# Patient Record
Sex: MALE | Race: WHITE | NOT HISPANIC OR LATINO | URBAN - METROPOLITAN AREA
[De-identification: names, ages, dates, MRNs, and addresses within clinical notes are randomized per-mention and may not be internally consistent; named-entity substitution may affect disease eponyms.]

---

## 2017-05-25 ENCOUNTER — OUTPATIENT (OUTPATIENT)
Dept: OUTPATIENT SERVICES | Facility: HOSPITAL | Age: 72
LOS: 1 days | Discharge: HOME | End: 2017-05-25

## 2017-06-22 ENCOUNTER — OUTPATIENT (OUTPATIENT)
Dept: OUTPATIENT SERVICES | Facility: HOSPITAL | Age: 72
LOS: 1 days | Discharge: HOME | End: 2017-06-22

## 2017-06-22 DIAGNOSIS — I10 ESSENTIAL (PRIMARY) HYPERTENSION: ICD-10-CM

## 2017-06-22 DIAGNOSIS — M86.9 OSTEOMYELITIS, UNSPECIFIED: ICD-10-CM

## 2017-06-22 DIAGNOSIS — I48.91 UNSPECIFIED ATRIAL FIBRILLATION: ICD-10-CM

## 2017-06-22 DIAGNOSIS — N40.0 BENIGN PROSTATIC HYPERPLASIA WITHOUT LOWER URINARY TRACT SYMPTOMS: ICD-10-CM

## 2017-06-28 DIAGNOSIS — I48.91 UNSPECIFIED ATRIAL FIBRILLATION: ICD-10-CM

## 2017-06-28 DIAGNOSIS — Z79.01 LONG TERM (CURRENT) USE OF ANTICOAGULANTS: ICD-10-CM

## 2017-07-20 ENCOUNTER — OUTPATIENT (OUTPATIENT)
Dept: OUTPATIENT SERVICES | Facility: HOSPITAL | Age: 72
LOS: 1 days | Discharge: HOME | End: 2017-07-20

## 2017-07-20 DIAGNOSIS — Z79.01 LONG TERM (CURRENT) USE OF ANTICOAGULANTS: ICD-10-CM

## 2017-07-20 DIAGNOSIS — I48.91 UNSPECIFIED ATRIAL FIBRILLATION: ICD-10-CM

## 2017-07-20 DIAGNOSIS — N40.0 BENIGN PROSTATIC HYPERPLASIA WITHOUT LOWER URINARY TRACT SYMPTOMS: ICD-10-CM

## 2017-07-20 DIAGNOSIS — I10 ESSENTIAL (PRIMARY) HYPERTENSION: ICD-10-CM

## 2017-07-20 DIAGNOSIS — M86.9 OSTEOMYELITIS, UNSPECIFIED: ICD-10-CM

## 2017-08-24 ENCOUNTER — OUTPATIENT (OUTPATIENT)
Dept: OUTPATIENT SERVICES | Facility: HOSPITAL | Age: 72
LOS: 1 days | Discharge: HOME | End: 2017-08-24

## 2017-08-24 DIAGNOSIS — Z79.01 LONG TERM (CURRENT) USE OF ANTICOAGULANTS: ICD-10-CM

## 2017-08-24 DIAGNOSIS — N40.0 BENIGN PROSTATIC HYPERPLASIA WITHOUT LOWER URINARY TRACT SYMPTOMS: ICD-10-CM

## 2017-08-24 DIAGNOSIS — I10 ESSENTIAL (PRIMARY) HYPERTENSION: ICD-10-CM

## 2017-08-24 DIAGNOSIS — M86.9 OSTEOMYELITIS, UNSPECIFIED: ICD-10-CM

## 2017-08-24 DIAGNOSIS — I48.91 UNSPECIFIED ATRIAL FIBRILLATION: ICD-10-CM

## 2017-09-28 ENCOUNTER — OUTPATIENT (OUTPATIENT)
Dept: OUTPATIENT SERVICES | Facility: HOSPITAL | Age: 72
LOS: 1 days | Discharge: HOME | End: 2017-09-28

## 2017-09-28 DIAGNOSIS — N40.0 BENIGN PROSTATIC HYPERPLASIA WITHOUT LOWER URINARY TRACT SYMPTOMS: ICD-10-CM

## 2017-09-28 DIAGNOSIS — I48.91 UNSPECIFIED ATRIAL FIBRILLATION: ICD-10-CM

## 2017-09-28 DIAGNOSIS — I10 ESSENTIAL (PRIMARY) HYPERTENSION: ICD-10-CM

## 2017-09-28 DIAGNOSIS — Z79.01 LONG TERM (CURRENT) USE OF ANTICOAGULANTS: ICD-10-CM

## 2017-09-28 DIAGNOSIS — M86.9 OSTEOMYELITIS, UNSPECIFIED: ICD-10-CM

## 2017-11-02 ENCOUNTER — OUTPATIENT (OUTPATIENT)
Dept: OUTPATIENT SERVICES | Facility: HOSPITAL | Age: 72
LOS: 1 days | Discharge: HOME | End: 2017-11-02

## 2017-11-02 DIAGNOSIS — I48.91 UNSPECIFIED ATRIAL FIBRILLATION: ICD-10-CM

## 2017-11-02 DIAGNOSIS — M86.9 OSTEOMYELITIS, UNSPECIFIED: ICD-10-CM

## 2017-11-02 DIAGNOSIS — Z79.01 LONG TERM (CURRENT) USE OF ANTICOAGULANTS: ICD-10-CM

## 2017-11-02 DIAGNOSIS — I10 ESSENTIAL (PRIMARY) HYPERTENSION: ICD-10-CM

## 2017-11-02 DIAGNOSIS — N40.0 BENIGN PROSTATIC HYPERPLASIA WITHOUT LOWER URINARY TRACT SYMPTOMS: ICD-10-CM

## 2017-12-06 ENCOUNTER — OUTPATIENT (OUTPATIENT)
Dept: OUTPATIENT SERVICES | Facility: HOSPITAL | Age: 72
LOS: 1 days | Discharge: HOME | End: 2017-12-06

## 2017-12-06 DIAGNOSIS — Z79.01 LONG TERM (CURRENT) USE OF ANTICOAGULANTS: ICD-10-CM

## 2017-12-06 DIAGNOSIS — I48.91 UNSPECIFIED ATRIAL FIBRILLATION: ICD-10-CM

## 2017-12-06 DIAGNOSIS — N40.0 BENIGN PROSTATIC HYPERPLASIA WITHOUT LOWER URINARY TRACT SYMPTOMS: ICD-10-CM

## 2017-12-06 DIAGNOSIS — I10 ESSENTIAL (PRIMARY) HYPERTENSION: ICD-10-CM

## 2017-12-06 DIAGNOSIS — M86.9 OSTEOMYELITIS, UNSPECIFIED: ICD-10-CM

## 2018-01-11 ENCOUNTER — OUTPATIENT (OUTPATIENT)
Dept: OUTPATIENT SERVICES | Facility: HOSPITAL | Age: 73
LOS: 1 days | Discharge: HOME | End: 2018-01-11

## 2018-01-11 DIAGNOSIS — I48.91 UNSPECIFIED ATRIAL FIBRILLATION: ICD-10-CM

## 2018-01-11 DIAGNOSIS — I10 ESSENTIAL (PRIMARY) HYPERTENSION: ICD-10-CM

## 2018-01-11 DIAGNOSIS — N40.0 BENIGN PROSTATIC HYPERPLASIA WITHOUT LOWER URINARY TRACT SYMPTOMS: ICD-10-CM

## 2018-01-11 DIAGNOSIS — Z79.01 LONG TERM (CURRENT) USE OF ANTICOAGULANTS: ICD-10-CM

## 2018-01-11 DIAGNOSIS — M86.9 OSTEOMYELITIS, UNSPECIFIED: ICD-10-CM

## 2018-02-15 ENCOUNTER — OUTPATIENT (OUTPATIENT)
Dept: OUTPATIENT SERVICES | Facility: HOSPITAL | Age: 73
LOS: 1 days | Discharge: HOME | End: 2018-02-15

## 2018-02-15 DIAGNOSIS — I48.91 UNSPECIFIED ATRIAL FIBRILLATION: ICD-10-CM

## 2018-02-15 DIAGNOSIS — Z79.01 LONG TERM (CURRENT) USE OF ANTICOAGULANTS: ICD-10-CM

## 2018-03-22 ENCOUNTER — OUTPATIENT (OUTPATIENT)
Dept: OUTPATIENT SERVICES | Facility: HOSPITAL | Age: 73
LOS: 1 days | Discharge: HOME | End: 2018-03-22

## 2018-03-22 DIAGNOSIS — Z79.01 LONG TERM (CURRENT) USE OF ANTICOAGULANTS: ICD-10-CM

## 2018-03-22 DIAGNOSIS — I48.91 UNSPECIFIED ATRIAL FIBRILLATION: ICD-10-CM

## 2018-04-26 ENCOUNTER — OUTPATIENT (OUTPATIENT)
Dept: OUTPATIENT SERVICES | Facility: HOSPITAL | Age: 73
LOS: 1 days | Discharge: HOME | End: 2018-04-26

## 2018-04-26 DIAGNOSIS — Z79.01 LONG TERM (CURRENT) USE OF ANTICOAGULANTS: ICD-10-CM

## 2018-04-26 DIAGNOSIS — I48.91 UNSPECIFIED ATRIAL FIBRILLATION: ICD-10-CM

## 2018-05-10 ENCOUNTER — OUTPATIENT (OUTPATIENT)
Dept: OUTPATIENT SERVICES | Facility: HOSPITAL | Age: 73
LOS: 1 days | Discharge: HOME | End: 2018-05-10

## 2018-05-10 DIAGNOSIS — Z79.01 LONG TERM (CURRENT) USE OF ANTICOAGULANTS: ICD-10-CM

## 2018-05-10 DIAGNOSIS — I48.91 UNSPECIFIED ATRIAL FIBRILLATION: ICD-10-CM

## 2018-06-14 ENCOUNTER — OUTPATIENT (OUTPATIENT)
Dept: OUTPATIENT SERVICES | Facility: HOSPITAL | Age: 73
LOS: 1 days | Discharge: HOME | End: 2018-06-14

## 2018-06-14 DIAGNOSIS — Z79.01 LONG TERM (CURRENT) USE OF ANTICOAGULANTS: ICD-10-CM

## 2018-06-14 DIAGNOSIS — I48.91 UNSPECIFIED ATRIAL FIBRILLATION: ICD-10-CM

## 2018-07-12 ENCOUNTER — OUTPATIENT (OUTPATIENT)
Dept: OUTPATIENT SERVICES | Facility: HOSPITAL | Age: 73
LOS: 1 days | Discharge: HOME | End: 2018-07-12

## 2018-07-12 DIAGNOSIS — I48.91 UNSPECIFIED ATRIAL FIBRILLATION: ICD-10-CM

## 2018-07-12 DIAGNOSIS — Z79.01 LONG TERM (CURRENT) USE OF ANTICOAGULANTS: ICD-10-CM

## 2018-08-09 ENCOUNTER — OUTPATIENT (OUTPATIENT)
Dept: OUTPATIENT SERVICES | Facility: HOSPITAL | Age: 73
LOS: 1 days | Discharge: HOME | End: 2018-08-09

## 2018-08-09 DIAGNOSIS — Z79.01 LONG TERM (CURRENT) USE OF ANTICOAGULANTS: ICD-10-CM

## 2018-08-09 DIAGNOSIS — I48.91 UNSPECIFIED ATRIAL FIBRILLATION: ICD-10-CM

## 2018-09-13 ENCOUNTER — OUTPATIENT (OUTPATIENT)
Dept: OUTPATIENT SERVICES | Facility: HOSPITAL | Age: 73
LOS: 1 days | Discharge: HOME | End: 2018-09-13

## 2018-09-13 DIAGNOSIS — I48.91 UNSPECIFIED ATRIAL FIBRILLATION: ICD-10-CM

## 2018-09-13 DIAGNOSIS — Z79.01 LONG TERM (CURRENT) USE OF ANTICOAGULANTS: ICD-10-CM

## 2018-09-13 LAB
POCT INR: 2 RATIO — HIGH (ref 0.9–1.2)
POCT PT: 23.7 SEC — HIGH (ref 10–13.4)

## 2018-10-18 ENCOUNTER — OUTPATIENT (OUTPATIENT)
Dept: OUTPATIENT SERVICES | Facility: HOSPITAL | Age: 73
LOS: 1 days | Discharge: HOME | End: 2018-10-18

## 2018-10-18 DIAGNOSIS — I48.91 UNSPECIFIED ATRIAL FIBRILLATION: ICD-10-CM

## 2018-10-18 DIAGNOSIS — Z79.01 LONG TERM (CURRENT) USE OF ANTICOAGULANTS: ICD-10-CM

## 2018-10-18 LAB
POCT INR: 2.3 RATIO — HIGH (ref 0.9–1.2)
POCT PT: 27 SEC — HIGH (ref 10–13.4)

## 2018-11-29 ENCOUNTER — OUTPATIENT (OUTPATIENT)
Dept: OUTPATIENT SERVICES | Facility: HOSPITAL | Age: 73
LOS: 1 days | Discharge: HOME | End: 2018-11-29

## 2018-11-29 DIAGNOSIS — Z79.01 LONG TERM (CURRENT) USE OF ANTICOAGULANTS: ICD-10-CM

## 2018-11-29 DIAGNOSIS — I48.91 UNSPECIFIED ATRIAL FIBRILLATION: ICD-10-CM

## 2018-11-29 LAB
POCT INR: 2.5 RATIO — HIGH (ref 0.9–1.2)
POCT PT: 30 SEC — HIGH (ref 10–13.4)

## 2019-01-03 ENCOUNTER — OUTPATIENT (OUTPATIENT)
Dept: OUTPATIENT SERVICES | Facility: HOSPITAL | Age: 74
LOS: 1 days | Discharge: HOME | End: 2019-01-03

## 2019-01-03 DIAGNOSIS — Z79.01 LONG TERM (CURRENT) USE OF ANTICOAGULANTS: ICD-10-CM

## 2019-01-03 DIAGNOSIS — I48.91 UNSPECIFIED ATRIAL FIBRILLATION: ICD-10-CM

## 2019-01-03 LAB
POCT INR: 2.7 RATIO — HIGH (ref 0.9–1.2)
POCT PT: 32.2 SEC — HIGH (ref 10–13.4)

## 2019-02-07 ENCOUNTER — OUTPATIENT (OUTPATIENT)
Dept: OUTPATIENT SERVICES | Facility: HOSPITAL | Age: 74
LOS: 1 days | Discharge: HOME | End: 2019-02-07

## 2019-02-07 DIAGNOSIS — I48.91 UNSPECIFIED ATRIAL FIBRILLATION: ICD-10-CM

## 2019-02-07 DIAGNOSIS — Z79.01 LONG TERM (CURRENT) USE OF ANTICOAGULANTS: ICD-10-CM

## 2019-02-07 LAB
POCT INR: 2.5 RATIO — HIGH (ref 0.9–1.2)
POCT PT: 30.5 SEC — HIGH (ref 10–13.4)

## 2019-03-14 ENCOUNTER — OUTPATIENT (OUTPATIENT)
Dept: OUTPATIENT SERVICES | Facility: HOSPITAL | Age: 74
LOS: 1 days | Discharge: HOME | End: 2019-03-14

## 2019-03-14 DIAGNOSIS — Z79.01 LONG TERM (CURRENT) USE OF ANTICOAGULANTS: ICD-10-CM

## 2019-03-14 DIAGNOSIS — I48.91 UNSPECIFIED ATRIAL FIBRILLATION: ICD-10-CM

## 2019-03-14 DIAGNOSIS — Z95.2 PRESENCE OF PROSTHETIC HEART VALVE: ICD-10-CM

## 2019-03-14 LAB
POCT INR: 2.8 RATIO — HIGH (ref 0.9–1.2)
POCT PT: 33.9 SEC — HIGH (ref 10–13.4)

## 2019-04-18 ENCOUNTER — OUTPATIENT (OUTPATIENT)
Dept: OUTPATIENT SERVICES | Facility: HOSPITAL | Age: 74
LOS: 1 days | Discharge: HOME | End: 2019-04-18

## 2019-04-18 DIAGNOSIS — Z79.01 LONG TERM (CURRENT) USE OF ANTICOAGULANTS: ICD-10-CM

## 2019-04-18 DIAGNOSIS — Z95.2 PRESENCE OF PROSTHETIC HEART VALVE: ICD-10-CM

## 2019-04-18 LAB
POCT INR: 2.9 RATIO — HIGH (ref 0.9–1.2)
POCT PT: 35.2 SEC — HIGH (ref 10–13.4)

## 2019-05-23 ENCOUNTER — OUTPATIENT (OUTPATIENT)
Dept: OUTPATIENT SERVICES | Facility: HOSPITAL | Age: 74
LOS: 1 days | Discharge: HOME | End: 2019-05-23

## 2019-05-23 DIAGNOSIS — Z79.01 LONG TERM (CURRENT) USE OF ANTICOAGULANTS: ICD-10-CM

## 2019-05-23 DIAGNOSIS — Z95.2 PRESENCE OF PROSTHETIC HEART VALVE: ICD-10-CM

## 2019-06-27 ENCOUNTER — OUTPATIENT (OUTPATIENT)
Dept: OUTPATIENT SERVICES | Facility: HOSPITAL | Age: 74
LOS: 1 days | Discharge: HOME | End: 2019-06-27

## 2019-06-27 DIAGNOSIS — Z79.01 LONG TERM (CURRENT) USE OF ANTICOAGULANTS: ICD-10-CM

## 2019-06-27 DIAGNOSIS — Z95.2 PRESENCE OF PROSTHETIC HEART VALVE: ICD-10-CM

## 2019-06-27 LAB
POCT INR: 2.7 RATIO — HIGH (ref 0.9–1.2)
POCT PT: 32.1 SEC — HIGH (ref 10–13.4)

## 2019-08-01 ENCOUNTER — OUTPATIENT (OUTPATIENT)
Dept: OUTPATIENT SERVICES | Facility: HOSPITAL | Age: 74
LOS: 1 days | Discharge: HOME | End: 2019-08-01

## 2019-08-01 DIAGNOSIS — Z95.2 PRESENCE OF PROSTHETIC HEART VALVE: ICD-10-CM

## 2019-08-01 DIAGNOSIS — Z79.01 LONG TERM (CURRENT) USE OF ANTICOAGULANTS: ICD-10-CM

## 2019-09-05 ENCOUNTER — OUTPATIENT (OUTPATIENT)
Dept: OUTPATIENT SERVICES | Facility: HOSPITAL | Age: 74
LOS: 1 days | Discharge: HOME | End: 2019-09-05

## 2019-09-05 DIAGNOSIS — Z95.2 PRESENCE OF PROSTHETIC HEART VALVE: ICD-10-CM

## 2019-09-05 DIAGNOSIS — Z79.01 LONG TERM (CURRENT) USE OF ANTICOAGULANTS: ICD-10-CM

## 2019-09-05 LAB
POCT INR: 2.3 RATIO — HIGH (ref 0.9–1.2)
POCT PT: 27.7 SEC — HIGH (ref 10–13.4)

## 2019-10-10 ENCOUNTER — OUTPATIENT (OUTPATIENT)
Dept: OUTPATIENT SERVICES | Facility: HOSPITAL | Age: 74
LOS: 1 days | Discharge: HOME | End: 2019-10-10

## 2019-10-10 DIAGNOSIS — Z79.01 LONG TERM (CURRENT) USE OF ANTICOAGULANTS: ICD-10-CM

## 2019-10-10 DIAGNOSIS — Z95.2 PRESENCE OF PROSTHETIC HEART VALVE: ICD-10-CM

## 2019-10-10 LAB
POCT INR: 3 RATIO — HIGH (ref 0.9–1.2)
POCT PT: 36.2 SEC — HIGH (ref 10–13.4)

## 2019-11-14 ENCOUNTER — OUTPATIENT (OUTPATIENT)
Dept: OUTPATIENT SERVICES | Facility: HOSPITAL | Age: 74
LOS: 1 days | Discharge: HOME | End: 2019-11-14

## 2019-11-14 DIAGNOSIS — Z95.2 PRESENCE OF PROSTHETIC HEART VALVE: ICD-10-CM

## 2019-11-14 DIAGNOSIS — Z79.01 LONG TERM (CURRENT) USE OF ANTICOAGULANTS: ICD-10-CM

## 2019-11-14 LAB
POCT INR: 2.5 RATIO — HIGH (ref 0.9–1.2)
POCT PT: 30.4 SEC — HIGH (ref 10–13.4)

## 2019-12-19 ENCOUNTER — OUTPATIENT (OUTPATIENT)
Dept: OUTPATIENT SERVICES | Facility: HOSPITAL | Age: 74
LOS: 1 days | Discharge: HOME | End: 2019-12-19

## 2019-12-19 DIAGNOSIS — Z95.2 PRESENCE OF PROSTHETIC HEART VALVE: ICD-10-CM

## 2019-12-19 DIAGNOSIS — Z79.01 LONG TERM (CURRENT) USE OF ANTICOAGULANTS: ICD-10-CM

## 2019-12-19 LAB
POCT INR: 2.4 RATIO — HIGH (ref 0.9–1.2)
POCT PT: 28.3 SEC — HIGH (ref 10–13.4)

## 2020-01-01 ENCOUNTER — OUTPATIENT (OUTPATIENT)
Dept: OUTPATIENT SERVICES | Facility: HOSPITAL | Age: 75
LOS: 1 days | Discharge: HOME | End: 2020-01-01

## 2020-01-01 ENCOUNTER — APPOINTMENT (OUTPATIENT)
Dept: MEDICATION MANAGEMENT | Facility: CLINIC | Age: 75
End: 2020-01-01

## 2020-01-01 ENCOUNTER — RECORD ABSTRACTING (OUTPATIENT)
Age: 75
End: 2020-01-01

## 2020-01-01 VITALS — HEART RATE: 76 BPM | RESPIRATION RATE: 16 BRPM | OXYGEN SATURATION: 98 %

## 2020-01-01 VITALS — RESPIRATION RATE: 16 BRPM | HEART RATE: 74 BPM | OXYGEN SATURATION: 98 %

## 2020-01-01 VITALS — HEART RATE: 97 BPM | RESPIRATION RATE: 18 BRPM | OXYGEN SATURATION: 98 %

## 2020-01-01 VITALS — RESPIRATION RATE: 18 BRPM | OXYGEN SATURATION: 98 % | HEART RATE: 97 BPM

## 2020-01-01 DIAGNOSIS — I50.9 HEART FAILURE, UNSPECIFIED: ICD-10-CM

## 2020-01-01 DIAGNOSIS — I48.91 UNSPECIFIED ATRIAL FIBRILLATION: ICD-10-CM

## 2020-01-01 DIAGNOSIS — Z79.01 LONG TERM (CURRENT) USE OF ANTICOAGULANTS: ICD-10-CM

## 2020-01-01 DIAGNOSIS — Z86.79 PERSONAL HISTORY OF OTHER DISEASES OF THE CIRCULATORY SYSTEM: ICD-10-CM

## 2020-01-01 DIAGNOSIS — Z78.9 OTHER SPECIFIED HEALTH STATUS: ICD-10-CM

## 2020-01-01 DIAGNOSIS — E11.65 TYPE 2 DIABETES MELLITUS WITH HYPERGLYCEMIA: ICD-10-CM

## 2020-01-01 DIAGNOSIS — Z51.81 ENCOUNTER FOR THERAPEUTIC DRUG LVL MONITORING: ICD-10-CM

## 2020-01-01 DIAGNOSIS — E78.00 PURE HYPERCHOLESTEROLEMIA, UNSPECIFIED: ICD-10-CM

## 2020-01-01 DIAGNOSIS — Z98.1 ARTHRODESIS STATUS: ICD-10-CM

## 2020-01-01 DIAGNOSIS — I10 ESSENTIAL (PRIMARY) HYPERTENSION: ICD-10-CM

## 2020-01-01 LAB
INR PPP: 2.5 RATIO
INR PPP: 2.8 RATIO
INR PPP: 3 RATIO
INR PPP: 3.3 RATIO
POCT INR: 2.3 RATIO — HIGH (ref 0.9–1.2)
POCT INR: 2.6 RATIO — HIGH (ref 0.9–1.2)
POCT INR: 2.7 RATIO — HIGH (ref 0.9–1.2)
POCT INR: 3.5 RATIO — HIGH (ref 0.9–1.2)
POCT PT: 27.9 SEC — HIGH (ref 10–13.4)
POCT PT: 31.7 SEC — HIGH (ref 10–13.4)
POCT PT: 32.6 SEC — HIGH (ref 10–13.4)
POCT PT: 41.7 SEC — HIGH (ref 10–13.4)
POCT-PROTHROMBIN TIME: 29.5 SECS
POCT-PROTHROMBIN TIME: 33.8 SECS
POCT-PROTHROMBIN TIME: 36.5 SECS
POCT-PROTHROMBIN TIME: 39.2 SECS
QUALITY CONTROL: YES

## 2020-01-01 RX ORDER — FUROSEMIDE 20 MG/1
20 TABLET ORAL DAILY
Refills: 0 | Status: ACTIVE | COMMUNITY

## 2020-01-01 RX ORDER — OMEPRAZOLE 20 MG/1
20 TABLET, DELAYED RELEASE ORAL
Refills: 0 | Status: ACTIVE | COMMUNITY

## 2020-01-01 RX ORDER — LOSARTAN POTASSIUM 50 MG/1
50 TABLET, FILM COATED ORAL
Refills: 0 | Status: ACTIVE | COMMUNITY

## 2020-01-01 RX ORDER — PNV NO.95/FERROUS FUM/FOLIC AC 28MG-0.8MG
TABLET ORAL
Refills: 0 | Status: ACTIVE | COMMUNITY

## 2020-01-01 RX ORDER — CALCIUM CARBONATE/VITAMIN D3 600 MG-10
TABLET ORAL
Refills: 0 | Status: ACTIVE | COMMUNITY

## 2020-01-01 RX ORDER — DILTIAZEM HYDROCHLORIDE 240 MG/1
240 CAPSULE, EXTENDED RELEASE ORAL
Refills: 0 | Status: ACTIVE | COMMUNITY

## 2020-01-01 RX ORDER — MULTIVITAMIN
TABLET ORAL
Refills: 0 | Status: ACTIVE | COMMUNITY

## 2020-01-01 RX ORDER — ATENOLOL 50 MG/1
50 TABLET ORAL DAILY
Refills: 0 | Status: ACTIVE | COMMUNITY

## 2020-01-01 RX ORDER — TAMSULOSIN HYDROCHLORIDE 0.4 MG/1
0.4 CAPSULE ORAL
Refills: 0 | Status: ACTIVE | COMMUNITY

## 2020-01-01 RX ORDER — METFORMIN HYDROCHLORIDE 500 MG/1
500 TABLET, COATED ORAL
Refills: 0 | Status: ACTIVE | COMMUNITY

## 2020-01-01 RX ORDER — WARFARIN SODIUM 3 MG/1
3 TABLET ORAL
Refills: 0 | Status: ACTIVE | COMMUNITY

## 2020-01-01 RX ORDER — WARFARIN 3 MG/1
3 TABLET ORAL
Refills: 0 | Status: ACTIVE | COMMUNITY

## 2020-01-01 RX ORDER — DIGOXIN 0.06 MG/1
TABLET ORAL
Refills: 0 | Status: ACTIVE | COMMUNITY

## 2020-01-01 RX ORDER — SIMVASTATIN 20 MG/1
20 TABLET, FILM COATED ORAL DAILY
Refills: 0 | Status: ACTIVE | COMMUNITY

## 2020-01-23 ENCOUNTER — OUTPATIENT (OUTPATIENT)
Dept: OUTPATIENT SERVICES | Facility: HOSPITAL | Age: 75
LOS: 1 days | Discharge: HOME | End: 2020-01-23

## 2020-01-23 DIAGNOSIS — I48.91 UNSPECIFIED ATRIAL FIBRILLATION: ICD-10-CM

## 2020-01-23 DIAGNOSIS — Z79.01 LONG TERM (CURRENT) USE OF ANTICOAGULANTS: ICD-10-CM

## 2020-01-23 LAB
POCT INR: 2.9 RATIO — HIGH (ref 0.9–1.2)
POCT PT: 35.4 SEC — HIGH (ref 10–13.4)

## 2020-02-27 ENCOUNTER — OUTPATIENT (OUTPATIENT)
Dept: OUTPATIENT SERVICES | Facility: HOSPITAL | Age: 75
LOS: 1 days | Discharge: HOME | End: 2020-02-27

## 2020-02-27 DIAGNOSIS — I48.91 UNSPECIFIED ATRIAL FIBRILLATION: ICD-10-CM

## 2020-02-27 DIAGNOSIS — Z79.01 LONG TERM (CURRENT) USE OF ANTICOAGULANTS: ICD-10-CM

## 2020-02-27 LAB
POCT INR: 2.9 RATIO — HIGH (ref 0.9–1.2)
POCT PT: 34.5 SEC — HIGH (ref 10–13.4)

## 2020-08-07 PROBLEM — E78.00 HYPERCHOLESTEROLEMIA: Status: ACTIVE | Noted: 2020-01-01

## 2020-08-07 PROBLEM — I50.9 CHF (CONGESTIVE HEART FAILURE): Status: ACTIVE | Noted: 2020-01-01

## 2020-08-07 PROBLEM — Z78.9 NON-SMOKER: Status: ACTIVE | Noted: 2020-01-01

## 2020-08-07 PROBLEM — Z00.00 ENCOUNTER FOR PREVENTIVE HEALTH EXAMINATION: Status: ACTIVE | Noted: 2020-01-01

## 2020-08-07 PROBLEM — E11.65 TYPE 2 DIABETES MELLITUS WITH HYPERGLYCEMIA: Status: ACTIVE | Noted: 2020-01-01

## 2020-08-13 PROBLEM — I48.91 UNSPECIFIED ATRIAL FIBRILLATION: Status: ACTIVE | Noted: 2020-01-01

## 2020-08-13 PROBLEM — Z51.81 ENCOUNTER FOR THERAPEUTIC DRUG MONITORING: Status: ACTIVE | Noted: 2020-01-01

## 2020-09-18 PROBLEM — Z98.1 H/O SPINAL FUSION: Status: RESOLVED | Noted: 2020-01-01 | Resolved: 2020-01-01

## 2020-09-18 PROBLEM — I10 HYPERTENSION, BENIGN: Status: RESOLVED | Noted: 2020-01-01 | Resolved: 2020-01-01

## 2020-09-29 PROBLEM — Z86.79 HISTORY OF ATRIAL FIBRILLATION: Status: RESOLVED | Noted: 2020-01-01 | Resolved: 2020-01-01

## 2020-11-05 PROBLEM — Z79.01 LONG TERM (CURRENT) USE OF ANTICOAGULANTS: Status: ACTIVE | Noted: 2020-01-01

## 2021-01-01 ENCOUNTER — INPATIENT (INPATIENT)
Facility: HOSPITAL | Age: 76
LOS: 19 days | End: 2021-03-04
Attending: INTERNAL MEDICINE | Admitting: INTERNAL MEDICINE
Payer: MEDICARE

## 2021-01-01 ENCOUNTER — INPATIENT (INPATIENT)
Facility: HOSPITAL | Age: 76
LOS: 16 days | End: 2021-03-04
Attending: INTERNAL MEDICINE | Admitting: INTERNAL MEDICINE
Payer: MEDICARE

## 2021-01-01 ENCOUNTER — APPOINTMENT (OUTPATIENT)
Dept: MEDICATION MANAGEMENT | Facility: CLINIC | Age: 76
End: 2021-01-01

## 2021-01-01 ENCOUNTER — OUTPATIENT (OUTPATIENT)
Dept: OUTPATIENT SERVICES | Facility: HOSPITAL | Age: 76
LOS: 1 days | Discharge: HOME | End: 2021-01-01

## 2021-01-01 VITALS — WEIGHT: 220.02 LBS | HEIGHT: 70 IN

## 2021-01-01 VITALS
SYSTOLIC BLOOD PRESSURE: 95 MMHG | DIASTOLIC BLOOD PRESSURE: 56 MMHG | RESPIRATION RATE: 24 BRPM | HEART RATE: 102 BPM | TEMPERATURE: 98 F | OXYGEN SATURATION: 95 %

## 2021-01-01 VITALS
SYSTOLIC BLOOD PRESSURE: 113 MMHG | HEART RATE: 92 BPM | DIASTOLIC BLOOD PRESSURE: 69 MMHG | OXYGEN SATURATION: 88 % | RESPIRATION RATE: 22 BRPM

## 2021-01-01 VITALS — HEART RATE: 69 BPM | RESPIRATION RATE: 21 BRPM

## 2021-01-01 DIAGNOSIS — E87.8 OTHER DISORDERS OF ELECTROLYTE AND FLUID BALANCE, NOT ELSEWHERE CLASSIFIED: ICD-10-CM

## 2021-01-01 DIAGNOSIS — U07.1 COVID-19: ICD-10-CM

## 2021-01-01 DIAGNOSIS — I82.462 ACUTE EMBOLISM AND THROMBOSIS OF LEFT CALF MUSCULAR VEIN: ICD-10-CM

## 2021-01-01 DIAGNOSIS — Z79.01 LONG TERM (CURRENT) USE OF ANTICOAGULANTS: ICD-10-CM

## 2021-01-01 DIAGNOSIS — I26.99 OTHER PULMONARY EMBOLISM WITHOUT ACUTE COR PULMONALE: ICD-10-CM

## 2021-01-01 DIAGNOSIS — J12.82 PNEUMONIA DUE TO CORONAVIRUS DISEASE 2019: ICD-10-CM

## 2021-01-01 DIAGNOSIS — I10 ESSENTIAL (PRIMARY) HYPERTENSION: ICD-10-CM

## 2021-01-01 DIAGNOSIS — E78.2 MIXED HYPERLIPIDEMIA: ICD-10-CM

## 2021-01-01 DIAGNOSIS — R16.0 HEPATOMEGALY, NOT ELSEWHERE CLASSIFIED: ICD-10-CM

## 2021-01-01 DIAGNOSIS — J80 ACUTE RESPIRATORY DISTRESS SYNDROME: ICD-10-CM

## 2021-01-01 DIAGNOSIS — Z98.890 OTHER SPECIFIED POSTPROCEDURAL STATES: Chronic | ICD-10-CM

## 2021-01-01 DIAGNOSIS — E11.65 TYPE 2 DIABETES MELLITUS WITH HYPERGLYCEMIA: ICD-10-CM

## 2021-01-01 DIAGNOSIS — G47.33 OBSTRUCTIVE SLEEP APNEA (ADULT) (PEDIATRIC): ICD-10-CM

## 2021-01-01 DIAGNOSIS — Z02.9 ENCOUNTER FOR ADMINISTRATIVE EXAMINATIONS, UNSPECIFIED: ICD-10-CM

## 2021-01-01 DIAGNOSIS — E11.9 TYPE 2 DIABETES MELLITUS WITHOUT COMPLICATIONS: ICD-10-CM

## 2021-01-01 DIAGNOSIS — E87.1 HYPO-OSMOLALITY AND HYPONATREMIA: ICD-10-CM

## 2021-01-01 DIAGNOSIS — I82.412 ACUTE EMBOLISM AND THROMBOSIS OF LEFT FEMORAL VEIN: ICD-10-CM

## 2021-01-01 DIAGNOSIS — Z66 DO NOT RESUSCITATE: ICD-10-CM

## 2021-01-01 DIAGNOSIS — I82.432 ACUTE EMBOLISM AND THROMBOSIS OF LEFT POPLITEAL VEIN: ICD-10-CM

## 2021-01-01 DIAGNOSIS — N40.0 BENIGN PROSTATIC HYPERPLASIA WITHOUT LOWER URINARY TRACT SYMPTOMS: ICD-10-CM

## 2021-01-01 DIAGNOSIS — Z79.84 LONG TERM (CURRENT) USE OF ORAL HYPOGLYCEMIC DRUGS: ICD-10-CM

## 2021-01-01 DIAGNOSIS — K21.9 GASTRO-ESOPHAGEAL REFLUX DISEASE WITHOUT ESOPHAGITIS: ICD-10-CM

## 2021-01-01 DIAGNOSIS — N17.9 ACUTE KIDNEY FAILURE, UNSPECIFIED: ICD-10-CM

## 2021-01-01 DIAGNOSIS — I48.91 UNSPECIFIED ATRIAL FIBRILLATION: ICD-10-CM

## 2021-01-01 DIAGNOSIS — E87.5 HYPERKALEMIA: ICD-10-CM

## 2021-01-01 DIAGNOSIS — D65 DISSEMINATED INTRAVASCULAR COAGULATION [DEFIBRINATION SYNDROME]: ICD-10-CM

## 2021-01-01 DIAGNOSIS — T50.1X5A ADVERSE EFFECT OF LOOP [HIGH-CEILING] DIURETICS, INITIAL ENCOUNTER: ICD-10-CM

## 2021-01-01 DIAGNOSIS — Z91.19 PATIENT'S NONCOMPLIANCE WITH OTHER MEDICAL TREATMENT AND REGIMEN: ICD-10-CM

## 2021-01-01 DIAGNOSIS — I48.20 CHRONIC ATRIAL FIBRILLATION, UNSPECIFIED: ICD-10-CM

## 2021-01-01 DIAGNOSIS — I46.9 CARDIAC ARREST, CAUSE UNSPECIFIED: ICD-10-CM

## 2021-01-01 LAB
A1C WITH ESTIMATED AVERAGE GLUCOSE RESULT: 11 % — HIGH (ref 4–5.6)
ABO RH CONFIRMATION: SIGNIFICANT CHANGE UP
ALBUMIN SERPL ELPH-MCNC: 2.3 G/DL — LOW (ref 3.5–5.2)
ALBUMIN SERPL ELPH-MCNC: 2.6 G/DL — LOW (ref 3.5–5.2)
ALBUMIN SERPL ELPH-MCNC: 2.7 G/DL — LOW (ref 3.5–5.2)
ALBUMIN SERPL ELPH-MCNC: 2.8 G/DL — LOW (ref 3.5–5.2)
ALBUMIN SERPL ELPH-MCNC: 2.8 G/DL — LOW (ref 3.5–5.2)
ALBUMIN SERPL ELPH-MCNC: 2.9 G/DL — LOW (ref 3.5–5.2)
ALBUMIN SERPL ELPH-MCNC: 3 G/DL — LOW (ref 3.5–5.2)
ALBUMIN SERPL ELPH-MCNC: 3.4 G/DL — LOW (ref 3.5–5.2)
ALBUMIN SERPL ELPH-MCNC: 3.5 G/DL — SIGNIFICANT CHANGE UP (ref 3.5–5.2)
ALBUMIN SERPL ELPH-MCNC: 3.6 G/DL — SIGNIFICANT CHANGE UP (ref 3.5–5.2)
ALBUMIN SERPL ELPH-MCNC: 3.6 G/DL — SIGNIFICANT CHANGE UP (ref 3.5–5.2)
ALBUMIN SERPL ELPH-MCNC: 3.9 G/DL — SIGNIFICANT CHANGE UP (ref 3.5–5.2)
ALP SERPL-CCNC: 107 U/L — SIGNIFICANT CHANGE UP (ref 30–115)
ALP SERPL-CCNC: 110 U/L — SIGNIFICANT CHANGE UP (ref 30–115)
ALP SERPL-CCNC: 114 U/L — SIGNIFICANT CHANGE UP (ref 30–115)
ALP SERPL-CCNC: 116 U/L — HIGH (ref 30–115)
ALP SERPL-CCNC: 119 U/L — HIGH (ref 30–115)
ALP SERPL-CCNC: 123 U/L — HIGH (ref 30–115)
ALP SERPL-CCNC: 129 U/L — HIGH (ref 30–115)
ALP SERPL-CCNC: 134 U/L — HIGH (ref 30–115)
ALP SERPL-CCNC: 134 U/L — HIGH (ref 30–115)
ALP SERPL-CCNC: 137 U/L — HIGH (ref 30–115)
ALP SERPL-CCNC: 141 U/L — HIGH (ref 30–115)
ALP SERPL-CCNC: 141 U/L — HIGH (ref 30–115)
ALP SERPL-CCNC: 144 U/L — HIGH (ref 30–115)
ALP SERPL-CCNC: 42 U/L — SIGNIFICANT CHANGE UP (ref 30–115)
ALP SERPL-CCNC: 44 U/L — SIGNIFICANT CHANGE UP (ref 30–115)
ALP SERPL-CCNC: 49 U/L — SIGNIFICANT CHANGE UP (ref 30–115)
ALP SERPL-CCNC: 53 U/L — SIGNIFICANT CHANGE UP (ref 30–115)
ALP SERPL-CCNC: 54 U/L — SIGNIFICANT CHANGE UP (ref 30–115)
ALP SERPL-CCNC: 85 U/L — SIGNIFICANT CHANGE UP (ref 30–115)
ALP SERPL-CCNC: 94 U/L — SIGNIFICANT CHANGE UP (ref 30–115)
ALP SERPL-CCNC: 94 U/L — SIGNIFICANT CHANGE UP (ref 30–115)
ALT FLD-CCNC: 18 U/L — SIGNIFICANT CHANGE UP (ref 0–41)
ALT FLD-CCNC: 18 U/L — SIGNIFICANT CHANGE UP (ref 0–41)
ALT FLD-CCNC: 19 U/L — SIGNIFICANT CHANGE UP (ref 0–41)
ALT FLD-CCNC: 21 U/L — SIGNIFICANT CHANGE UP (ref 0–41)
ALT FLD-CCNC: 22 U/L — SIGNIFICANT CHANGE UP (ref 0–41)
ALT FLD-CCNC: 22 U/L — SIGNIFICANT CHANGE UP (ref 0–41)
ALT FLD-CCNC: 24 U/L — SIGNIFICANT CHANGE UP (ref 0–41)
ALT FLD-CCNC: 24 U/L — SIGNIFICANT CHANGE UP (ref 0–41)
ALT FLD-CCNC: 25 U/L — SIGNIFICANT CHANGE UP (ref 0–41)
ALT FLD-CCNC: 26 U/L — SIGNIFICANT CHANGE UP (ref 0–41)
ALT FLD-CCNC: 28 U/L — SIGNIFICANT CHANGE UP (ref 0–41)
ALT FLD-CCNC: 29 U/L — SIGNIFICANT CHANGE UP (ref 0–41)
ALT FLD-CCNC: 40 U/L — SIGNIFICANT CHANGE UP (ref 0–41)
ALT FLD-CCNC: 41 U/L — SIGNIFICANT CHANGE UP (ref 0–41)
ALT FLD-CCNC: 42 U/L — HIGH (ref 0–41)
ALT FLD-CCNC: 43 U/L — HIGH (ref 0–41)
ALT FLD-CCNC: 43 U/L — HIGH (ref 0–41)
ALT FLD-CCNC: 44 U/L — HIGH (ref 0–41)
ALT FLD-CCNC: 45 U/L — HIGH (ref 0–41)
ANION GAP SERPL CALC-SCNC: 12 MMOL/L — SIGNIFICANT CHANGE UP (ref 7–14)
ANION GAP SERPL CALC-SCNC: 13 MMOL/L — SIGNIFICANT CHANGE UP (ref 7–14)
ANION GAP SERPL CALC-SCNC: 14 MMOL/L — SIGNIFICANT CHANGE UP (ref 7–14)
ANION GAP SERPL CALC-SCNC: 15 MMOL/L — HIGH (ref 7–14)
ANION GAP SERPL CALC-SCNC: 16 MMOL/L — HIGH (ref 7–14)
ANION GAP SERPL CALC-SCNC: 16 MMOL/L — HIGH (ref 7–14)
ANION GAP SERPL CALC-SCNC: 17 MMOL/L — HIGH (ref 7–14)
ANION GAP SERPL CALC-SCNC: 19 MMOL/L — HIGH (ref 7–14)
ANION GAP SERPL CALC-SCNC: 20 MMOL/L — HIGH (ref 7–14)
ANION GAP SERPL CALC-SCNC: 20 MMOL/L — HIGH (ref 7–14)
ANION GAP SERPL CALC-SCNC: 21 MMOL/L — HIGH (ref 7–14)
APTT BLD: 101.6 SEC — CRITICAL HIGH (ref 27–39.2)
APTT BLD: 33.1 SEC — SIGNIFICANT CHANGE UP (ref 27–39.2)
APTT BLD: 37.2 SEC — SIGNIFICANT CHANGE UP (ref 27–39.2)
APTT BLD: 42.6 SEC — HIGH (ref 27–39.2)
APTT BLD: 43.2 SEC — HIGH (ref 27–39.2)
APTT BLD: 45.9 SEC — HIGH (ref 27–39.2)
APTT BLD: 46.4 SEC — HIGH (ref 27–39.2)
APTT BLD: 48.4 SEC — HIGH (ref 27–39.2)
APTT BLD: 48.8 SEC — HIGH (ref 27–39.2)
APTT BLD: 53.2 SEC — HIGH (ref 27–39.2)
APTT BLD: 54.6 SEC — HIGH (ref 27–39.2)
APTT BLD: 61.2 SEC — HIGH (ref 27–39.2)
APTT BLD: 61.7 SEC — HIGH (ref 27–39.2)
APTT BLD: 62.7 SEC — HIGH (ref 27–39.2)
APTT BLD: 63.7 SEC — HIGH (ref 27–39.2)
APTT BLD: 64.4 SEC — HIGH (ref 27–39.2)
APTT BLD: 65 SEC — HIGH (ref 27–39.2)
APTT BLD: 65.8 SEC — HIGH (ref 27–39.2)
APTT BLD: 66.4 SEC — HIGH (ref 27–39.2)
APTT BLD: 67.7 SEC — HIGH (ref 27–39.2)
APTT BLD: 68.7 SEC — HIGH (ref 27–39.2)
APTT BLD: 70.3 SEC — CRITICAL HIGH (ref 27–39.2)
APTT BLD: 70.7 SEC — CRITICAL HIGH (ref 27–39.2)
APTT BLD: 74.8 SEC — CRITICAL HIGH (ref 27–39.2)
APTT BLD: 75.2 SEC — CRITICAL HIGH (ref 27–39.2)
APTT BLD: 77 SEC — CRITICAL HIGH (ref 27–39.2)
APTT BLD: 78.3 SEC — CRITICAL HIGH (ref 27–39.2)
APTT BLD: 79.8 SEC — CRITICAL HIGH (ref 27–39.2)
APTT BLD: 85.9 SEC — CRITICAL HIGH (ref 27–39.2)
APTT BLD: 86.6 SEC — CRITICAL HIGH (ref 27–39.2)
AST SERPL-CCNC: 28 U/L — SIGNIFICANT CHANGE UP (ref 0–41)
AST SERPL-CCNC: 30 U/L — SIGNIFICANT CHANGE UP (ref 0–41)
AST SERPL-CCNC: 31 U/L — SIGNIFICANT CHANGE UP (ref 0–41)
AST SERPL-CCNC: 33 U/L — SIGNIFICANT CHANGE UP (ref 0–41)
AST SERPL-CCNC: 34 U/L — SIGNIFICANT CHANGE UP (ref 0–41)
AST SERPL-CCNC: 34 U/L — SIGNIFICANT CHANGE UP (ref 0–41)
AST SERPL-CCNC: 36 U/L — SIGNIFICANT CHANGE UP (ref 0–41)
AST SERPL-CCNC: 37 U/L — SIGNIFICANT CHANGE UP (ref 0–41)
AST SERPL-CCNC: 37 U/L — SIGNIFICANT CHANGE UP (ref 0–41)
AST SERPL-CCNC: 39 U/L — SIGNIFICANT CHANGE UP (ref 0–41)
AST SERPL-CCNC: 40 U/L — SIGNIFICANT CHANGE UP (ref 0–41)
AST SERPL-CCNC: 41 U/L — SIGNIFICANT CHANGE UP (ref 0–41)
AST SERPL-CCNC: 43 U/L — HIGH (ref 0–41)
AST SERPL-CCNC: 44 U/L — HIGH (ref 0–41)
AST SERPL-CCNC: 45 U/L — HIGH (ref 0–41)
AST SERPL-CCNC: 48 U/L — HIGH (ref 0–41)
AST SERPL-CCNC: 51 U/L — HIGH (ref 0–41)
AST SERPL-CCNC: 52 U/L — HIGH (ref 0–41)
AST SERPL-CCNC: 52 U/L — HIGH (ref 0–41)
AST SERPL-CCNC: 55 U/L — HIGH (ref 0–41)
AST SERPL-CCNC: 56 U/L — HIGH (ref 0–41)
BASE EXCESS BLDA CALC-SCNC: -20.5 MMOL/L — LOW (ref -2–2)
BASOPHILS # BLD AUTO: 0.01 K/UL — SIGNIFICANT CHANGE UP (ref 0–0.2)
BASOPHILS # BLD AUTO: 0.02 K/UL — SIGNIFICANT CHANGE UP (ref 0–0.2)
BASOPHILS # BLD AUTO: 0.04 K/UL — SIGNIFICANT CHANGE UP (ref 0–0.2)
BASOPHILS # BLD AUTO: 0.05 K/UL — SIGNIFICANT CHANGE UP (ref 0–0.2)
BASOPHILS # BLD AUTO: 0.06 K/UL — SIGNIFICANT CHANGE UP (ref 0–0.2)
BASOPHILS # BLD AUTO: 0.06 K/UL — SIGNIFICANT CHANGE UP (ref 0–0.2)
BASOPHILS # BLD AUTO: 0.07 K/UL — SIGNIFICANT CHANGE UP (ref 0–0.2)
BASOPHILS # BLD AUTO: 0.07 K/UL — SIGNIFICANT CHANGE UP (ref 0–0.2)
BASOPHILS NFR BLD AUTO: 0.1 % — SIGNIFICANT CHANGE UP (ref 0–1)
BASOPHILS NFR BLD AUTO: 0.1 % — SIGNIFICANT CHANGE UP (ref 0–1)
BASOPHILS NFR BLD AUTO: 0.2 % — SIGNIFICANT CHANGE UP (ref 0–1)
BASOPHILS NFR BLD AUTO: 0.3 % — SIGNIFICANT CHANGE UP (ref 0–1)
BILIRUB SERPL-MCNC: 0.4 MG/DL — SIGNIFICANT CHANGE UP (ref 0.2–1.2)
BILIRUB SERPL-MCNC: 0.4 MG/DL — SIGNIFICANT CHANGE UP (ref 0.2–1.2)
BILIRUB SERPL-MCNC: 0.5 MG/DL — SIGNIFICANT CHANGE UP (ref 0.2–1.2)
BILIRUB SERPL-MCNC: 0.6 MG/DL — SIGNIFICANT CHANGE UP (ref 0.2–1.2)
BILIRUB SERPL-MCNC: 0.7 MG/DL — SIGNIFICANT CHANGE UP (ref 0.2–1.2)
BILIRUB SERPL-MCNC: 0.8 MG/DL — SIGNIFICANT CHANGE UP (ref 0.2–1.2)
BILIRUB SERPL-MCNC: 0.9 MG/DL — SIGNIFICANT CHANGE UP (ref 0.2–1.2)
BILIRUB SERPL-MCNC: 0.9 MG/DL — SIGNIFICANT CHANGE UP (ref 0.2–1.2)
BILIRUB SERPL-MCNC: 1 MG/DL — SIGNIFICANT CHANGE UP (ref 0.2–1.2)
BILIRUB SERPL-MCNC: 1 MG/DL — SIGNIFICANT CHANGE UP (ref 0.2–1.2)
BLD GP AB SCN SERPL QL: SIGNIFICANT CHANGE UP
BUN SERPL-MCNC: 100 MG/DL — CRITICAL HIGH (ref 10–20)
BUN SERPL-MCNC: 102 MG/DL — CRITICAL HIGH (ref 10–20)
BUN SERPL-MCNC: 102 MG/DL — CRITICAL HIGH (ref 10–20)
BUN SERPL-MCNC: 104 MG/DL — CRITICAL HIGH (ref 10–20)
BUN SERPL-MCNC: 107 MG/DL — CRITICAL HIGH (ref 10–20)
BUN SERPL-MCNC: 111 MG/DL — CRITICAL HIGH (ref 10–20)
BUN SERPL-MCNC: 120 MG/DL — CRITICAL HIGH (ref 10–20)
BUN SERPL-MCNC: 15 MG/DL — SIGNIFICANT CHANGE UP (ref 10–20)
BUN SERPL-MCNC: 17 MG/DL — SIGNIFICANT CHANGE UP (ref 10–20)
BUN SERPL-MCNC: 19 MG/DL — SIGNIFICANT CHANGE UP (ref 10–20)
BUN SERPL-MCNC: 26 MG/DL — HIGH (ref 10–20)
BUN SERPL-MCNC: 27 MG/DL — HIGH (ref 10–20)
BUN SERPL-MCNC: 27 MG/DL — HIGH (ref 10–20)
BUN SERPL-MCNC: 29 MG/DL — HIGH (ref 10–20)
BUN SERPL-MCNC: 38 MG/DL — HIGH (ref 10–20)
BUN SERPL-MCNC: 68 MG/DL — CRITICAL HIGH (ref 10–20)
BUN SERPL-MCNC: 68 MG/DL — CRITICAL HIGH (ref 10–20)
BUN SERPL-MCNC: 72 MG/DL — CRITICAL HIGH (ref 10–20)
BUN SERPL-MCNC: 72 MG/DL — CRITICAL HIGH (ref 10–20)
BUN SERPL-MCNC: 76 MG/DL — CRITICAL HIGH (ref 10–20)
BUN SERPL-MCNC: 77 MG/DL — CRITICAL HIGH (ref 10–20)
BUN SERPL-MCNC: 83 MG/DL — CRITICAL HIGH (ref 10–20)
BUN SERPL-MCNC: 84 MG/DL — CRITICAL HIGH (ref 10–20)
BUN SERPL-MCNC: 85 MG/DL — CRITICAL HIGH (ref 10–20)
CALCIUM SERPL-MCNC: 7.8 MG/DL — LOW (ref 8.5–10.1)
CALCIUM SERPL-MCNC: 8.1 MG/DL — LOW (ref 8.5–10.1)
CALCIUM SERPL-MCNC: 8.1 MG/DL — LOW (ref 8.5–10.1)
CALCIUM SERPL-MCNC: 8.2 MG/DL — LOW (ref 8.5–10.1)
CALCIUM SERPL-MCNC: 8.3 MG/DL — LOW (ref 8.5–10.1)
CALCIUM SERPL-MCNC: 8.5 MG/DL — SIGNIFICANT CHANGE UP (ref 8.5–10.1)
CALCIUM SERPL-MCNC: 8.5 MG/DL — SIGNIFICANT CHANGE UP (ref 8.5–10.1)
CALCIUM SERPL-MCNC: 8.6 MG/DL — SIGNIFICANT CHANGE UP (ref 8.5–10.1)
CALCIUM SERPL-MCNC: 8.7 MG/DL — SIGNIFICANT CHANGE UP (ref 8.5–10.1)
CALCIUM SERPL-MCNC: 8.8 MG/DL — SIGNIFICANT CHANGE UP (ref 8.5–10.1)
CALCIUM SERPL-MCNC: 8.9 MG/DL — SIGNIFICANT CHANGE UP (ref 8.5–10.1)
CALCIUM SERPL-MCNC: 9 MG/DL — SIGNIFICANT CHANGE UP (ref 8.5–10.1)
CALCIUM SERPL-MCNC: 9.1 MG/DL — SIGNIFICANT CHANGE UP (ref 8.5–10.1)
CHLORIDE SERPL-SCNC: 100 MMOL/L — SIGNIFICANT CHANGE UP (ref 98–110)
CHLORIDE SERPL-SCNC: 101 MMOL/L — SIGNIFICANT CHANGE UP (ref 98–110)
CHLORIDE SERPL-SCNC: 103 MMOL/L — SIGNIFICANT CHANGE UP (ref 98–110)
CHLORIDE SERPL-SCNC: 104 MMOL/L — SIGNIFICANT CHANGE UP (ref 98–110)
CHLORIDE SERPL-SCNC: 105 MMOL/L — SIGNIFICANT CHANGE UP (ref 98–110)
CHLORIDE SERPL-SCNC: 106 MMOL/L — SIGNIFICANT CHANGE UP (ref 98–110)
CHLORIDE SERPL-SCNC: 106 MMOL/L — SIGNIFICANT CHANGE UP (ref 98–110)
CHLORIDE SERPL-SCNC: 107 MMOL/L — SIGNIFICANT CHANGE UP (ref 98–110)
CHLORIDE SERPL-SCNC: 93 MMOL/L — LOW (ref 98–110)
CHLORIDE SERPL-SCNC: 95 MMOL/L — LOW (ref 98–110)
CHLORIDE SERPL-SCNC: 97 MMOL/L — LOW (ref 98–110)
CHLORIDE SERPL-SCNC: 99 MMOL/L — SIGNIFICANT CHANGE UP (ref 98–110)
CK SERPL-CCNC: 127 U/L — SIGNIFICANT CHANGE UP (ref 0–225)
CK SERPL-CCNC: 55 U/L — SIGNIFICANT CHANGE UP (ref 0–225)
CO2 SERPL-SCNC: 17 MMOL/L — SIGNIFICANT CHANGE UP (ref 17–32)
CO2 SERPL-SCNC: 18 MMOL/L — SIGNIFICANT CHANGE UP (ref 17–32)
CO2 SERPL-SCNC: 19 MMOL/L — SIGNIFICANT CHANGE UP (ref 17–32)
CO2 SERPL-SCNC: 20 MMOL/L — SIGNIFICANT CHANGE UP (ref 17–32)
CO2 SERPL-SCNC: 20 MMOL/L — SIGNIFICANT CHANGE UP (ref 17–32)
CO2 SERPL-SCNC: 21 MMOL/L — SIGNIFICANT CHANGE UP (ref 17–32)
CO2 SERPL-SCNC: 21 MMOL/L — SIGNIFICANT CHANGE UP (ref 17–32)
CO2 SERPL-SCNC: 22 MMOL/L — SIGNIFICANT CHANGE UP (ref 17–32)
CO2 SERPL-SCNC: 23 MMOL/L — SIGNIFICANT CHANGE UP (ref 17–32)
CO2 SERPL-SCNC: 24 MMOL/L — SIGNIFICANT CHANGE UP (ref 17–32)
CO2 SERPL-SCNC: 24 MMOL/L — SIGNIFICANT CHANGE UP (ref 17–32)
CO2 SERPL-SCNC: 25 MMOL/L — SIGNIFICANT CHANGE UP (ref 17–32)
CO2 SERPL-SCNC: 25 MMOL/L — SIGNIFICANT CHANGE UP (ref 17–32)
CO2 SERPL-SCNC: 26 MMOL/L — SIGNIFICANT CHANGE UP (ref 17–32)
CREAT SERPL-MCNC: 1 MG/DL — SIGNIFICANT CHANGE UP (ref 0.7–1.5)
CREAT SERPL-MCNC: 1 MG/DL — SIGNIFICANT CHANGE UP (ref 0.7–1.5)
CREAT SERPL-MCNC: 1.1 MG/DL — SIGNIFICANT CHANGE UP (ref 0.7–1.5)
CREAT SERPL-MCNC: 1.1 MG/DL — SIGNIFICANT CHANGE UP (ref 0.7–1.5)
CREAT SERPL-MCNC: 1.2 MG/DL — SIGNIFICANT CHANGE UP (ref 0.7–1.5)
CREAT SERPL-MCNC: 1.3 MG/DL — SIGNIFICANT CHANGE UP (ref 0.7–1.5)
CREAT SERPL-MCNC: 1.5 MG/DL — SIGNIFICANT CHANGE UP (ref 0.7–1.5)
CREAT SERPL-MCNC: 1.6 MG/DL — HIGH (ref 0.7–1.5)
CREAT SERPL-MCNC: 1.6 MG/DL — HIGH (ref 0.7–1.5)
CREAT SERPL-MCNC: 1.7 MG/DL — HIGH (ref 0.7–1.5)
CREAT SERPL-MCNC: 1.9 MG/DL — HIGH (ref 0.7–1.5)
CREAT SERPL-MCNC: 2 MG/DL — HIGH (ref 0.7–1.5)
CREAT SERPL-MCNC: 2.2 MG/DL — HIGH (ref 0.7–1.5)
CREAT SERPL-MCNC: 2.2 MG/DL — HIGH (ref 0.7–1.5)
CREAT SERPL-MCNC: 2.3 MG/DL — HIGH (ref 0.7–1.5)
CREAT SERPL-MCNC: 2.3 MG/DL — HIGH (ref 0.7–1.5)
CREAT SERPL-MCNC: 2.7 MG/DL — HIGH (ref 0.7–1.5)
CREAT SERPL-MCNC: 2.7 MG/DL — HIGH (ref 0.7–1.5)
CRP SERPL-MCNC: 0.82 MG/DL — HIGH (ref 0–0.4)
CRP SERPL-MCNC: 10.21 MG/DL — HIGH (ref 0–0.4)
CRP SERPL-MCNC: 13.5 MG/DL — HIGH (ref 0–0.4)
CRP SERPL-MCNC: 2.45 MG/DL — HIGH (ref 0–0.4)
CRP SERPL-MCNC: 48 MG/L — HIGH
CRP SERPL-MCNC: 5.76 MG/DL — HIGH (ref 0–0.4)
CRP SERPL-MCNC: 5.99 MG/DL — HIGH (ref 0–0.4)
CRP SERPL-MCNC: 51 MG/L — HIGH
CRP SERPL-MCNC: 6.78 MG/DL — HIGH (ref 0–0.4)
CRP SERPL-MCNC: 67 MG/L — HIGH
CRP SERPL-MCNC: 7.7 MG/DL — HIGH (ref 0–0.4)
CRP SERPL-MCNC: 8.53 MG/DL — HIGH (ref 0–0.4)
CRP SERPL-MCNC: 9.66 MG/DL — HIGH (ref 0–0.4)
CULTURE RESULTS: SIGNIFICANT CHANGE UP
D DIMER BLD IA.RAPID-MCNC: 1060 NG/ML DDU — HIGH (ref 0–230)
D DIMER BLD IA.RAPID-MCNC: 1066 NG/ML DDU — HIGH (ref 0–230)
D DIMER BLD IA.RAPID-MCNC: 155 NG/ML DDU — SIGNIFICANT CHANGE UP (ref 0–230)
D DIMER BLD IA.RAPID-MCNC: 197 NG/ML DDU — SIGNIFICANT CHANGE UP (ref 0–230)
D DIMER BLD IA.RAPID-MCNC: 2299 NG/ML DDU — HIGH (ref 0–230)
D DIMER BLD IA.RAPID-MCNC: 2766 NG/ML DDU — HIGH (ref 0–230)
D DIMER BLD IA.RAPID-MCNC: 2959 NG/ML DDU — HIGH (ref 0–230)
D DIMER BLD IA.RAPID-MCNC: 3251 NG/ML DDU — HIGH (ref 0–230)
D DIMER BLD IA.RAPID-MCNC: 355 NG/ML DDU — HIGH (ref 0–230)
D DIMER BLD IA.RAPID-MCNC: 3921 NG/ML DDU — HIGH (ref 0–230)
D DIMER BLD IA.RAPID-MCNC: 68 NG/ML DDU — SIGNIFICANT CHANGE UP (ref 0–230)
DIGOXIN SERPL-MCNC: 0.3 NG/ML — LOW (ref 0.8–2)
EOSINOPHIL # BLD AUTO: 0 K/UL — SIGNIFICANT CHANGE UP (ref 0–0.7)
EOSINOPHIL # BLD AUTO: 0.04 K/UL — SIGNIFICANT CHANGE UP (ref 0–0.7)
EOSINOPHIL # BLD AUTO: 0.06 K/UL — SIGNIFICANT CHANGE UP (ref 0–0.7)
EOSINOPHIL # BLD AUTO: 0.08 K/UL — SIGNIFICANT CHANGE UP (ref 0–0.7)
EOSINOPHIL # BLD AUTO: 0.09 K/UL — SIGNIFICANT CHANGE UP (ref 0–0.7)
EOSINOPHIL # BLD AUTO: 0.11 K/UL — SIGNIFICANT CHANGE UP (ref 0–0.7)
EOSINOPHIL # BLD AUTO: 0.24 K/UL — SIGNIFICANT CHANGE UP (ref 0–0.7)
EOSINOPHIL NFR BLD AUTO: 0 % — SIGNIFICANT CHANGE UP (ref 0–8)
EOSINOPHIL NFR BLD AUTO: 0.2 % — SIGNIFICANT CHANGE UP (ref 0–8)
EOSINOPHIL NFR BLD AUTO: 0.3 % — SIGNIFICANT CHANGE UP (ref 0–8)
EOSINOPHIL NFR BLD AUTO: 0.4 % — SIGNIFICANT CHANGE UP (ref 0–8)
EOSINOPHIL NFR BLD AUTO: 0.4 % — SIGNIFICANT CHANGE UP (ref 0–8)
EOSINOPHIL NFR BLD AUTO: 0.5 % — SIGNIFICANT CHANGE UP (ref 0–8)
EOSINOPHIL NFR BLD AUTO: 1.1 % — SIGNIFICANT CHANGE UP (ref 0–8)
ESTIMATED AVERAGE GLUCOSE: 269 MG/DL — HIGH (ref 68–114)
FERRITIN SERPL-MCNC: 1137 NG/ML — HIGH (ref 30–400)
FERRITIN SERPL-MCNC: 1231 NG/ML — HIGH (ref 30–400)
FERRITIN SERPL-MCNC: 1246 NG/ML — HIGH (ref 30–400)
FERRITIN SERPL-MCNC: 2398 NG/ML — HIGH (ref 30–400)
FERRITIN SERPL-MCNC: 2995 NG/ML — HIGH (ref 30–400)
FERRITIN SERPL-MCNC: 3133 NG/ML — HIGH (ref 30–400)
FERRITIN SERPL-MCNC: 982 NG/ML — HIGH (ref 30–400)
FSP PPP-MCNC: >=20 UG/ML
GLUCOSE BLDC GLUCOMTR-MCNC: 115 MG/DL — HIGH (ref 70–99)
GLUCOSE BLDC GLUCOMTR-MCNC: 120 MG/DL — HIGH (ref 70–99)
GLUCOSE BLDC GLUCOMTR-MCNC: 120 MG/DL — HIGH (ref 70–99)
GLUCOSE BLDC GLUCOMTR-MCNC: 121 MG/DL — HIGH (ref 70–99)
GLUCOSE BLDC GLUCOMTR-MCNC: 122 MG/DL — HIGH (ref 70–99)
GLUCOSE BLDC GLUCOMTR-MCNC: 123 MG/DL — HIGH (ref 70–99)
GLUCOSE BLDC GLUCOMTR-MCNC: 132 MG/DL — HIGH (ref 70–99)
GLUCOSE BLDC GLUCOMTR-MCNC: 137 MG/DL — HIGH (ref 70–99)
GLUCOSE BLDC GLUCOMTR-MCNC: 137 MG/DL — HIGH (ref 70–99)
GLUCOSE BLDC GLUCOMTR-MCNC: 139 MG/DL — HIGH (ref 70–99)
GLUCOSE BLDC GLUCOMTR-MCNC: 139 MG/DL — HIGH (ref 70–99)
GLUCOSE BLDC GLUCOMTR-MCNC: 151 MG/DL — HIGH (ref 70–99)
GLUCOSE BLDC GLUCOMTR-MCNC: 152 MG/DL — HIGH (ref 70–99)
GLUCOSE BLDC GLUCOMTR-MCNC: 154 MG/DL — HIGH (ref 70–99)
GLUCOSE BLDC GLUCOMTR-MCNC: 156 MG/DL — HIGH (ref 70–99)
GLUCOSE BLDC GLUCOMTR-MCNC: 157 MG/DL — HIGH (ref 70–99)
GLUCOSE BLDC GLUCOMTR-MCNC: 159 MG/DL — HIGH (ref 70–99)
GLUCOSE BLDC GLUCOMTR-MCNC: 159 MG/DL — HIGH (ref 70–99)
GLUCOSE BLDC GLUCOMTR-MCNC: 164 MG/DL — HIGH (ref 70–99)
GLUCOSE BLDC GLUCOMTR-MCNC: 165 MG/DL — HIGH (ref 70–99)
GLUCOSE BLDC GLUCOMTR-MCNC: 168 MG/DL — HIGH (ref 70–99)
GLUCOSE BLDC GLUCOMTR-MCNC: 168 MG/DL — HIGH (ref 70–99)
GLUCOSE BLDC GLUCOMTR-MCNC: 172 MG/DL — HIGH (ref 70–99)
GLUCOSE BLDC GLUCOMTR-MCNC: 176 MG/DL — HIGH (ref 70–99)
GLUCOSE BLDC GLUCOMTR-MCNC: 183 MG/DL — HIGH (ref 70–99)
GLUCOSE BLDC GLUCOMTR-MCNC: 188 MG/DL — HIGH (ref 70–99)
GLUCOSE BLDC GLUCOMTR-MCNC: 189 MG/DL — HIGH (ref 70–99)
GLUCOSE BLDC GLUCOMTR-MCNC: 189 MG/DL — HIGH (ref 70–99)
GLUCOSE BLDC GLUCOMTR-MCNC: 194 MG/DL — HIGH (ref 70–99)
GLUCOSE BLDC GLUCOMTR-MCNC: 195 MG/DL — HIGH (ref 70–99)
GLUCOSE BLDC GLUCOMTR-MCNC: 196 MG/DL — HIGH (ref 70–99)
GLUCOSE BLDC GLUCOMTR-MCNC: 199 MG/DL — HIGH (ref 70–99)
GLUCOSE BLDC GLUCOMTR-MCNC: 200 MG/DL — HIGH (ref 70–99)
GLUCOSE BLDC GLUCOMTR-MCNC: 200 MG/DL — HIGH (ref 70–99)
GLUCOSE BLDC GLUCOMTR-MCNC: 201 MG/DL — HIGH (ref 70–99)
GLUCOSE BLDC GLUCOMTR-MCNC: 204 MG/DL — HIGH (ref 70–99)
GLUCOSE BLDC GLUCOMTR-MCNC: 209 MG/DL — HIGH (ref 70–99)
GLUCOSE BLDC GLUCOMTR-MCNC: 212 MG/DL — HIGH (ref 70–99)
GLUCOSE BLDC GLUCOMTR-MCNC: 214 MG/DL — HIGH (ref 70–99)
GLUCOSE BLDC GLUCOMTR-MCNC: 215 MG/DL — HIGH (ref 70–99)
GLUCOSE BLDC GLUCOMTR-MCNC: 215 MG/DL — HIGH (ref 70–99)
GLUCOSE BLDC GLUCOMTR-MCNC: 220 MG/DL — HIGH (ref 70–99)
GLUCOSE BLDC GLUCOMTR-MCNC: 223 MG/DL — HIGH (ref 70–99)
GLUCOSE BLDC GLUCOMTR-MCNC: 224 MG/DL — HIGH (ref 70–99)
GLUCOSE BLDC GLUCOMTR-MCNC: 229 MG/DL — HIGH (ref 70–99)
GLUCOSE BLDC GLUCOMTR-MCNC: 230 MG/DL — HIGH (ref 70–99)
GLUCOSE BLDC GLUCOMTR-MCNC: 233 MG/DL — HIGH (ref 70–99)
GLUCOSE BLDC GLUCOMTR-MCNC: 233 MG/DL — HIGH (ref 70–99)
GLUCOSE BLDC GLUCOMTR-MCNC: 234 MG/DL — HIGH (ref 70–99)
GLUCOSE BLDC GLUCOMTR-MCNC: 236 MG/DL — HIGH (ref 70–99)
GLUCOSE BLDC GLUCOMTR-MCNC: 237 MG/DL — HIGH (ref 70–99)
GLUCOSE BLDC GLUCOMTR-MCNC: 239 MG/DL — HIGH (ref 70–99)
GLUCOSE BLDC GLUCOMTR-MCNC: 241 MG/DL — HIGH (ref 70–99)
GLUCOSE BLDC GLUCOMTR-MCNC: 246 MG/DL — HIGH (ref 70–99)
GLUCOSE BLDC GLUCOMTR-MCNC: 248 MG/DL — HIGH (ref 70–99)
GLUCOSE BLDC GLUCOMTR-MCNC: 254 MG/DL — HIGH (ref 70–99)
GLUCOSE BLDC GLUCOMTR-MCNC: 254 MG/DL — HIGH (ref 70–99)
GLUCOSE BLDC GLUCOMTR-MCNC: 257 MG/DL — HIGH (ref 70–99)
GLUCOSE BLDC GLUCOMTR-MCNC: 258 MG/DL — HIGH (ref 70–99)
GLUCOSE BLDC GLUCOMTR-MCNC: 262 MG/DL — HIGH (ref 70–99)
GLUCOSE BLDC GLUCOMTR-MCNC: 263 MG/DL — HIGH (ref 70–99)
GLUCOSE BLDC GLUCOMTR-MCNC: 267 MG/DL — HIGH (ref 70–99)
GLUCOSE BLDC GLUCOMTR-MCNC: 267 MG/DL — HIGH (ref 70–99)
GLUCOSE BLDC GLUCOMTR-MCNC: 271 MG/DL — HIGH (ref 70–99)
GLUCOSE BLDC GLUCOMTR-MCNC: 271 MG/DL — HIGH (ref 70–99)
GLUCOSE BLDC GLUCOMTR-MCNC: 277 MG/DL — HIGH (ref 70–99)
GLUCOSE BLDC GLUCOMTR-MCNC: 282 MG/DL — HIGH (ref 70–99)
GLUCOSE BLDC GLUCOMTR-MCNC: 284 MG/DL — HIGH (ref 70–99)
GLUCOSE BLDC GLUCOMTR-MCNC: 287 MG/DL — HIGH (ref 70–99)
GLUCOSE BLDC GLUCOMTR-MCNC: 287 MG/DL — HIGH (ref 70–99)
GLUCOSE BLDC GLUCOMTR-MCNC: 292 MG/DL — HIGH (ref 70–99)
GLUCOSE BLDC GLUCOMTR-MCNC: 297 MG/DL — HIGH (ref 70–99)
GLUCOSE BLDC GLUCOMTR-MCNC: 301 MG/DL — HIGH (ref 70–99)
GLUCOSE BLDC GLUCOMTR-MCNC: 301 MG/DL — HIGH (ref 70–99)
GLUCOSE BLDC GLUCOMTR-MCNC: 304 MG/DL — HIGH (ref 70–99)
GLUCOSE BLDC GLUCOMTR-MCNC: 308 MG/DL — HIGH (ref 70–99)
GLUCOSE BLDC GLUCOMTR-MCNC: 316 MG/DL — HIGH (ref 70–99)
GLUCOSE BLDC GLUCOMTR-MCNC: 323 MG/DL — HIGH (ref 70–99)
GLUCOSE BLDC GLUCOMTR-MCNC: 331 MG/DL — HIGH (ref 70–99)
GLUCOSE BLDC GLUCOMTR-MCNC: 342 MG/DL — HIGH (ref 70–99)
GLUCOSE BLDC GLUCOMTR-MCNC: 352 MG/DL — HIGH (ref 70–99)
GLUCOSE BLDC GLUCOMTR-MCNC: 367 MG/DL — HIGH (ref 70–99)
GLUCOSE BLDC GLUCOMTR-MCNC: 95 MG/DL — SIGNIFICANT CHANGE UP (ref 70–99)
GLUCOSE BLDC GLUCOMTR-MCNC: 97 MG/DL — SIGNIFICANT CHANGE UP (ref 70–99)
GLUCOSE SERPL-MCNC: 126 MG/DL — HIGH (ref 70–99)
GLUCOSE SERPL-MCNC: 129 MG/DL — HIGH (ref 70–99)
GLUCOSE SERPL-MCNC: 134 MG/DL — HIGH (ref 70–99)
GLUCOSE SERPL-MCNC: 157 MG/DL — HIGH (ref 70–99)
GLUCOSE SERPL-MCNC: 160 MG/DL — HIGH (ref 70–99)
GLUCOSE SERPL-MCNC: 177 MG/DL — HIGH (ref 70–99)
GLUCOSE SERPL-MCNC: 184 MG/DL — HIGH (ref 70–99)
GLUCOSE SERPL-MCNC: 186 MG/DL — HIGH (ref 70–99)
GLUCOSE SERPL-MCNC: 191 MG/DL — HIGH (ref 70–99)
GLUCOSE SERPL-MCNC: 192 MG/DL — HIGH (ref 70–99)
GLUCOSE SERPL-MCNC: 193 MG/DL — HIGH (ref 70–99)
GLUCOSE SERPL-MCNC: 198 MG/DL — HIGH (ref 70–99)
GLUCOSE SERPL-MCNC: 199 MG/DL — HIGH (ref 70–99)
GLUCOSE SERPL-MCNC: 204 MG/DL — HIGH (ref 70–99)
GLUCOSE SERPL-MCNC: 205 MG/DL — HIGH (ref 70–99)
GLUCOSE SERPL-MCNC: 212 MG/DL — HIGH (ref 70–99)
GLUCOSE SERPL-MCNC: 241 MG/DL — HIGH (ref 70–99)
GLUCOSE SERPL-MCNC: 246 MG/DL — HIGH (ref 70–99)
GLUCOSE SERPL-MCNC: 258 MG/DL — HIGH (ref 70–99)
GLUCOSE SERPL-MCNC: 259 MG/DL — HIGH (ref 70–99)
GLUCOSE SERPL-MCNC: 266 MG/DL — HIGH (ref 70–99)
GLUCOSE SERPL-MCNC: 278 MG/DL — HIGH (ref 70–99)
GLUCOSE SERPL-MCNC: 303 MG/DL — HIGH (ref 70–99)
GLUCOSE SERPL-MCNC: 317 MG/DL — HIGH (ref 70–99)
HCO3 BLDA-SCNC: 14 MMOL/L — LOW (ref 23–27)
HCT VFR BLD CALC: 45.5 % — SIGNIFICANT CHANGE UP (ref 42–52)
HCT VFR BLD CALC: 45.5 % — SIGNIFICANT CHANGE UP (ref 42–52)
HCT VFR BLD CALC: 46.4 % — SIGNIFICANT CHANGE UP (ref 42–52)
HCT VFR BLD CALC: 46.7 % — SIGNIFICANT CHANGE UP (ref 42–52)
HCT VFR BLD CALC: 47 % — SIGNIFICANT CHANGE UP (ref 42–52)
HCT VFR BLD CALC: 47.4 % — SIGNIFICANT CHANGE UP (ref 42–52)
HCT VFR BLD CALC: 47.4 % — SIGNIFICANT CHANGE UP (ref 42–52)
HCT VFR BLD CALC: 47.6 % — SIGNIFICANT CHANGE UP (ref 42–52)
HCT VFR BLD CALC: 47.6 % — SIGNIFICANT CHANGE UP (ref 42–52)
HCT VFR BLD CALC: 47.8 % — SIGNIFICANT CHANGE UP (ref 42–52)
HCT VFR BLD CALC: 48.1 % — SIGNIFICANT CHANGE UP (ref 42–52)
HCT VFR BLD CALC: 48.2 % — SIGNIFICANT CHANGE UP (ref 42–52)
HCT VFR BLD CALC: 48.3 % — SIGNIFICANT CHANGE UP (ref 42–52)
HCT VFR BLD CALC: 48.7 % — SIGNIFICANT CHANGE UP (ref 42–52)
HCT VFR BLD CALC: 48.8 % — SIGNIFICANT CHANGE UP (ref 42–52)
HCT VFR BLD CALC: 49.3 % — SIGNIFICANT CHANGE UP (ref 42–52)
HCT VFR BLD CALC: 50.4 % — SIGNIFICANT CHANGE UP (ref 42–52)
HCT VFR BLD CALC: 50.5 % — SIGNIFICANT CHANGE UP (ref 42–52)
HCT VFR BLD CALC: 51.8 % — SIGNIFICANT CHANGE UP (ref 42–52)
HCT VFR BLD CALC: 52.3 % — HIGH (ref 42–52)
HCT VFR BLD CALC: 54.5 % — HIGH (ref 42–52)
HCT VFR BLD CALC: 55.3 % — HIGH (ref 42–52)
HCV AB S/CO SERPL IA: 0.03 COI — SIGNIFICANT CHANGE UP
HCV AB SERPL-IMP: SIGNIFICANT CHANGE UP
HGB BLD-MCNC: 15.3 G/DL — SIGNIFICANT CHANGE UP (ref 14–18)
HGB BLD-MCNC: 15.4 G/DL — SIGNIFICANT CHANGE UP (ref 14–18)
HGB BLD-MCNC: 15.5 G/DL — SIGNIFICANT CHANGE UP (ref 14–18)
HGB BLD-MCNC: 15.6 G/DL — SIGNIFICANT CHANGE UP (ref 14–18)
HGB BLD-MCNC: 15.8 G/DL — SIGNIFICANT CHANGE UP (ref 14–18)
HGB BLD-MCNC: 15.9 G/DL — SIGNIFICANT CHANGE UP (ref 14–18)
HGB BLD-MCNC: 16 G/DL — SIGNIFICANT CHANGE UP (ref 14–18)
HGB BLD-MCNC: 16 G/DL — SIGNIFICANT CHANGE UP (ref 14–18)
HGB BLD-MCNC: 16.1 G/DL — SIGNIFICANT CHANGE UP (ref 14–18)
HGB BLD-MCNC: 16.1 G/DL — SIGNIFICANT CHANGE UP (ref 14–18)
HGB BLD-MCNC: 16.4 G/DL — SIGNIFICANT CHANGE UP (ref 14–18)
HGB BLD-MCNC: 16.4 G/DL — SIGNIFICANT CHANGE UP (ref 14–18)
HGB BLD-MCNC: 16.5 G/DL — SIGNIFICANT CHANGE UP (ref 14–18)
HGB BLD-MCNC: 16.9 G/DL — SIGNIFICANT CHANGE UP (ref 14–18)
HGB BLD-MCNC: 17 G/DL — SIGNIFICANT CHANGE UP (ref 14–18)
HGB BLD-MCNC: 17 G/DL — SIGNIFICANT CHANGE UP (ref 14–18)
HGB BLD-MCNC: 17.1 G/DL — SIGNIFICANT CHANGE UP (ref 14–18)
HGB BLD-MCNC: 18.1 G/DL — HIGH (ref 14–18)
HGB BLD-MCNC: 18.4 G/DL — HIGH (ref 14–18)
IMM GRANULOCYTES NFR BLD AUTO: 0.3 % — SIGNIFICANT CHANGE UP (ref 0.1–0.3)
IMM GRANULOCYTES NFR BLD AUTO: 0.3 % — SIGNIFICANT CHANGE UP (ref 0.1–0.3)
IMM GRANULOCYTES NFR BLD AUTO: 0.4 % — HIGH (ref 0.1–0.3)
IMM GRANULOCYTES NFR BLD AUTO: 0.4 % — HIGH (ref 0.1–0.3)
IMM GRANULOCYTES NFR BLD AUTO: 1.2 % — HIGH (ref 0.1–0.3)
IMM GRANULOCYTES NFR BLD AUTO: 1.2 % — HIGH (ref 0.1–0.3)
IMM GRANULOCYTES NFR BLD AUTO: 1.3 % — HIGH (ref 0.1–0.3)
IMM GRANULOCYTES NFR BLD AUTO: 1.5 % — HIGH (ref 0.1–0.3)
IMM GRANULOCYTES NFR BLD AUTO: 1.5 % — HIGH (ref 0.1–0.3)
IMM GRANULOCYTES NFR BLD AUTO: 1.6 % — HIGH (ref 0.1–0.3)
IMM GRANULOCYTES NFR BLD AUTO: 1.6 % — HIGH (ref 0.1–0.3)
IMM GRANULOCYTES NFR BLD AUTO: 1.8 % — HIGH (ref 0.1–0.3)
IMM GRANULOCYTES NFR BLD AUTO: 1.9 % — HIGH (ref 0.1–0.3)
IMM GRANULOCYTES NFR BLD AUTO: 1.9 % — HIGH (ref 0.1–0.3)
INR BLD: 1.2 RATIO — SIGNIFICANT CHANGE UP (ref 0.65–1.3)
INR BLD: 1.23 RATIO — SIGNIFICANT CHANGE UP (ref 0.65–1.3)
INR BLD: 1.24 RATIO — SIGNIFICANT CHANGE UP (ref 0.65–1.3)
INR BLD: 1.26 RATIO — SIGNIFICANT CHANGE UP (ref 0.65–1.3)
INR BLD: 1.26 RATIO — SIGNIFICANT CHANGE UP (ref 0.65–1.3)
INR BLD: 1.3 RATIO — SIGNIFICANT CHANGE UP (ref 0.65–1.3)
INR BLD: 1.32 RATIO — HIGH (ref 0.65–1.3)
INR BLD: 1.34 RATIO — HIGH (ref 0.65–1.3)
INR BLD: 1.45 RATIO — HIGH (ref 0.65–1.3)
INR BLD: 1.45 RATIO — HIGH (ref 0.65–1.3)
INR BLD: 1.53 RATIO — HIGH (ref 0.65–1.3)
INR BLD: 1.83 RATIO — HIGH (ref 0.65–1.3)
INR BLD: 1.93 RATIO — HIGH (ref 0.65–1.3)
INR BLD: 2.28 RATIO — HIGH (ref 0.65–1.3)
INR BLD: 2.95 RATIO — HIGH (ref 0.65–1.3)
INR BLD: 3.14 RATIO — HIGH (ref 0.65–1.3)
INR BLD: 3.17 RATIO — HIGH (ref 0.65–1.3)
INR BLD: 3.26 RATIO — HIGH (ref 0.65–1.3)
INR BLD: 3.45 RATIO — HIGH (ref 0.65–1.3)
INR PPP: 2.1 RATIO
LACTATE SERPL-SCNC: 2 MMOL/L — SIGNIFICANT CHANGE UP (ref 0.7–2)
LACTATE SERPL-SCNC: 2.4 MMOL/L — HIGH (ref 0.7–2)
LACTATE SERPL-SCNC: 3.7 MMOL/L — HIGH (ref 0.7–2)
LDH SERPL L TO P-CCNC: 263 U/L — HIGH (ref 50–242)
LDH SERPL L TO P-CCNC: 442 U/L — HIGH (ref 50–242)
LEGIONELLA AG UR QL: NEGATIVE — SIGNIFICANT CHANGE UP
LYMPHOCYTES # BLD AUTO: 0.24 K/UL — LOW (ref 1.2–3.4)
LYMPHOCYTES # BLD AUTO: 0.28 K/UL — LOW (ref 1.2–3.4)
LYMPHOCYTES # BLD AUTO: 0.32 K/UL — LOW (ref 1.2–3.4)
LYMPHOCYTES # BLD AUTO: 0.33 K/UL — LOW (ref 1.2–3.4)
LYMPHOCYTES # BLD AUTO: 0.36 K/UL — LOW (ref 1.2–3.4)
LYMPHOCYTES # BLD AUTO: 0.37 K/UL — LOW (ref 1.2–3.4)
LYMPHOCYTES # BLD AUTO: 0.43 K/UL — LOW (ref 1.2–3.4)
LYMPHOCYTES # BLD AUTO: 0.45 K/UL — LOW (ref 1.2–3.4)
LYMPHOCYTES # BLD AUTO: 0.5 K/UL — LOW (ref 1.2–3.4)
LYMPHOCYTES # BLD AUTO: 0.53 K/UL — LOW (ref 1.2–3.4)
LYMPHOCYTES # BLD AUTO: 0.58 K/UL — LOW (ref 1.2–3.4)
LYMPHOCYTES # BLD AUTO: 0.58 K/UL — LOW (ref 1.2–3.4)
LYMPHOCYTES # BLD AUTO: 0.59 K/UL — LOW (ref 1.2–3.4)
LYMPHOCYTES # BLD AUTO: 0.59 K/UL — LOW (ref 1.2–3.4)
LYMPHOCYTES # BLD AUTO: 0.63 K/UL — LOW (ref 1.2–3.4)
LYMPHOCYTES # BLD AUTO: 0.71 K/UL — LOW (ref 1.2–3.4)
LYMPHOCYTES # BLD AUTO: 1.2 % — LOW (ref 20.5–51.1)
LYMPHOCYTES # BLD AUTO: 1.3 % — LOW (ref 20.5–51.1)
LYMPHOCYTES # BLD AUTO: 1.5 % — LOW (ref 20.5–51.1)
LYMPHOCYTES # BLD AUTO: 1.7 % — LOW (ref 20.5–51.1)
LYMPHOCYTES # BLD AUTO: 1.8 % — LOW (ref 20.5–51.1)
LYMPHOCYTES # BLD AUTO: 1.8 % — LOW (ref 20.5–51.1)
LYMPHOCYTES # BLD AUTO: 12.3 % — LOW (ref 20.5–51.1)
LYMPHOCYTES # BLD AUTO: 2.1 % — LOW (ref 20.5–51.1)
LYMPHOCYTES # BLD AUTO: 2.2 % — LOW (ref 20.5–51.1)
LYMPHOCYTES # BLD AUTO: 2.4 % — LOW (ref 20.5–51.1)
LYMPHOCYTES # BLD AUTO: 2.6 % — LOW (ref 20.5–51.1)
LYMPHOCYTES # BLD AUTO: 2.6 % — LOW (ref 20.5–51.1)
LYMPHOCYTES # BLD AUTO: 2.7 % — LOW (ref 20.5–51.1)
LYMPHOCYTES # BLD AUTO: 6.4 % — LOW (ref 20.5–51.1)
LYMPHOCYTES # BLD AUTO: 7.7 % — LOW (ref 20.5–51.1)
LYMPHOCYTES # BLD AUTO: 9.3 % — LOW (ref 20.5–51.1)
MAGNESIUM SERPL-MCNC: 1.8 MG/DL — SIGNIFICANT CHANGE UP (ref 1.8–2.4)
MAGNESIUM SERPL-MCNC: 2 MG/DL — SIGNIFICANT CHANGE UP (ref 1.8–2.4)
MAGNESIUM SERPL-MCNC: 2.1 MG/DL — SIGNIFICANT CHANGE UP (ref 1.8–2.4)
MAGNESIUM SERPL-MCNC: 3.1 MG/DL — CRITICAL HIGH (ref 1.8–2.4)
MAGNESIUM SERPL-MCNC: 3.2 MG/DL — CRITICAL HIGH (ref 1.8–2.4)
MAGNESIUM SERPL-MCNC: 3.2 MG/DL — CRITICAL HIGH (ref 1.8–2.4)
MAGNESIUM SERPL-MCNC: 3.3 MG/DL — CRITICAL HIGH (ref 1.8–2.4)
MAGNESIUM SERPL-MCNC: 3.3 MG/DL — CRITICAL HIGH (ref 1.8–2.4)
MAGNESIUM SERPL-MCNC: 3.4 MG/DL — CRITICAL HIGH (ref 1.8–2.4)
MAGNESIUM SERPL-MCNC: 3.4 MG/DL — CRITICAL HIGH (ref 1.8–2.4)
MAGNESIUM SERPL-MCNC: 3.6 MG/DL — CRITICAL HIGH (ref 1.8–2.4)
MAGNESIUM SERPL-MCNC: 3.7 MG/DL — CRITICAL HIGH (ref 1.8–2.4)
MAGNESIUM SERPL-MCNC: 3.8 MG/DL — CRITICAL HIGH (ref 1.8–2.4)
MAGNESIUM SERPL-MCNC: 3.9 MG/DL — CRITICAL HIGH (ref 1.8–2.4)
MCHC RBC-ENTMCNC: 30.2 PG — SIGNIFICANT CHANGE UP (ref 27–31)
MCHC RBC-ENTMCNC: 30.2 PG — SIGNIFICANT CHANGE UP (ref 27–31)
MCHC RBC-ENTMCNC: 30.4 PG — SIGNIFICANT CHANGE UP (ref 27–31)
MCHC RBC-ENTMCNC: 30.5 PG — SIGNIFICANT CHANGE UP (ref 27–31)
MCHC RBC-ENTMCNC: 30.5 PG — SIGNIFICANT CHANGE UP (ref 27–31)
MCHC RBC-ENTMCNC: 30.6 PG — SIGNIFICANT CHANGE UP (ref 27–31)
MCHC RBC-ENTMCNC: 30.7 PG — SIGNIFICANT CHANGE UP (ref 27–31)
MCHC RBC-ENTMCNC: 30.7 PG — SIGNIFICANT CHANGE UP (ref 27–31)
MCHC RBC-ENTMCNC: 30.8 PG — SIGNIFICANT CHANGE UP (ref 27–31)
MCHC RBC-ENTMCNC: 31 PG — SIGNIFICANT CHANGE UP (ref 27–31)
MCHC RBC-ENTMCNC: 31 PG — SIGNIFICANT CHANGE UP (ref 27–31)
MCHC RBC-ENTMCNC: 31.1 PG — HIGH (ref 27–31)
MCHC RBC-ENTMCNC: 31.1 PG — HIGH (ref 27–31)
MCHC RBC-ENTMCNC: 31.2 PG — HIGH (ref 27–31)
MCHC RBC-ENTMCNC: 31.3 PG — HIGH (ref 27–31)
MCHC RBC-ENTMCNC: 31.4 PG — HIGH (ref 27–31)
MCHC RBC-ENTMCNC: 32.5 G/DL — SIGNIFICANT CHANGE UP (ref 32–37)
MCHC RBC-ENTMCNC: 32.5 G/DL — SIGNIFICANT CHANGE UP (ref 32–37)
MCHC RBC-ENTMCNC: 32.7 G/DL — SIGNIFICANT CHANGE UP (ref 32–37)
MCHC RBC-ENTMCNC: 32.8 G/DL — SIGNIFICANT CHANGE UP (ref 32–37)
MCHC RBC-ENTMCNC: 32.9 G/DL — SIGNIFICANT CHANGE UP (ref 32–37)
MCHC RBC-ENTMCNC: 32.9 G/DL — SIGNIFICANT CHANGE UP (ref 32–37)
MCHC RBC-ENTMCNC: 33.2 G/DL — SIGNIFICANT CHANGE UP (ref 32–37)
MCHC RBC-ENTMCNC: 33.3 G/DL — SIGNIFICANT CHANGE UP (ref 32–37)
MCHC RBC-ENTMCNC: 33.4 G/DL — SIGNIFICANT CHANGE UP (ref 32–37)
MCHC RBC-ENTMCNC: 33.5 G/DL — SIGNIFICANT CHANGE UP (ref 32–37)
MCHC RBC-ENTMCNC: 33.5 G/DL — SIGNIFICANT CHANGE UP (ref 32–37)
MCHC RBC-ENTMCNC: 33.6 G/DL — SIGNIFICANT CHANGE UP (ref 32–37)
MCHC RBC-ENTMCNC: 33.6 G/DL — SIGNIFICANT CHANGE UP (ref 32–37)
MCHC RBC-ENTMCNC: 33.7 G/DL — SIGNIFICANT CHANGE UP (ref 32–37)
MCHC RBC-ENTMCNC: 33.7 G/DL — SIGNIFICANT CHANGE UP (ref 32–37)
MCHC RBC-ENTMCNC: 33.8 G/DL — SIGNIFICANT CHANGE UP (ref 32–37)
MCHC RBC-ENTMCNC: 33.8 G/DL — SIGNIFICANT CHANGE UP (ref 32–37)
MCHC RBC-ENTMCNC: 33.9 G/DL — SIGNIFICANT CHANGE UP (ref 32–37)
MCHC RBC-ENTMCNC: 34.3 G/DL — SIGNIFICANT CHANGE UP (ref 32–37)
MCHC RBC-ENTMCNC: 34.7 G/DL — SIGNIFICANT CHANGE UP (ref 32–37)
MCV RBC AUTO: 89.9 FL — SIGNIFICANT CHANGE UP (ref 80–94)
MCV RBC AUTO: 90.3 FL — SIGNIFICANT CHANGE UP (ref 80–94)
MCV RBC AUTO: 91 FL — SIGNIFICANT CHANGE UP (ref 80–94)
MCV RBC AUTO: 91.2 FL — SIGNIFICANT CHANGE UP (ref 80–94)
MCV RBC AUTO: 91.3 FL — SIGNIFICANT CHANGE UP (ref 80–94)
MCV RBC AUTO: 91.4 FL — SIGNIFICANT CHANGE UP (ref 80–94)
MCV RBC AUTO: 91.5 FL — SIGNIFICANT CHANGE UP (ref 80–94)
MCV RBC AUTO: 91.7 FL — SIGNIFICANT CHANGE UP (ref 80–94)
MCV RBC AUTO: 91.8 FL — SIGNIFICANT CHANGE UP (ref 80–94)
MCV RBC AUTO: 91.8 FL — SIGNIFICANT CHANGE UP (ref 80–94)
MCV RBC AUTO: 92.1 FL — SIGNIFICANT CHANGE UP (ref 80–94)
MCV RBC AUTO: 92.2 FL — SIGNIFICANT CHANGE UP (ref 80–94)
MCV RBC AUTO: 92.4 FL — SIGNIFICANT CHANGE UP (ref 80–94)
MCV RBC AUTO: 92.5 FL — SIGNIFICANT CHANGE UP (ref 80–94)
MCV RBC AUTO: 93 FL — SIGNIFICANT CHANGE UP (ref 80–94)
MCV RBC AUTO: 93.1 FL — SIGNIFICANT CHANGE UP (ref 80–94)
MCV RBC AUTO: 93.2 FL — SIGNIFICANT CHANGE UP (ref 80–94)
MCV RBC AUTO: 93.9 FL — SIGNIFICANT CHANGE UP (ref 80–94)
MCV RBC AUTO: 94.1 FL — HIGH (ref 80–94)
MCV RBC AUTO: 94.5 FL — HIGH (ref 80–94)
MCV RBC AUTO: 95.1 FL — HIGH (ref 80–94)
MCV RBC AUTO: 96.3 FL — HIGH (ref 80–94)
MONOCYTES # BLD AUTO: 0.35 K/UL — SIGNIFICANT CHANGE UP (ref 0.1–0.6)
MONOCYTES # BLD AUTO: 0.42 K/UL — SIGNIFICANT CHANGE UP (ref 0.1–0.6)
MONOCYTES # BLD AUTO: 0.44 K/UL — SIGNIFICANT CHANGE UP (ref 0.1–0.6)
MONOCYTES # BLD AUTO: 0.45 K/UL — SIGNIFICANT CHANGE UP (ref 0.1–0.6)
MONOCYTES # BLD AUTO: 0.45 K/UL — SIGNIFICANT CHANGE UP (ref 0.1–0.6)
MONOCYTES # BLD AUTO: 0.47 K/UL — SIGNIFICANT CHANGE UP (ref 0.1–0.6)
MONOCYTES # BLD AUTO: 0.47 K/UL — SIGNIFICANT CHANGE UP (ref 0.1–0.6)
MONOCYTES # BLD AUTO: 0.49 K/UL — SIGNIFICANT CHANGE UP (ref 0.1–0.6)
MONOCYTES # BLD AUTO: 0.5 K/UL — SIGNIFICANT CHANGE UP (ref 0.1–0.6)
MONOCYTES # BLD AUTO: 0.5 K/UL — SIGNIFICANT CHANGE UP (ref 0.1–0.6)
MONOCYTES # BLD AUTO: 0.51 K/UL — SIGNIFICANT CHANGE UP (ref 0.1–0.6)
MONOCYTES # BLD AUTO: 0.55 K/UL — SIGNIFICANT CHANGE UP (ref 0.1–0.6)
MONOCYTES # BLD AUTO: 0.6 K/UL — SIGNIFICANT CHANGE UP (ref 0.1–0.6)
MONOCYTES # BLD AUTO: 0.62 K/UL — HIGH (ref 0.1–0.6)
MONOCYTES # BLD AUTO: 0.62 K/UL — HIGH (ref 0.1–0.6)
MONOCYTES # BLD AUTO: 0.75 K/UL — HIGH (ref 0.1–0.6)
MONOCYTES NFR BLD AUTO: 1.6 % — LOW (ref 1.7–9.3)
MONOCYTES NFR BLD AUTO: 2 % — SIGNIFICANT CHANGE UP (ref 1.7–9.3)
MONOCYTES NFR BLD AUTO: 2.1 % — SIGNIFICANT CHANGE UP (ref 1.7–9.3)
MONOCYTES NFR BLD AUTO: 2.1 % — SIGNIFICANT CHANGE UP (ref 1.7–9.3)
MONOCYTES NFR BLD AUTO: 2.3 % — SIGNIFICANT CHANGE UP (ref 1.7–9.3)
MONOCYTES NFR BLD AUTO: 2.3 % — SIGNIFICANT CHANGE UP (ref 1.7–9.3)
MONOCYTES NFR BLD AUTO: 2.4 % — SIGNIFICANT CHANGE UP (ref 1.7–9.3)
MONOCYTES NFR BLD AUTO: 2.4 % — SIGNIFICANT CHANGE UP (ref 1.7–9.3)
MONOCYTES NFR BLD AUTO: 2.7 % — SIGNIFICANT CHANGE UP (ref 1.7–9.3)
MONOCYTES NFR BLD AUTO: 2.9 % — SIGNIFICANT CHANGE UP (ref 1.7–9.3)
MONOCYTES NFR BLD AUTO: 3.1 % — SIGNIFICANT CHANGE UP (ref 1.7–9.3)
MONOCYTES NFR BLD AUTO: 3.4 % — SIGNIFICANT CHANGE UP (ref 1.7–9.3)
MONOCYTES NFR BLD AUTO: 6.3 % — SIGNIFICANT CHANGE UP (ref 1.7–9.3)
MONOCYTES NFR BLD AUTO: 6.6 % — SIGNIFICANT CHANGE UP (ref 1.7–9.3)
MONOCYTES NFR BLD AUTO: 7.4 % — SIGNIFICANT CHANGE UP (ref 1.7–9.3)
MONOCYTES NFR BLD AUTO: 8.8 % — SIGNIFICANT CHANGE UP (ref 1.7–9.3)
NEUTROPHILS # BLD AUTO: 18.34 K/UL — HIGH (ref 1.4–6.5)
NEUTROPHILS # BLD AUTO: 18.41 K/UL — HIGH (ref 1.4–6.5)
NEUTROPHILS # BLD AUTO: 18.71 K/UL — HIGH (ref 1.4–6.5)
NEUTROPHILS # BLD AUTO: 18.77 K/UL — HIGH (ref 1.4–6.5)
NEUTROPHILS # BLD AUTO: 18.82 K/UL — HIGH (ref 1.4–6.5)
NEUTROPHILS # BLD AUTO: 19.13 K/UL — HIGH (ref 1.4–6.5)
NEUTROPHILS # BLD AUTO: 19.4 K/UL — HIGH (ref 1.4–6.5)
NEUTROPHILS # BLD AUTO: 19.54 K/UL — HIGH (ref 1.4–6.5)
NEUTROPHILS # BLD AUTO: 19.72 K/UL — HIGH (ref 1.4–6.5)
NEUTROPHILS # BLD AUTO: 20.33 K/UL — HIGH (ref 1.4–6.5)
NEUTROPHILS # BLD AUTO: 20.4 K/UL — HIGH (ref 1.4–6.5)
NEUTROPHILS # BLD AUTO: 20.81 K/UL — HIGH (ref 1.4–6.5)
NEUTROPHILS # BLD AUTO: 4.52 K/UL — SIGNIFICANT CHANGE UP (ref 1.4–6.5)
NEUTROPHILS # BLD AUTO: 5.28 K/UL — SIGNIFICANT CHANGE UP (ref 1.4–6.5)
NEUTROPHILS # BLD AUTO: 6.43 K/UL — SIGNIFICANT CHANGE UP (ref 1.4–6.5)
NEUTROPHILS # BLD AUTO: 8.47 K/UL — HIGH (ref 1.4–6.5)
NEUTROPHILS NFR BLD AUTO: 78.3 % — HIGH (ref 42.2–75.2)
NEUTROPHILS NFR BLD AUTO: 82.8 % — HIGH (ref 42.2–75.2)
NEUTROPHILS NFR BLD AUTO: 85.2 % — HIGH (ref 42.2–75.2)
NEUTROPHILS NFR BLD AUTO: 86.8 % — HIGH (ref 42.2–75.2)
NEUTROPHILS NFR BLD AUTO: 93.1 % — HIGH (ref 42.2–75.2)
NEUTROPHILS NFR BLD AUTO: 93.1 % — HIGH (ref 42.2–75.2)
NEUTROPHILS NFR BLD AUTO: 93.2 % — HIGH (ref 42.2–75.2)
NEUTROPHILS NFR BLD AUTO: 93.3 % — HIGH (ref 42.2–75.2)
NEUTROPHILS NFR BLD AUTO: 93.4 % — HIGH (ref 42.2–75.2)
NEUTROPHILS NFR BLD AUTO: 93.4 % — HIGH (ref 42.2–75.2)
NEUTROPHILS NFR BLD AUTO: 93.5 % — HIGH (ref 42.2–75.2)
NEUTROPHILS NFR BLD AUTO: 93.5 % — HIGH (ref 42.2–75.2)
NEUTROPHILS NFR BLD AUTO: 93.7 % — HIGH (ref 42.2–75.2)
NEUTROPHILS NFR BLD AUTO: 93.8 % — HIGH (ref 42.2–75.2)
NEUTROPHILS NFR BLD AUTO: 93.8 % — HIGH (ref 42.2–75.2)
NEUTROPHILS NFR BLD AUTO: 94.1 % — HIGH (ref 42.2–75.2)
NRBC # BLD: 0 /100 WBCS — SIGNIFICANT CHANGE UP (ref 0–0)
NT-PROBNP SERPL-SCNC: 1086 PG/ML — HIGH (ref 0–300)
NT-PROBNP SERPL-SCNC: 718 PG/ML — HIGH (ref 0–300)
NT-PROBNP SERPL-SCNC: 829 PG/ML — HIGH (ref 0–300)
PCO2 BLDA: 78 MMHG — CRITICAL HIGH (ref 38–42)
PH BLDA: 6.87 — CRITICAL LOW (ref 7.38–7.42)
PHOSPHATE SERPL-MCNC: 4.1 MG/DL — SIGNIFICANT CHANGE UP (ref 2.1–4.9)
PHOSPHATE SERPL-MCNC: 4.2 MG/DL — SIGNIFICANT CHANGE UP (ref 2.1–4.9)
PLATELET # BLD AUTO: 135 K/UL — SIGNIFICANT CHANGE UP (ref 130–400)
PLATELET # BLD AUTO: 155 K/UL — SIGNIFICANT CHANGE UP (ref 130–400)
PLATELET # BLD AUTO: 175 K/UL — SIGNIFICANT CHANGE UP (ref 130–400)
PLATELET # BLD AUTO: 203 K/UL — SIGNIFICANT CHANGE UP (ref 130–400)
PLATELET # BLD AUTO: 220 K/UL — SIGNIFICANT CHANGE UP (ref 130–400)
PLATELET # BLD AUTO: 224 K/UL — SIGNIFICANT CHANGE UP (ref 130–400)
PLATELET # BLD AUTO: 224 K/UL — SIGNIFICANT CHANGE UP (ref 130–400)
PLATELET # BLD AUTO: 226 K/UL — SIGNIFICANT CHANGE UP (ref 130–400)
PLATELET # BLD AUTO: 228 K/UL — SIGNIFICANT CHANGE UP (ref 130–400)
PLATELET # BLD AUTO: 233 K/UL — SIGNIFICANT CHANGE UP (ref 130–400)
PLATELET # BLD AUTO: 235 K/UL — SIGNIFICANT CHANGE UP (ref 130–400)
PLATELET # BLD AUTO: 239 K/UL — SIGNIFICANT CHANGE UP (ref 130–400)
PLATELET # BLD AUTO: 245 K/UL — SIGNIFICANT CHANGE UP (ref 130–400)
PLATELET # BLD AUTO: 256 K/UL — SIGNIFICANT CHANGE UP (ref 130–400)
PLATELET # BLD AUTO: 261 K/UL — SIGNIFICANT CHANGE UP (ref 130–400)
PLATELET # BLD AUTO: 262 K/UL — SIGNIFICANT CHANGE UP (ref 130–400)
PLATELET # BLD AUTO: 281 K/UL — SIGNIFICANT CHANGE UP (ref 130–400)
PLATELET # BLD AUTO: 289 K/UL — SIGNIFICANT CHANGE UP (ref 130–400)
PLATELET # BLD AUTO: 294 K/UL — SIGNIFICANT CHANGE UP (ref 130–400)
PLATELET # BLD AUTO: 302 K/UL — SIGNIFICANT CHANGE UP (ref 130–400)
PLATELET # BLD AUTO: 349 K/UL — SIGNIFICANT CHANGE UP (ref 130–400)
PLATELET # BLD AUTO: 365 K/UL — SIGNIFICANT CHANGE UP (ref 130–400)
PO2 BLDA: 39 MMHG — CRITICAL LOW (ref 78–95)
POCT-PROTHROMBIN TIME: 25.1 SECS
POTASSIUM SERPL-MCNC: 4 MMOL/L — SIGNIFICANT CHANGE UP (ref 3.5–5)
POTASSIUM SERPL-MCNC: 4.1 MMOL/L — SIGNIFICANT CHANGE UP (ref 3.5–5)
POTASSIUM SERPL-MCNC: 4.6 MMOL/L — SIGNIFICANT CHANGE UP (ref 3.5–5)
POTASSIUM SERPL-MCNC: 4.6 MMOL/L — SIGNIFICANT CHANGE UP (ref 3.5–5)
POTASSIUM SERPL-MCNC: 4.7 MMOL/L — SIGNIFICANT CHANGE UP (ref 3.5–5)
POTASSIUM SERPL-MCNC: 4.8 MMOL/L — SIGNIFICANT CHANGE UP (ref 3.5–5)
POTASSIUM SERPL-MCNC: 4.9 MMOL/L — SIGNIFICANT CHANGE UP (ref 3.5–5)
POTASSIUM SERPL-MCNC: 4.9 MMOL/L — SIGNIFICANT CHANGE UP (ref 3.5–5)
POTASSIUM SERPL-MCNC: 5 MMOL/L — SIGNIFICANT CHANGE UP (ref 3.5–5)
POTASSIUM SERPL-MCNC: 5 MMOL/L — SIGNIFICANT CHANGE UP (ref 3.5–5)
POTASSIUM SERPL-MCNC: 5.1 MMOL/L — HIGH (ref 3.5–5)
POTASSIUM SERPL-MCNC: 5.2 MMOL/L — HIGH (ref 3.5–5)
POTASSIUM SERPL-MCNC: 5.2 MMOL/L — HIGH (ref 3.5–5)
POTASSIUM SERPL-MCNC: 5.3 MMOL/L — HIGH (ref 3.5–5)
POTASSIUM SERPL-MCNC: 5.4 MMOL/L — HIGH (ref 3.5–5)
POTASSIUM SERPL-MCNC: 5.4 MMOL/L — HIGH (ref 3.5–5)
POTASSIUM SERPL-MCNC: 5.6 MMOL/L — HIGH (ref 3.5–5)
POTASSIUM SERPL-MCNC: 5.7 MMOL/L — HIGH (ref 3.5–5)
POTASSIUM SERPL-MCNC: 5.9 MMOL/L — HIGH (ref 3.5–5)
POTASSIUM SERPL-SCNC: 4 MMOL/L — SIGNIFICANT CHANGE UP (ref 3.5–5)
POTASSIUM SERPL-SCNC: 4.1 MMOL/L — SIGNIFICANT CHANGE UP (ref 3.5–5)
POTASSIUM SERPL-SCNC: 4.6 MMOL/L — SIGNIFICANT CHANGE UP (ref 3.5–5)
POTASSIUM SERPL-SCNC: 4.6 MMOL/L — SIGNIFICANT CHANGE UP (ref 3.5–5)
POTASSIUM SERPL-SCNC: 4.7 MMOL/L — SIGNIFICANT CHANGE UP (ref 3.5–5)
POTASSIUM SERPL-SCNC: 4.8 MMOL/L — SIGNIFICANT CHANGE UP (ref 3.5–5)
POTASSIUM SERPL-SCNC: 4.9 MMOL/L — SIGNIFICANT CHANGE UP (ref 3.5–5)
POTASSIUM SERPL-SCNC: 4.9 MMOL/L — SIGNIFICANT CHANGE UP (ref 3.5–5)
POTASSIUM SERPL-SCNC: 5 MMOL/L — SIGNIFICANT CHANGE UP (ref 3.5–5)
POTASSIUM SERPL-SCNC: 5 MMOL/L — SIGNIFICANT CHANGE UP (ref 3.5–5)
POTASSIUM SERPL-SCNC: 5.1 MMOL/L — HIGH (ref 3.5–5)
POTASSIUM SERPL-SCNC: 5.2 MMOL/L — HIGH (ref 3.5–5)
POTASSIUM SERPL-SCNC: 5.2 MMOL/L — HIGH (ref 3.5–5)
POTASSIUM SERPL-SCNC: 5.3 MMOL/L — HIGH (ref 3.5–5)
POTASSIUM SERPL-SCNC: 5.4 MMOL/L — HIGH (ref 3.5–5)
POTASSIUM SERPL-SCNC: 5.4 MMOL/L — HIGH (ref 3.5–5)
POTASSIUM SERPL-SCNC: 5.6 MMOL/L — HIGH (ref 3.5–5)
POTASSIUM SERPL-SCNC: 5.7 MMOL/L — HIGH (ref 3.5–5)
POTASSIUM SERPL-SCNC: 5.9 MMOL/L — HIGH (ref 3.5–5)
PROCALCITONIN SERPL-MCNC: 0.05 NG/ML — SIGNIFICANT CHANGE UP (ref 0.02–0.1)
PROCALCITONIN SERPL-MCNC: 0.06 NG/ML — SIGNIFICANT CHANGE UP (ref 0.02–0.1)
PROCALCITONIN SERPL-MCNC: 0.07 NG/ML — SIGNIFICANT CHANGE UP (ref 0.02–0.1)
PROCALCITONIN SERPL-MCNC: 0.07 NG/ML — SIGNIFICANT CHANGE UP (ref 0.02–0.1)
PROCALCITONIN SERPL-MCNC: 0.08 NG/ML — SIGNIFICANT CHANGE UP (ref 0.02–0.1)
PROCALCITONIN SERPL-MCNC: 0.12 NG/ML — HIGH (ref 0.02–0.1)
PROCALCITONIN SERPL-MCNC: 0.13 NG/ML — HIGH (ref 0.02–0.1)
PROCALCITONIN SERPL-MCNC: 0.18 NG/ML — HIGH (ref 0.02–0.1)
PROCALCITONIN SERPL-MCNC: 0.18 NG/ML — HIGH (ref 0.02–0.1)
PROCALCITONIN SERPL-MCNC: 0.24 NG/ML — HIGH (ref 0.02–0.1)
PROCALCITONIN SERPL-MCNC: 0.28 NG/ML — HIGH (ref 0.02–0.1)
PROT SERPL-MCNC: 5.5 G/DL — LOW (ref 6–8)
PROT SERPL-MCNC: 5.7 G/DL — LOW (ref 6–8)
PROT SERPL-MCNC: 5.9 G/DL — LOW (ref 6–8)
PROT SERPL-MCNC: 5.9 G/DL — LOW (ref 6–8)
PROT SERPL-MCNC: 6 G/DL — SIGNIFICANT CHANGE UP (ref 6–8)
PROT SERPL-MCNC: 6 G/DL — SIGNIFICANT CHANGE UP (ref 6–8)
PROT SERPL-MCNC: 6.1 G/DL — SIGNIFICANT CHANGE UP (ref 6–8)
PROT SERPL-MCNC: 6.2 G/DL — SIGNIFICANT CHANGE UP (ref 6–8)
PROT SERPL-MCNC: 6.2 G/DL — SIGNIFICANT CHANGE UP (ref 6–8)
PROT SERPL-MCNC: 6.4 G/DL — SIGNIFICANT CHANGE UP (ref 6–8)
PROT SERPL-MCNC: 6.4 G/DL — SIGNIFICANT CHANGE UP (ref 6–8)
PROT SERPL-MCNC: 6.5 G/DL — SIGNIFICANT CHANGE UP (ref 6–8)
PROT SERPL-MCNC: 6.6 G/DL — SIGNIFICANT CHANGE UP (ref 6–8)
PROT SERPL-MCNC: 6.8 G/DL — SIGNIFICANT CHANGE UP (ref 6–8)
PROT SERPL-MCNC: 7.1 G/DL — SIGNIFICANT CHANGE UP (ref 6–8)
PROT SERPL-MCNC: 7.2 G/DL — SIGNIFICANT CHANGE UP (ref 6–8)
PROTHROM AB SERPL-ACNC: 13.8 SEC — HIGH (ref 9.95–12.87)
PROTHROM AB SERPL-ACNC: 14.2 SEC — HIGH (ref 9.95–12.87)
PROTHROM AB SERPL-ACNC: 14.3 SEC — HIGH (ref 9.95–12.87)
PROTHROM AB SERPL-ACNC: 14.5 SEC — HIGH (ref 9.95–12.87)
PROTHROM AB SERPL-ACNC: 14.5 SEC — HIGH (ref 9.95–12.87)
PROTHROM AB SERPL-ACNC: 14.9 SEC — HIGH (ref 9.95–12.87)
PROTHROM AB SERPL-ACNC: 15.2 SEC — HIGH (ref 9.95–12.87)
PROTHROM AB SERPL-ACNC: 15.4 SEC — HIGH (ref 9.95–12.87)
PROTHROM AB SERPL-ACNC: 16.7 SEC — HIGH (ref 9.95–12.87)
PROTHROM AB SERPL-ACNC: 16.7 SEC — HIGH (ref 9.95–12.87)
PROTHROM AB SERPL-ACNC: 17.6 SEC — HIGH (ref 9.95–12.87)
PROTHROM AB SERPL-ACNC: 21.1 SEC — HIGH (ref 9.95–12.87)
PROTHROM AB SERPL-ACNC: 22.2 SEC — HIGH (ref 9.95–12.87)
PROTHROM AB SERPL-ACNC: 26.2 SEC — HIGH (ref 9.95–12.87)
PROTHROM AB SERPL-ACNC: 33.9 SEC — HIGH (ref 9.95–12.87)
PROTHROM AB SERPL-ACNC: 36.1 SEC — HIGH (ref 9.95–12.87)
PROTHROM AB SERPL-ACNC: 36.5 SEC — HIGH (ref 9.95–12.87)
PROTHROM AB SERPL-ACNC: 37.5 SEC — HIGH (ref 9.95–12.87)
PROTHROM AB SERPL-ACNC: 39.7 SEC — HIGH (ref 9.95–12.87)
QUALITY CONTROL: YES
RAPID RVP RESULT: DETECTED
RBC # BLD: 4.92 M/UL — SIGNIFICANT CHANGE UP (ref 4.7–6.1)
RBC # BLD: 4.97 M/UL — SIGNIFICANT CHANGE UP (ref 4.7–6.1)
RBC # BLD: 4.99 M/UL — SIGNIFICANT CHANGE UP (ref 4.7–6.1)
RBC # BLD: 5.04 M/UL — SIGNIFICANT CHANGE UP (ref 4.7–6.1)
RBC # BLD: 5.06 M/UL — SIGNIFICANT CHANGE UP (ref 4.7–6.1)
RBC # BLD: 5.07 M/UL — SIGNIFICANT CHANGE UP (ref 4.7–6.1)
RBC # BLD: 5.08 M/UL — SIGNIFICANT CHANGE UP (ref 4.7–6.1)
RBC # BLD: 5.1 M/UL — SIGNIFICANT CHANGE UP (ref 4.7–6.1)
RBC # BLD: 5.16 M/UL — SIGNIFICANT CHANGE UP (ref 4.7–6.1)
RBC # BLD: 5.17 M/UL — SIGNIFICANT CHANGE UP (ref 4.7–6.1)
RBC # BLD: 5.23 M/UL — SIGNIFICANT CHANGE UP (ref 4.7–6.1)
RBC # BLD: 5.23 M/UL — SIGNIFICANT CHANGE UP (ref 4.7–6.1)
RBC # BLD: 5.24 M/UL — SIGNIFICANT CHANGE UP (ref 4.7–6.1)
RBC # BLD: 5.28 M/UL — SIGNIFICANT CHANGE UP (ref 4.7–6.1)
RBC # BLD: 5.29 M/UL — SIGNIFICANT CHANGE UP (ref 4.7–6.1)
RBC # BLD: 5.34 M/UL — SIGNIFICANT CHANGE UP (ref 4.7–6.1)
RBC # BLD: 5.52 M/UL — SIGNIFICANT CHANGE UP (ref 4.7–6.1)
RBC # BLD: 5.55 M/UL — SIGNIFICANT CHANGE UP (ref 4.7–6.1)
RBC # BLD: 5.6 M/UL — SIGNIFICANT CHANGE UP (ref 4.7–6.1)
RBC # BLD: 5.62 M/UL — SIGNIFICANT CHANGE UP (ref 4.7–6.1)
RBC # BLD: 5.89 M/UL — SIGNIFICANT CHANGE UP (ref 4.7–6.1)
RBC # BLD: 5.94 M/UL — SIGNIFICANT CHANGE UP (ref 4.7–6.1)
RBC # FLD: 12.8 % — SIGNIFICANT CHANGE UP (ref 11.5–14.5)
RBC # FLD: 12.9 % — SIGNIFICANT CHANGE UP (ref 11.5–14.5)
RBC # FLD: 13 % — SIGNIFICANT CHANGE UP (ref 11.5–14.5)
RBC # FLD: 13.1 % — SIGNIFICANT CHANGE UP (ref 11.5–14.5)
RBC # FLD: 13.1 % — SIGNIFICANT CHANGE UP (ref 11.5–14.5)
RBC # FLD: 13.2 % — SIGNIFICANT CHANGE UP (ref 11.5–14.5)
RBC # FLD: 13.3 % — SIGNIFICANT CHANGE UP (ref 11.5–14.5)
RBC # FLD: 13.5 % — SIGNIFICANT CHANGE UP (ref 11.5–14.5)
RBC # FLD: 13.6 % — SIGNIFICANT CHANGE UP (ref 11.5–14.5)
RBC # FLD: 13.7 % — SIGNIFICANT CHANGE UP (ref 11.5–14.5)
RBC # FLD: 13.8 % — SIGNIFICANT CHANGE UP (ref 11.5–14.5)
RBC # FLD: 14.3 % — SIGNIFICANT CHANGE UP (ref 11.5–14.5)
S PNEUM AG UR QL: NEGATIVE — SIGNIFICANT CHANGE UP
SAO2 % BLDA: 36 % — CRITICAL LOW (ref 94–98)
SARS-COV-2 IGG SERPL IA-ACNC: 1.1 RATIO — HIGH
SARS-COV-2 IGG SERPL QL IA: ABNORMAL
SARS-COV-2 IGG SERPL QL IA: NEGATIVE — SIGNIFICANT CHANGE UP
SARS-COV-2 IGM SERPL IA-ACNC: 0.36 RATIO — SIGNIFICANT CHANGE UP
SARS-COV-2 RNA SPEC QL NAA+PROBE: DETECTED
SODIUM SERPL-SCNC: 131 MMOL/L — LOW (ref 135–146)
SODIUM SERPL-SCNC: 133 MMOL/L — LOW (ref 135–146)
SODIUM SERPL-SCNC: 133 MMOL/L — LOW (ref 135–146)
SODIUM SERPL-SCNC: 134 MMOL/L — LOW (ref 135–146)
SODIUM SERPL-SCNC: 136 MMOL/L — SIGNIFICANT CHANGE UP (ref 135–146)
SODIUM SERPL-SCNC: 137 MMOL/L — SIGNIFICANT CHANGE UP (ref 135–146)
SODIUM SERPL-SCNC: 137 MMOL/L — SIGNIFICANT CHANGE UP (ref 135–146)
SODIUM SERPL-SCNC: 138 MMOL/L — SIGNIFICANT CHANGE UP (ref 135–146)
SODIUM SERPL-SCNC: 140 MMOL/L — SIGNIFICANT CHANGE UP (ref 135–146)
SODIUM SERPL-SCNC: 141 MMOL/L — SIGNIFICANT CHANGE UP (ref 135–146)
SODIUM SERPL-SCNC: 142 MMOL/L — SIGNIFICANT CHANGE UP (ref 135–146)
SODIUM SERPL-SCNC: 143 MMOL/L — SIGNIFICANT CHANGE UP (ref 135–146)
SODIUM SERPL-SCNC: 143 MMOL/L — SIGNIFICANT CHANGE UP (ref 135–146)
SODIUM SERPL-SCNC: 144 MMOL/L — SIGNIFICANT CHANGE UP (ref 135–146)
SODIUM SERPL-SCNC: 144 MMOL/L — SIGNIFICANT CHANGE UP (ref 135–146)
SPECIMEN SOURCE: SIGNIFICANT CHANGE UP
TROPONIN T SERPL-MCNC: <0.01 NG/ML — SIGNIFICANT CHANGE UP
WBC # BLD: 12.88 K/UL — HIGH (ref 4.8–10.8)
WBC # BLD: 13.65 K/UL — HIGH (ref 4.8–10.8)
WBC # BLD: 16.03 K/UL — HIGH (ref 4.8–10.8)
WBC # BLD: 17.39 K/UL — HIGH (ref 4.8–10.8)
WBC # BLD: 19.57 K/UL — HIGH (ref 4.8–10.8)
WBC # BLD: 19.57 K/UL — HIGH (ref 4.8–10.8)
WBC # BLD: 19.67 K/UL — HIGH (ref 4.8–10.8)
WBC # BLD: 20.11 K/UL — HIGH (ref 4.8–10.8)
WBC # BLD: 20.11 K/UL — HIGH (ref 4.8–10.8)
WBC # BLD: 20.16 K/UL — HIGH (ref 4.8–10.8)
WBC # BLD: 20.46 K/UL — HIGH (ref 4.8–10.8)
WBC # BLD: 20.72 K/UL — HIGH (ref 4.8–10.8)
WBC # BLD: 20.76 K/UL — HIGH (ref 4.8–10.8)
WBC # BLD: 21.04 K/UL — HIGH (ref 4.8–10.8)
WBC # BLD: 21.85 K/UL — HIGH (ref 4.8–10.8)
WBC # BLD: 21.87 K/UL — HIGH (ref 4.8–10.8)
WBC # BLD: 22.29 K/UL — HIGH (ref 4.8–10.8)
WBC # BLD: 5.17 K/UL — SIGNIFICANT CHANGE UP (ref 4.8–10.8)
WBC # BLD: 5.78 K/UL — SIGNIFICANT CHANGE UP (ref 4.8–10.8)
WBC # BLD: 6.37 K/UL — SIGNIFICANT CHANGE UP (ref 4.8–10.8)
WBC # BLD: 7.55 K/UL — SIGNIFICANT CHANGE UP (ref 4.8–10.8)
WBC # BLD: 9.77 K/UL — SIGNIFICANT CHANGE UP (ref 4.8–10.8)
WBC # FLD AUTO: 12.88 K/UL — HIGH (ref 4.8–10.8)
WBC # FLD AUTO: 13.65 K/UL — HIGH (ref 4.8–10.8)
WBC # FLD AUTO: 16.03 K/UL — HIGH (ref 4.8–10.8)
WBC # FLD AUTO: 17.39 K/UL — HIGH (ref 4.8–10.8)
WBC # FLD AUTO: 19.57 K/UL — HIGH (ref 4.8–10.8)
WBC # FLD AUTO: 19.57 K/UL — HIGH (ref 4.8–10.8)
WBC # FLD AUTO: 19.67 K/UL — HIGH (ref 4.8–10.8)
WBC # FLD AUTO: 20.11 K/UL — HIGH (ref 4.8–10.8)
WBC # FLD AUTO: 20.11 K/UL — HIGH (ref 4.8–10.8)
WBC # FLD AUTO: 20.16 K/UL — HIGH (ref 4.8–10.8)
WBC # FLD AUTO: 20.46 K/UL — HIGH (ref 4.8–10.8)
WBC # FLD AUTO: 20.72 K/UL — HIGH (ref 4.8–10.8)
WBC # FLD AUTO: 20.76 K/UL — HIGH (ref 4.8–10.8)
WBC # FLD AUTO: 21.04 K/UL — HIGH (ref 4.8–10.8)
WBC # FLD AUTO: 21.85 K/UL — HIGH (ref 4.8–10.8)
WBC # FLD AUTO: 21.87 K/UL — HIGH (ref 4.8–10.8)
WBC # FLD AUTO: 22.29 K/UL — HIGH (ref 4.8–10.8)
WBC # FLD AUTO: 5.17 K/UL — SIGNIFICANT CHANGE UP (ref 4.8–10.8)
WBC # FLD AUTO: 5.78 K/UL — SIGNIFICANT CHANGE UP (ref 4.8–10.8)
WBC # FLD AUTO: 6.37 K/UL — SIGNIFICANT CHANGE UP (ref 4.8–10.8)
WBC # FLD AUTO: 7.55 K/UL — SIGNIFICANT CHANGE UP (ref 4.8–10.8)
WBC # FLD AUTO: 9.77 K/UL — SIGNIFICANT CHANGE UP (ref 4.8–10.8)

## 2021-01-01 PROCEDURE — 99233 SBSQ HOSP IP/OBS HIGH 50: CPT

## 2021-01-01 PROCEDURE — 71045 X-RAY EXAM CHEST 1 VIEW: CPT | Mod: 26,77

## 2021-01-01 PROCEDURE — 71045 X-RAY EXAM CHEST 1 VIEW: CPT | Mod: 26

## 2021-01-01 PROCEDURE — 99232 SBSQ HOSP IP/OBS MODERATE 35: CPT

## 2021-01-01 PROCEDURE — 99285 EMERGENCY DEPT VISIT HI MDM: CPT

## 2021-01-01 PROCEDURE — 99223 1ST HOSP IP/OBS HIGH 75: CPT

## 2021-01-01 PROCEDURE — 93010 ELECTROCARDIOGRAM REPORT: CPT

## 2021-01-01 PROCEDURE — 99232 SBSQ HOSP IP/OBS MODERATE 35: CPT | Mod: CS

## 2021-01-01 PROCEDURE — 99238 HOSP IP/OBS DSCHRG MGMT 30/<: CPT

## 2021-01-01 RX ORDER — SIMVASTATIN 20 MG/1
20 TABLET, FILM COATED ORAL AT BEDTIME
Refills: 0 | Status: DISCONTINUED | OUTPATIENT
Start: 2021-01-01 | End: 2021-01-01

## 2021-01-01 RX ORDER — INSULIN GLARGINE 100 [IU]/ML
15 INJECTION, SOLUTION SUBCUTANEOUS AT BEDTIME
Refills: 0 | Status: DISCONTINUED | OUTPATIENT
Start: 2021-01-01 | End: 2021-01-01

## 2021-01-01 RX ORDER — SODIUM BICARBONATE 1 MEQ/ML
0.2 SYRINGE (ML) INTRAVENOUS
Qty: 150 | Refills: 0 | Status: DISCONTINUED | OUTPATIENT
Start: 2021-01-01 | End: 2021-01-01

## 2021-01-01 RX ORDER — DEXTROSE 50 % IN WATER 50 %
25 SYRINGE (ML) INTRAVENOUS ONCE
Refills: 0 | Status: DISCONTINUED | OUTPATIENT
Start: 2021-01-01 | End: 2021-01-01

## 2021-01-01 RX ORDER — AMIODARONE HYDROCHLORIDE 400 MG/1
1 TABLET ORAL
Qty: 900 | Refills: 0 | Status: DISCONTINUED | OUTPATIENT
Start: 2021-01-01 | End: 2021-01-01

## 2021-01-01 RX ORDER — REMDESIVIR 5 MG/ML
INJECTION INTRAVENOUS
Refills: 0 | Status: DISCONTINUED | OUTPATIENT
Start: 2021-01-01 | End: 2021-01-01

## 2021-01-01 RX ORDER — DEXAMETHASONE 0.5 MG/5ML
6 ELIXIR ORAL DAILY
Refills: 0 | Status: DISCONTINUED | OUTPATIENT
Start: 2021-01-01 | End: 2021-01-01

## 2021-01-01 RX ORDER — METOPROLOL TARTRATE 50 MG
5 TABLET ORAL EVERY 6 HOURS
Refills: 0 | Status: DISCONTINUED | OUTPATIENT
Start: 2021-01-01 | End: 2021-01-01

## 2021-01-01 RX ORDER — METFORMIN HYDROCHLORIDE 850 MG/1
1 TABLET ORAL
Qty: 0 | Refills: 0 | DISCHARGE

## 2021-01-01 RX ORDER — METOPROLOL TARTRATE 50 MG
50 TABLET ORAL THREE TIMES A DAY
Refills: 0 | Status: DISCONTINUED | OUTPATIENT
Start: 2021-01-01 | End: 2021-01-01

## 2021-01-01 RX ORDER — HEPARIN SODIUM 5000 [USP'U]/ML
1500 INJECTION INTRAVENOUS; SUBCUTANEOUS
Qty: 25000 | Refills: 0 | Status: DISCONTINUED | OUTPATIENT
Start: 2021-01-01 | End: 2021-01-01

## 2021-01-01 RX ORDER — SODIUM CHLORIDE 9 MG/ML
1000 INJECTION, SOLUTION INTRAVENOUS
Refills: 0 | Status: DISCONTINUED | OUTPATIENT
Start: 2021-01-01 | End: 2021-01-01

## 2021-01-01 RX ORDER — INSULIN LISPRO 100/ML
VIAL (ML) SUBCUTANEOUS AT BEDTIME
Refills: 0 | Status: DISCONTINUED | OUTPATIENT
Start: 2021-01-01 | End: 2021-01-01

## 2021-01-01 RX ORDER — FUROSEMIDE 40 MG
40 TABLET ORAL DAILY
Refills: 0 | Status: DISCONTINUED | OUTPATIENT
Start: 2021-01-01 | End: 2021-01-01

## 2021-01-01 RX ORDER — DEXTROSE 50 % IN WATER 50 %
50 SYRINGE (ML) INTRAVENOUS ONCE
Refills: 0 | Status: COMPLETED | OUTPATIENT
Start: 2021-01-01 | End: 2021-01-01

## 2021-01-01 RX ORDER — DILTIAZEM HCL 120 MG
10 CAPSULE, EXT RELEASE 24 HR ORAL ONCE
Refills: 0 | Status: DISCONTINUED | OUTPATIENT
Start: 2021-01-01 | End: 2021-01-01

## 2021-01-01 RX ORDER — TAMSULOSIN HYDROCHLORIDE 0.4 MG/1
1 CAPSULE ORAL
Qty: 0 | Refills: 0 | DISCHARGE

## 2021-01-01 RX ORDER — AMIODARONE HYDROCHLORIDE 400 MG/1
0.5 TABLET ORAL
Qty: 900 | Refills: 0 | Status: DISCONTINUED | OUTPATIENT
Start: 2021-01-01 | End: 2021-01-01

## 2021-01-01 RX ORDER — INSULIN LISPRO 100/ML
12 VIAL (ML) SUBCUTANEOUS
Refills: 0 | Status: DISCONTINUED | OUTPATIENT
Start: 2021-01-01 | End: 2021-01-01

## 2021-01-01 RX ORDER — PROPOFOL 10 MG/ML
15 INJECTION, EMULSION INTRAVENOUS
Qty: 500 | Refills: 0 | Status: DISCONTINUED | OUTPATIENT
Start: 2021-01-01 | End: 2021-01-01

## 2021-01-01 RX ORDER — INSULIN GLARGINE 100 [IU]/ML
30 INJECTION, SOLUTION SUBCUTANEOUS AT BEDTIME
Refills: 0 | Status: DISCONTINUED | OUTPATIENT
Start: 2021-01-01 | End: 2021-01-01

## 2021-01-01 RX ORDER — FUROSEMIDE 40 MG
20 TABLET ORAL ONCE
Refills: 0 | Status: COMPLETED | OUTPATIENT
Start: 2021-01-01 | End: 2021-01-01

## 2021-01-01 RX ORDER — CALCIUM GLUCONATE 100 MG/ML
2 VIAL (ML) INTRAVENOUS ONCE
Refills: 0 | Status: COMPLETED | OUTPATIENT
Start: 2021-01-01 | End: 2021-01-01

## 2021-01-01 RX ORDER — ACETAMINOPHEN 500 MG
650 TABLET ORAL EVERY 6 HOURS
Refills: 0 | Status: DISCONTINUED | OUTPATIENT
Start: 2021-01-01 | End: 2021-01-01

## 2021-01-01 RX ORDER — INSULIN LISPRO 100/ML
12 VIAL (ML) SUBCUTANEOUS ONCE
Refills: 0 | Status: COMPLETED | OUTPATIENT
Start: 2021-01-01 | End: 2021-01-01

## 2021-01-01 RX ORDER — METOPROLOL TARTRATE 50 MG
5 TABLET ORAL ONCE
Refills: 0 | Status: DISCONTINUED | OUTPATIENT
Start: 2021-01-01 | End: 2021-01-01

## 2021-01-01 RX ORDER — ACETAMINOPHEN 500 MG
650 TABLET ORAL EVERY 4 HOURS
Refills: 0 | Status: DISCONTINUED | OUTPATIENT
Start: 2021-01-01 | End: 2021-01-01

## 2021-01-01 RX ORDER — DILTIAZEM HCL 120 MG
60 CAPSULE, EXT RELEASE 24 HR ORAL EVERY 8 HOURS
Refills: 0 | Status: DISCONTINUED | OUTPATIENT
Start: 2021-01-01 | End: 2021-01-01

## 2021-01-01 RX ORDER — AMIODARONE HYDROCHLORIDE 400 MG/1
200 TABLET ORAL EVERY 12 HOURS
Refills: 0 | Status: DISCONTINUED | OUTPATIENT
Start: 2021-01-01 | End: 2021-01-01

## 2021-01-01 RX ORDER — HEPARIN SODIUM 5000 [USP'U]/ML
1200 INJECTION INTRAVENOUS; SUBCUTANEOUS
Qty: 25000 | Refills: 0 | Status: DISCONTINUED | OUTPATIENT
Start: 2021-01-01 | End: 2021-01-01

## 2021-01-01 RX ORDER — WARFARIN SODIUM 2.5 MG/1
1 TABLET ORAL
Qty: 0 | Refills: 0 | DISCHARGE

## 2021-01-01 RX ORDER — TAMSULOSIN HYDROCHLORIDE 0.4 MG/1
0.4 CAPSULE ORAL AT BEDTIME
Refills: 0 | Status: DISCONTINUED | OUTPATIENT
Start: 2021-01-01 | End: 2021-01-01

## 2021-01-01 RX ORDER — DILTIAZEM HCL 120 MG
24 CAPSULE, EXT RELEASE 24 HR ORAL ONCE
Refills: 0 | Status: COMPLETED | OUTPATIENT
Start: 2021-01-01 | End: 2021-01-01

## 2021-01-01 RX ORDER — ALBUTEROL 90 UG/1
2 AEROSOL, METERED ORAL EVERY 4 HOURS
Refills: 0 | Status: DISCONTINUED | OUTPATIENT
Start: 2021-01-01 | End: 2021-01-01

## 2021-01-01 RX ORDER — SODIUM ZIRCONIUM CYCLOSILICATE 10 G/10G
5 POWDER, FOR SUSPENSION ORAL ONCE
Refills: 0 | Status: COMPLETED | OUTPATIENT
Start: 2021-01-01 | End: 2021-01-01

## 2021-01-01 RX ORDER — INFLUENZA VIRUS VACCINE 15; 15; 15; 15 UG/.5ML; UG/.5ML; UG/.5ML; UG/.5ML
0.5 SUSPENSION INTRAMUSCULAR ONCE
Refills: 0 | Status: DISCONTINUED | OUTPATIENT
Start: 2021-01-01 | End: 2021-01-01

## 2021-01-01 RX ORDER — HEPARIN SODIUM 5000 [USP'U]/ML
1300 INJECTION INTRAVENOUS; SUBCUTANEOUS
Qty: 25000 | Refills: 0 | Status: DISCONTINUED | OUTPATIENT
Start: 2021-01-01 | End: 2021-01-01

## 2021-01-01 RX ORDER — FUROSEMIDE 40 MG
1 TABLET ORAL
Qty: 0 | Refills: 0 | DISCHARGE

## 2021-01-01 RX ORDER — INSULIN LISPRO 100/ML
VIAL (ML) SUBCUTANEOUS
Refills: 0 | Status: DISCONTINUED | OUTPATIENT
Start: 2021-01-01 | End: 2021-01-01

## 2021-01-01 RX ORDER — SODIUM CHLORIDE 9 MG/ML
1000 INJECTION, SOLUTION INTRAVENOUS ONCE
Refills: 0 | Status: COMPLETED | OUTPATIENT
Start: 2021-01-01 | End: 2021-01-01

## 2021-01-01 RX ORDER — FENTANYL CITRATE 50 UG/ML
0.5 INJECTION INTRAVENOUS
Qty: 2500 | Refills: 0 | Status: DISCONTINUED | OUTPATIENT
Start: 2021-01-01 | End: 2021-01-01

## 2021-01-01 RX ORDER — DILTIAZEM HCL 120 MG
240 CAPSULE, EXT RELEASE 24 HR ORAL ONCE
Refills: 0 | Status: COMPLETED | OUTPATIENT
Start: 2021-01-01 | End: 2021-01-01

## 2021-01-01 RX ORDER — METOPROLOL TARTRATE 50 MG
50 TABLET ORAL DAILY
Refills: 0 | Status: DISCONTINUED | OUTPATIENT
Start: 2021-01-01 | End: 2021-01-01

## 2021-01-01 RX ORDER — METOPROLOL TARTRATE 50 MG
5 TABLET ORAL EVERY 8 HOURS
Refills: 0 | Status: DISCONTINUED | OUTPATIENT
Start: 2021-01-01 | End: 2021-01-01

## 2021-01-01 RX ORDER — LOSARTAN POTASSIUM 100 MG/1
50 TABLET, FILM COATED ORAL DAILY
Refills: 0 | Status: DISCONTINUED | OUTPATIENT
Start: 2021-01-01 | End: 2021-01-01

## 2021-01-01 RX ORDER — DILTIAZEM HCL 120 MG
10 CAPSULE, EXT RELEASE 24 HR ORAL
Qty: 125 | Refills: 0 | Status: DISCONTINUED | OUTPATIENT
Start: 2021-01-01 | End: 2021-01-01

## 2021-01-01 RX ORDER — LOSARTAN POTASSIUM 100 MG/1
1 TABLET, FILM COATED ORAL
Qty: 0 | Refills: 0 | DISCHARGE

## 2021-01-01 RX ORDER — FAMOTIDINE 10 MG/ML
20 INJECTION INTRAVENOUS DAILY
Refills: 0 | Status: DISCONTINUED | OUTPATIENT
Start: 2021-01-01 | End: 2021-01-01

## 2021-01-01 RX ORDER — DILTIAZEM HCL 120 MG
10 CAPSULE, EXT RELEASE 24 HR ORAL ONCE
Refills: 0 | Status: COMPLETED | OUTPATIENT
Start: 2021-01-01 | End: 2021-01-01

## 2021-01-01 RX ORDER — DEXAMETHASONE 0.5 MG/5ML
6 ELIXIR ORAL DAILY
Refills: 0 | Status: COMPLETED | OUTPATIENT
Start: 2021-01-01 | End: 2021-01-01

## 2021-01-01 RX ORDER — SODIUM ZIRCONIUM CYCLOSILICATE 10 G/10G
10 POWDER, FOR SUSPENSION ORAL DAILY
Refills: 0 | Status: DISCONTINUED | OUTPATIENT
Start: 2021-01-01 | End: 2021-01-01

## 2021-01-01 RX ORDER — DILTIAZEM HCL 120 MG
20 CAPSULE, EXT RELEASE 24 HR ORAL
Qty: 125 | Refills: 0 | Status: DISCONTINUED | OUTPATIENT
Start: 2021-01-01 | End: 2021-01-01

## 2021-01-01 RX ORDER — FUROSEMIDE 40 MG
40 TABLET ORAL ONCE
Refills: 0 | Status: COMPLETED | OUTPATIENT
Start: 2021-01-01 | End: 2021-01-01

## 2021-01-01 RX ORDER — ATENOLOL 25 MG/1
1 TABLET ORAL
Qty: 0 | Refills: 0 | DISCHARGE

## 2021-01-01 RX ORDER — AMIODARONE HYDROCHLORIDE 400 MG/1
150 TABLET ORAL ONCE
Refills: 0 | Status: COMPLETED | OUTPATIENT
Start: 2021-01-01 | End: 2021-01-01

## 2021-01-01 RX ORDER — PHENYLEPHRINE HYDROCHLORIDE 10 MG/ML
0.3 INJECTION INTRAVENOUS
Qty: 160 | Refills: 0 | Status: DISCONTINUED | OUTPATIENT
Start: 2021-01-01 | End: 2021-01-01

## 2021-01-01 RX ORDER — SODIUM CHLORIDE 0.65 %
1 AEROSOL, SPRAY (ML) NASAL
Refills: 0 | Status: DISCONTINUED | OUTPATIENT
Start: 2021-01-01 | End: 2021-01-01

## 2021-01-01 RX ORDER — INSULIN LISPRO 100/ML
6 VIAL (ML) SUBCUTANEOUS ONCE
Refills: 0 | Status: COMPLETED | OUTPATIENT
Start: 2021-01-01 | End: 2021-01-01

## 2021-01-01 RX ORDER — WARFARIN SODIUM 2.5 MG/1
3 TABLET ORAL ONCE
Refills: 0 | Status: DISCONTINUED | OUTPATIENT
Start: 2021-01-01 | End: 2021-01-01

## 2021-01-01 RX ORDER — DILTIAZEM HCL 120 MG
1 CAPSULE, EXT RELEASE 24 HR ORAL
Qty: 0 | Refills: 0 | DISCHARGE

## 2021-01-01 RX ORDER — DOPAMINE HYDROCHLORIDE 40 MG/ML
10 INJECTION, SOLUTION, CONCENTRATE INTRAVENOUS
Qty: 400 | Refills: 0 | Status: DISCONTINUED | OUTPATIENT
Start: 2021-01-01 | End: 2021-01-01

## 2021-01-01 RX ORDER — PREGABALIN 225 MG/1
0 CAPSULE ORAL
Qty: 0 | Refills: 0 | DISCHARGE

## 2021-01-01 RX ORDER — DEXTROSE 50 % IN WATER 50 %
15 SYRINGE (ML) INTRAVENOUS ONCE
Refills: 0 | Status: DISCONTINUED | OUTPATIENT
Start: 2021-01-01 | End: 2021-01-01

## 2021-01-01 RX ORDER — ENOXAPARIN SODIUM 100 MG/ML
100 INJECTION SUBCUTANEOUS EVERY 12 HOURS
Refills: 0 | Status: DISCONTINUED | OUTPATIENT
Start: 2021-01-01 | End: 2021-01-01

## 2021-01-01 RX ORDER — INSULIN LISPRO 100/ML
4 VIAL (ML) SUBCUTANEOUS
Refills: 0 | Status: DISCONTINUED | OUTPATIENT
Start: 2021-01-01 | End: 2021-01-01

## 2021-01-01 RX ORDER — PANTOPRAZOLE SODIUM 20 MG/1
40 TABLET, DELAYED RELEASE ORAL DAILY
Refills: 0 | Status: DISCONTINUED | OUTPATIENT
Start: 2021-01-01 | End: 2021-01-01

## 2021-01-01 RX ORDER — WARFARIN SODIUM 2.5 MG/1
0 TABLET ORAL
Qty: 0 | Refills: 0 | DISCHARGE

## 2021-01-01 RX ORDER — GLUCAGON INJECTION, SOLUTION 0.5 MG/.1ML
1 INJECTION, SOLUTION SUBCUTANEOUS ONCE
Refills: 0 | Status: DISCONTINUED | OUTPATIENT
Start: 2021-01-01 | End: 2021-01-01

## 2021-01-01 RX ORDER — PHYTONADIONE (VIT K1) 5 MG
5 TABLET ORAL ONCE
Refills: 0 | Status: COMPLETED | OUTPATIENT
Start: 2021-01-01 | End: 2021-01-01

## 2021-01-01 RX ORDER — INSULIN LISPRO 100/ML
6 VIAL (ML) SUBCUTANEOUS
Refills: 0 | Status: DISCONTINUED | OUTPATIENT
Start: 2021-01-01 | End: 2021-01-01

## 2021-01-01 RX ORDER — DIGOXIN 250 MCG
0.25 TABLET ORAL ONCE
Refills: 0 | Status: COMPLETED | OUTPATIENT
Start: 2021-01-01 | End: 2021-01-01

## 2021-01-01 RX ORDER — IBUPROFEN 200 MG
600 TABLET ORAL ONCE
Refills: 0 | Status: COMPLETED | OUTPATIENT
Start: 2021-01-01 | End: 2021-01-01

## 2021-01-01 RX ORDER — DIGOXIN 250 MCG
0.25 TABLET ORAL ONCE
Refills: 0 | Status: DISCONTINUED | OUTPATIENT
Start: 2021-01-01 | End: 2021-01-01

## 2021-01-01 RX ORDER — WARFARIN SODIUM 2.5 MG/1
3 TABLET ORAL ONCE
Refills: 0 | Status: COMPLETED | OUTPATIENT
Start: 2021-01-01 | End: 2021-01-01

## 2021-01-01 RX ORDER — INSULIN GLARGINE 100 [IU]/ML
18 INJECTION, SOLUTION SUBCUTANEOUS AT BEDTIME
Refills: 0 | Status: DISCONTINUED | OUTPATIENT
Start: 2021-01-01 | End: 2021-01-01

## 2021-01-01 RX ORDER — REMDESIVIR 5 MG/ML
100 INJECTION INTRAVENOUS EVERY 24 HOURS
Refills: 0 | Status: COMPLETED | OUTPATIENT
Start: 2021-01-01 | End: 2021-01-01

## 2021-01-01 RX ORDER — REMDESIVIR 5 MG/ML
100 INJECTION INTRAVENOUS EVERY 24 HOURS
Refills: 0 | Status: DISCONTINUED | OUTPATIENT
Start: 2021-01-01 | End: 2021-01-01

## 2021-01-01 RX ORDER — METOPROLOL TARTRATE 50 MG
1 TABLET ORAL
Qty: 0 | Refills: 0 | DISCHARGE

## 2021-01-01 RX ORDER — BACITRACIN ZINC 500 UNIT/G
1 OINTMENT IN PACKET (EA) TOPICAL DAILY
Refills: 0 | Status: DISCONTINUED | OUTPATIENT
Start: 2021-01-01 | End: 2021-01-01

## 2021-01-01 RX ORDER — EPINEPHRINE 0.3 MG/.3ML
0.03 INJECTION INTRAMUSCULAR; SUBCUTANEOUS
Qty: 8 | Refills: 0 | Status: DISCONTINUED | OUTPATIENT
Start: 2021-01-01 | End: 2021-01-01

## 2021-01-01 RX ORDER — PANTOPRAZOLE SODIUM 20 MG/1
40 TABLET, DELAYED RELEASE ORAL
Refills: 0 | Status: DISCONTINUED | OUTPATIENT
Start: 2021-01-01 | End: 2021-01-01

## 2021-01-01 RX ORDER — GUAIFENESIN/DEXTROMETHORPHAN 600MG-30MG
10 TABLET, EXTENDED RELEASE 12 HR ORAL EVERY 4 HOURS
Refills: 0 | Status: DISCONTINUED | OUTPATIENT
Start: 2021-01-01 | End: 2021-01-01

## 2021-01-01 RX ORDER — OMEPRAZOLE 10 MG/1
1 CAPSULE, DELAYED RELEASE ORAL
Qty: 0 | Refills: 0 | DISCHARGE

## 2021-01-01 RX ORDER — ACETAMINOPHEN 500 MG
975 TABLET ORAL ONCE
Refills: 0 | Status: COMPLETED | OUTPATIENT
Start: 2021-01-01 | End: 2021-01-01

## 2021-01-01 RX ORDER — SODIUM ZIRCONIUM CYCLOSILICATE 10 G/10G
10 POWDER, FOR SUSPENSION ORAL ONCE
Refills: 0 | Status: COMPLETED | OUTPATIENT
Start: 2021-01-01 | End: 2021-01-01

## 2021-01-01 RX ORDER — HEPARIN SODIUM 5000 [USP'U]/ML
1100 INJECTION INTRAVENOUS; SUBCUTANEOUS
Qty: 25000 | Refills: 0 | Status: DISCONTINUED | OUTPATIENT
Start: 2021-01-01 | End: 2021-01-01

## 2021-01-01 RX ORDER — SODIUM BICARBONATE 1 MEQ/ML
50 SYRINGE (ML) INTRAVENOUS ONCE
Refills: 0 | Status: COMPLETED | OUTPATIENT
Start: 2021-01-01 | End: 2021-01-01

## 2021-01-01 RX ORDER — INSULIN HUMAN 100 [IU]/ML
10 INJECTION, SOLUTION SUBCUTANEOUS ONCE
Refills: 0 | Status: COMPLETED | OUTPATIENT
Start: 2021-01-01 | End: 2021-01-01

## 2021-01-01 RX ORDER — AMIODARONE HYDROCHLORIDE 400 MG/1
0.5 TABLET ORAL
Qty: 900 | Refills: 0 | Status: COMPLETED | OUTPATIENT
Start: 2021-01-01 | End: 2021-01-01

## 2021-01-01 RX ORDER — INSULIN GLARGINE 100 [IU]/ML
12 INJECTION, SOLUTION SUBCUTANEOUS AT BEDTIME
Refills: 0 | Status: DISCONTINUED | OUTPATIENT
Start: 2021-01-01 | End: 2021-01-01

## 2021-01-01 RX ORDER — SODIUM CHLORIDE 9 MG/ML
1000 INJECTION INTRAMUSCULAR; INTRAVENOUS; SUBCUTANEOUS ONCE
Refills: 0 | Status: COMPLETED | OUTPATIENT
Start: 2021-01-01 | End: 2021-01-01

## 2021-01-01 RX ORDER — VASOPRESSIN 20 [USP'U]/ML
0.04 INJECTION INTRAVENOUS
Qty: 50 | Refills: 0 | Status: DISCONTINUED | OUTPATIENT
Start: 2021-01-01 | End: 2021-01-01

## 2021-01-01 RX ORDER — DILTIAZEM HCL 120 MG
240 CAPSULE, EXT RELEASE 24 HR ORAL DAILY
Refills: 0 | Status: DISCONTINUED | OUTPATIENT
Start: 2021-01-01 | End: 2021-01-01

## 2021-01-01 RX ORDER — DILTIAZEM HCL 120 MG
5 CAPSULE, EXT RELEASE 24 HR ORAL
Qty: 125 | Refills: 0 | Status: DISCONTINUED | OUTPATIENT
Start: 2021-01-01 | End: 2021-01-01

## 2021-01-01 RX ORDER — PHYTONADIONE (VIT K1) 5 MG
2.5 TABLET ORAL ONCE
Refills: 0 | Status: COMPLETED | OUTPATIENT
Start: 2021-01-01 | End: 2021-01-01

## 2021-01-01 RX ORDER — ATROPINE SULFATE 0.1 MG/ML
1 SYRINGE (ML) INJECTION ONCE
Refills: 0 | Status: COMPLETED | OUTPATIENT
Start: 2021-01-01 | End: 2021-01-01

## 2021-01-01 RX ORDER — PHYTONADIONE (VIT K1) 5 MG
2.5 TABLET ORAL ONCE
Refills: 0 | Status: DISCONTINUED | OUTPATIENT
Start: 2021-01-01 | End: 2021-01-01

## 2021-01-01 RX ORDER — WARFARIN SODIUM 2.5 MG/1
3 TABLET ORAL AT BEDTIME
Refills: 0 | Status: DISCONTINUED | OUTPATIENT
Start: 2021-01-01 | End: 2021-01-01

## 2021-01-01 RX ORDER — METOPROLOL TARTRATE 50 MG
25 TABLET ORAL
Refills: 0 | Status: DISCONTINUED | OUTPATIENT
Start: 2021-01-01 | End: 2021-01-01

## 2021-01-01 RX ORDER — SIMVASTATIN 20 MG/1
1 TABLET, FILM COATED ORAL
Qty: 0 | Refills: 0 | DISCHARGE

## 2021-01-01 RX ORDER — INSULIN GLARGINE 100 [IU]/ML
15 INJECTION, SOLUTION SUBCUTANEOUS ONCE
Refills: 0 | Status: COMPLETED | OUTPATIENT
Start: 2021-01-01 | End: 2021-01-01

## 2021-01-01 RX ORDER — OMEGA-3 ACID ETHYL ESTERS 1 G
0 CAPSULE ORAL
Qty: 0 | Refills: 0 | DISCHARGE

## 2021-01-01 RX ORDER — CHLORHEXIDINE GLUCONATE 213 G/1000ML
1 SOLUTION TOPICAL
Refills: 0 | Status: DISCONTINUED | OUTPATIENT
Start: 2021-01-01 | End: 2021-01-01

## 2021-01-01 RX ORDER — REMDESIVIR 5 MG/ML
200 INJECTION INTRAVENOUS EVERY 24 HOURS
Refills: 0 | Status: COMPLETED | OUTPATIENT
Start: 2021-01-01 | End: 2021-01-01

## 2021-01-01 RX ORDER — METOPROLOL TARTRATE 50 MG
6 TABLET ORAL EVERY 8 HOURS
Refills: 0 | Status: DISCONTINUED | OUTPATIENT
Start: 2021-01-01 | End: 2021-01-01

## 2021-01-01 RX ORDER — PROPOFOL 10 MG/ML
15 INJECTION, EMULSION INTRAVENOUS
Qty: 1000 | Refills: 0 | Status: DISCONTINUED | OUTPATIENT
Start: 2021-01-01 | End: 2021-01-01

## 2021-01-01 RX ORDER — METOPROLOL TARTRATE 50 MG
5 TABLET ORAL
Refills: 0 | Status: DISCONTINUED | OUTPATIENT
Start: 2021-01-01 | End: 2021-01-01

## 2021-01-01 RX ORDER — LANOLIN ALCOHOL/MO/W.PET/CERES
3 CREAM (GRAM) TOPICAL AT BEDTIME
Refills: 0 | Status: DISCONTINUED | OUTPATIENT
Start: 2021-01-01 | End: 2021-01-01

## 2021-01-01 RX ORDER — MEROPENEM 1 G/30ML
1000 INJECTION INTRAVENOUS EVERY 12 HOURS
Refills: 0 | Status: DISCONTINUED | OUTPATIENT
Start: 2021-01-01 | End: 2021-01-01

## 2021-01-01 RX ORDER — DEXTROSE 50 % IN WATER 50 %
12.5 SYRINGE (ML) INTRAVENOUS ONCE
Refills: 0 | Status: DISCONTINUED | OUTPATIENT
Start: 2021-01-01 | End: 2021-01-01

## 2021-01-01 RX ORDER — SODIUM ZIRCONIUM CYCLOSILICATE 10 G/10G
10 POWDER, FOR SUSPENSION ORAL ONCE
Refills: 0 | Status: DISCONTINUED | OUTPATIENT
Start: 2021-01-01 | End: 2021-01-01

## 2021-01-01 RX ORDER — METOPROLOL TARTRATE 50 MG
50 TABLET ORAL
Refills: 0 | Status: DISCONTINUED | OUTPATIENT
Start: 2021-01-01 | End: 2021-01-01

## 2021-01-01 RX ORDER — INSULIN LISPRO 100/ML
8 VIAL (ML) SUBCUTANEOUS
Refills: 0 | Status: DISCONTINUED | OUTPATIENT
Start: 2021-01-01 | End: 2021-01-01

## 2021-01-01 RX ORDER — METOPROLOL TARTRATE 50 MG
5 TABLET ORAL ONCE
Refills: 0 | Status: COMPLETED | OUTPATIENT
Start: 2021-01-01 | End: 2021-01-01

## 2021-01-01 RX ORDER — ATROPINE SULFATE 0.1 MG/ML
1 SYRINGE (ML) INJECTION ONCE
Refills: 0 | Status: DISCONTINUED | OUTPATIENT
Start: 2021-01-01 | End: 2021-01-01

## 2021-01-01 RX ORDER — NOREPINEPHRINE BITARTRATE/D5W 8 MG/250ML
0.05 PLASTIC BAG, INJECTION (ML) INTRAVENOUS
Qty: 8 | Refills: 0 | Status: DISCONTINUED | OUTPATIENT
Start: 2021-01-01 | End: 2021-01-01

## 2021-01-01 RX ORDER — DEXMEDETOMIDINE HYDROCHLORIDE IN 0.9% SODIUM CHLORIDE 4 UG/ML
0.5 INJECTION INTRAVENOUS
Qty: 200 | Refills: 0 | Status: DISCONTINUED | OUTPATIENT
Start: 2021-01-01 | End: 2021-01-01

## 2021-01-01 RX ADMIN — Medication 6 UNIT(S): at 11:12

## 2021-01-01 RX ADMIN — AMIODARONE HYDROCHLORIDE 200 MILLIGRAM(S): 400 TABLET ORAL at 06:16

## 2021-01-01 RX ADMIN — Medication 40 MILLIGRAM(S): at 04:13

## 2021-01-01 RX ADMIN — Medication 975 MILLIGRAM(S): at 15:32

## 2021-01-01 RX ADMIN — Medication 40 MILLIGRAM(S): at 18:52

## 2021-01-01 RX ADMIN — SODIUM CHLORIDE 50 MILLILITER(S): 9 INJECTION, SOLUTION INTRAVENOUS at 07:00

## 2021-01-01 RX ADMIN — Medication 2: at 13:17

## 2021-01-01 RX ADMIN — Medication 5 MILLIGRAM(S): at 06:07

## 2021-01-01 RX ADMIN — PANTOPRAZOLE SODIUM 40 MILLIGRAM(S): 20 TABLET, DELAYED RELEASE ORAL at 06:33

## 2021-01-01 RX ADMIN — Medication 6: at 11:38

## 2021-01-01 RX ADMIN — EPINEPHRINE 5.4 MICROGRAM(S)/KG/MIN: 0.3 INJECTION INTRAMUSCULAR; SUBCUTANEOUS at 12:39

## 2021-01-01 RX ADMIN — AMIODARONE HYDROCHLORIDE 600 MILLIGRAM(S): 400 TABLET ORAL at 00:14

## 2021-01-01 RX ADMIN — MEROPENEM 100 MILLIGRAM(S): 1 INJECTION INTRAVENOUS at 18:09

## 2021-01-01 RX ADMIN — Medication 200 GRAM(S): at 11:11

## 2021-01-01 RX ADMIN — ALBUTEROL 2 PUFF(S): 90 AEROSOL, METERED ORAL at 03:45

## 2021-01-01 RX ADMIN — Medication 60 MILLIGRAM(S): at 06:33

## 2021-01-01 RX ADMIN — Medication 240 MILLIGRAM(S): at 04:13

## 2021-01-01 RX ADMIN — Medication 12 UNIT(S): at 19:01

## 2021-01-01 RX ADMIN — Medication 6 UNIT(S): at 16:52

## 2021-01-01 RX ADMIN — Medication 10 MILLILITER(S): at 06:03

## 2021-01-01 RX ADMIN — MEROPENEM 100 MILLIGRAM(S): 1 INJECTION INTRAVENOUS at 17:21

## 2021-01-01 RX ADMIN — PANTOPRAZOLE SODIUM 40 MILLIGRAM(S): 20 TABLET, DELAYED RELEASE ORAL at 06:08

## 2021-01-01 RX ADMIN — Medication 3: at 12:10

## 2021-01-01 RX ADMIN — Medication 6 MILLIGRAM(S): at 10:10

## 2021-01-01 RX ADMIN — Medication 6 MILLIGRAM(S): at 04:52

## 2021-01-01 RX ADMIN — TAMSULOSIN HYDROCHLORIDE 0.4 MILLIGRAM(S): 0.4 CAPSULE ORAL at 23:01

## 2021-01-01 RX ADMIN — Medication 50 MILLIGRAM(S): at 06:16

## 2021-01-01 RX ADMIN — Medication 10 MILLIGRAM(S): at 13:13

## 2021-01-01 RX ADMIN — Medication 5 MILLIGRAM(S): at 00:27

## 2021-01-01 RX ADMIN — INSULIN GLARGINE 30 UNIT(S): 100 INJECTION, SOLUTION SUBCUTANEOUS at 21:48

## 2021-01-01 RX ADMIN — Medication 12 UNIT(S): at 08:00

## 2021-01-01 RX ADMIN — INSULIN GLARGINE 30 UNIT(S): 100 INJECTION, SOLUTION SUBCUTANEOUS at 21:25

## 2021-01-01 RX ADMIN — AMIODARONE HYDROCHLORIDE 200 MILLIGRAM(S): 400 TABLET ORAL at 05:42

## 2021-01-01 RX ADMIN — SIMVASTATIN 20 MILLIGRAM(S): 20 TABLET, FILM COATED ORAL at 21:32

## 2021-01-01 RX ADMIN — Medication 1 MILLIGRAM(S): at 12:38

## 2021-01-01 RX ADMIN — Medication 240 MILLIGRAM(S): at 06:07

## 2021-01-01 RX ADMIN — Medication 2: at 08:06

## 2021-01-01 RX ADMIN — PANTOPRAZOLE SODIUM 40 MILLIGRAM(S): 20 TABLET, DELAYED RELEASE ORAL at 05:45

## 2021-01-01 RX ADMIN — Medication 4: at 11:26

## 2021-01-01 RX ADMIN — HEPARIN SODIUM 15 UNIT(S)/HR: 5000 INJECTION INTRAVENOUS; SUBCUTANEOUS at 18:01

## 2021-01-01 RX ADMIN — PANTOPRAZOLE SODIUM 40 MILLIGRAM(S): 20 TABLET, DELAYED RELEASE ORAL at 06:07

## 2021-01-01 RX ADMIN — Medication 5 MILLIGRAM(S): at 05:33

## 2021-01-01 RX ADMIN — Medication 1: at 21:21

## 2021-01-01 RX ADMIN — Medication 12 UNIT(S): at 12:48

## 2021-01-01 RX ADMIN — Medication 50 MILLIGRAM(S): at 05:43

## 2021-01-01 RX ADMIN — Medication 6 MILLIGRAM(S): at 06:07

## 2021-01-01 RX ADMIN — Medication 12 UNIT(S): at 09:06

## 2021-01-01 RX ADMIN — TAMSULOSIN HYDROCHLORIDE 0.4 MILLIGRAM(S): 0.4 CAPSULE ORAL at 21:42

## 2021-01-01 RX ADMIN — ENOXAPARIN SODIUM 100 MILLIGRAM(S): 100 INJECTION SUBCUTANEOUS at 06:37

## 2021-01-01 RX ADMIN — Medication 12 UNIT(S): at 11:26

## 2021-01-01 RX ADMIN — Medication 4: at 06:55

## 2021-01-01 RX ADMIN — Medication 650 MILLIGRAM(S): at 01:02

## 2021-01-01 RX ADMIN — AMIODARONE HYDROCHLORIDE 16.7 MG/MIN: 400 TABLET ORAL at 05:08

## 2021-01-01 RX ADMIN — Medication 4: at 06:04

## 2021-01-01 RX ADMIN — MEROPENEM 100 MILLIGRAM(S): 1 INJECTION INTRAVENOUS at 17:28

## 2021-01-01 RX ADMIN — HEPARIN SODIUM 11 UNIT(S)/HR: 5000 INJECTION INTRAVENOUS; SUBCUTANEOUS at 08:47

## 2021-01-01 RX ADMIN — SODIUM ZIRCONIUM CYCLOSILICATE 10 GRAM(S): 10 POWDER, FOR SUSPENSION ORAL at 10:25

## 2021-01-01 RX ADMIN — SODIUM CHLORIDE 50 MILLILITER(S): 9 INJECTION, SOLUTION INTRAVENOUS at 05:35

## 2021-01-01 RX ADMIN — LOSARTAN POTASSIUM 50 MILLIGRAM(S): 100 TABLET, FILM COATED ORAL at 04:13

## 2021-01-01 RX ADMIN — Medication 6 MILLIGRAM(S): at 06:37

## 2021-01-01 RX ADMIN — Medication 8: at 09:59

## 2021-01-01 RX ADMIN — Medication 5 MILLIGRAM(S): at 21:03

## 2021-01-01 RX ADMIN — AMIODARONE HYDROCHLORIDE 200 MILLIGRAM(S): 400 TABLET ORAL at 06:33

## 2021-01-01 RX ADMIN — Medication 5 MILLIGRAM(S): at 20:20

## 2021-01-01 RX ADMIN — LOSARTAN POTASSIUM 50 MILLIGRAM(S): 100 TABLET, FILM COATED ORAL at 06:08

## 2021-01-01 RX ADMIN — Medication 650 MILLIGRAM(S): at 21:41

## 2021-01-01 RX ADMIN — Medication 2: at 07:48

## 2021-01-01 RX ADMIN — Medication 20 MILLIGRAM(S): at 11:11

## 2021-01-01 RX ADMIN — HEPARIN SODIUM 11 UNIT(S)/HR: 5000 INJECTION INTRAVENOUS; SUBCUTANEOUS at 05:34

## 2021-01-01 RX ADMIN — Medication 1: at 21:13

## 2021-01-01 RX ADMIN — Medication 6 MILLIGRAM(S): at 04:12

## 2021-01-01 RX ADMIN — WARFARIN SODIUM 3 MILLIGRAM(S): 2.5 TABLET ORAL at 21:42

## 2021-01-01 RX ADMIN — Medication 2: at 15:52

## 2021-01-01 RX ADMIN — Medication 0.25 MILLIGRAM(S): at 10:26

## 2021-01-01 RX ADMIN — Medication 8 UNIT(S): at 11:53

## 2021-01-01 RX ADMIN — Medication 10: at 16:52

## 2021-01-01 RX ADMIN — SODIUM ZIRCONIUM CYCLOSILICATE 5 GRAM(S): 10 POWDER, FOR SUSPENSION ORAL at 19:33

## 2021-01-01 RX ADMIN — MEROPENEM 100 MILLIGRAM(S): 1 INJECTION INTRAVENOUS at 17:30

## 2021-01-01 RX ADMIN — TAMSULOSIN HYDROCHLORIDE 0.4 MILLIGRAM(S): 0.4 CAPSULE ORAL at 21:32

## 2021-01-01 RX ADMIN — Medication 40 MILLIGRAM(S): at 05:51

## 2021-01-01 RX ADMIN — Medication 4: at 06:36

## 2021-01-01 RX ADMIN — AMIODARONE HYDROCHLORIDE 33.3 MG/MIN: 400 TABLET ORAL at 00:14

## 2021-01-01 RX ADMIN — Medication 5 MILLIGRAM(S): at 22:40

## 2021-01-01 RX ADMIN — MEROPENEM 100 MILLIGRAM(S): 1 INJECTION INTRAVENOUS at 06:08

## 2021-01-01 RX ADMIN — Medication 4: at 08:25

## 2021-01-01 RX ADMIN — Medication 50 MILLIGRAM(S): at 04:44

## 2021-01-01 RX ADMIN — Medication 8 UNIT(S): at 06:56

## 2021-01-01 RX ADMIN — Medication 40 MILLIGRAM(S): at 04:43

## 2021-01-01 RX ADMIN — Medication 6 MILLIGRAM(S): at 06:35

## 2021-01-01 RX ADMIN — Medication 4: at 08:01

## 2021-01-01 RX ADMIN — AMIODARONE HYDROCHLORIDE 16.7 MG/MIN: 400 TABLET ORAL at 17:56

## 2021-01-01 RX ADMIN — TAMSULOSIN HYDROCHLORIDE 0.4 MILLIGRAM(S): 0.4 CAPSULE ORAL at 20:57

## 2021-01-01 RX ADMIN — SODIUM ZIRCONIUM CYCLOSILICATE 5 GRAM(S): 10 POWDER, FOR SUSPENSION ORAL at 11:14

## 2021-01-01 RX ADMIN — DOPAMINE HYDROCHLORIDE 36 MICROGRAM(S)/KG/MIN: 40 INJECTION, SOLUTION, CONCENTRATE INTRAVENOUS at 12:40

## 2021-01-01 RX ADMIN — MEROPENEM 100 MILLIGRAM(S): 1 INJECTION INTRAVENOUS at 05:34

## 2021-01-01 RX ADMIN — AMIODARONE HYDROCHLORIDE 16.7 MG/MIN: 400 TABLET ORAL at 01:18

## 2021-01-01 RX ADMIN — REMDESIVIR 200 MILLIGRAM(S): 5 INJECTION INTRAVENOUS at 13:35

## 2021-01-01 RX ADMIN — AMIODARONE HYDROCHLORIDE 200 MILLIGRAM(S): 400 TABLET ORAL at 19:35

## 2021-01-01 RX ADMIN — SIMVASTATIN 20 MILLIGRAM(S): 20 TABLET, FILM COATED ORAL at 21:03

## 2021-01-01 RX ADMIN — Medication 2: at 11:58

## 2021-01-01 RX ADMIN — Medication 2: at 17:46

## 2021-01-01 RX ADMIN — REMDESIVIR 500 MILLIGRAM(S): 5 INJECTION INTRAVENOUS at 16:28

## 2021-01-01 RX ADMIN — SODIUM CHLORIDE 1000 MILLILITER(S): 9 INJECTION, SOLUTION INTRAVENOUS at 12:37

## 2021-01-01 RX ADMIN — TAMSULOSIN HYDROCHLORIDE 0.4 MILLIGRAM(S): 0.4 CAPSULE ORAL at 20:41

## 2021-01-01 RX ADMIN — Medication 25 MILLIGRAM(S): at 09:16

## 2021-01-01 RX ADMIN — Medication 40 MILLIGRAM(S): at 06:07

## 2021-01-01 RX ADMIN — Medication 50 MILLIGRAM(S): at 06:08

## 2021-01-01 RX ADMIN — HEPARIN SODIUM 11 UNIT(S)/HR: 5000 INJECTION INTRAVENOUS; SUBCUTANEOUS at 05:07

## 2021-01-01 RX ADMIN — Medication 10 MILLIGRAM(S): at 21:18

## 2021-01-01 RX ADMIN — SIMVASTATIN 20 MILLIGRAM(S): 20 TABLET, FILM COATED ORAL at 23:01

## 2021-01-01 RX ADMIN — Medication 8: at 12:47

## 2021-01-01 RX ADMIN — PANTOPRAZOLE SODIUM 40 MILLIGRAM(S): 20 TABLET, DELAYED RELEASE ORAL at 05:42

## 2021-01-01 RX ADMIN — Medication 40 MILLIGRAM(S): at 07:22

## 2021-01-01 RX ADMIN — INSULIN GLARGINE 15 UNIT(S): 100 INJECTION, SOLUTION SUBCUTANEOUS at 21:03

## 2021-01-01 RX ADMIN — SIMVASTATIN 20 MILLIGRAM(S): 20 TABLET, FILM COATED ORAL at 21:42

## 2021-01-01 RX ADMIN — INSULIN GLARGINE 18 UNIT(S): 100 INJECTION, SOLUTION SUBCUTANEOUS at 20:57

## 2021-01-01 RX ADMIN — Medication 4: at 17:45

## 2021-01-01 RX ADMIN — PANTOPRAZOLE SODIUM 40 MILLIGRAM(S): 20 TABLET, DELAYED RELEASE ORAL at 07:22

## 2021-01-01 RX ADMIN — AMIODARONE HYDROCHLORIDE 200 MILLIGRAM(S): 400 TABLET ORAL at 17:43

## 2021-01-01 RX ADMIN — Medication 2: at 17:05

## 2021-01-01 RX ADMIN — TAMSULOSIN HYDROCHLORIDE 0.4 MILLIGRAM(S): 0.4 CAPSULE ORAL at 20:20

## 2021-01-01 RX ADMIN — Medication 6 UNIT(S): at 07:43

## 2021-01-01 RX ADMIN — Medication 8: at 17:21

## 2021-01-01 RX ADMIN — TAMSULOSIN HYDROCHLORIDE 0.4 MILLIGRAM(S): 0.4 CAPSULE ORAL at 21:20

## 2021-01-01 RX ADMIN — Medication 12 UNIT(S): at 06:35

## 2021-01-01 RX ADMIN — REMDESIVIR 500 MILLIGRAM(S): 5 INJECTION INTRAVENOUS at 11:11

## 2021-01-01 RX ADMIN — REMDESIVIR 500 MILLIGRAM(S): 5 INJECTION INTRAVENOUS at 17:05

## 2021-01-01 RX ADMIN — Medication 50 MILLIGRAM(S): at 17:43

## 2021-01-01 RX ADMIN — Medication 1: at 21:12

## 2021-01-01 RX ADMIN — INSULIN GLARGINE 30 UNIT(S): 100 INJECTION, SOLUTION SUBCUTANEOUS at 21:10

## 2021-01-01 RX ADMIN — Medication 8 UNIT(S): at 17:43

## 2021-01-01 RX ADMIN — SIMVASTATIN 20 MILLIGRAM(S): 20 TABLET, FILM COATED ORAL at 20:41

## 2021-01-01 RX ADMIN — PANTOPRAZOLE SODIUM 40 MILLIGRAM(S): 20 TABLET, DELAYED RELEASE ORAL at 06:16

## 2021-01-01 RX ADMIN — FAMOTIDINE 20 MILLIGRAM(S): 10 INJECTION INTRAVENOUS at 11:12

## 2021-01-01 RX ADMIN — Medication 50 MILLIGRAM(S): at 04:13

## 2021-01-01 RX ADMIN — Medication 8 UNIT(S): at 10:50

## 2021-01-01 RX ADMIN — Medication 40 MILLIGRAM(S): at 18:29

## 2021-01-01 RX ADMIN — AMIODARONE HYDROCHLORIDE 16.7 MG/MIN: 400 TABLET ORAL at 08:33

## 2021-01-01 RX ADMIN — Medication 60 MILLIGRAM(S): at 12:00

## 2021-01-01 RX ADMIN — INSULIN GLARGINE 30 UNIT(S): 100 INJECTION, SOLUTION SUBCUTANEOUS at 21:12

## 2021-01-01 RX ADMIN — Medication 50 MILLILITER(S): at 16:25

## 2021-01-01 RX ADMIN — Medication 5 MILLIGRAM(S): at 05:10

## 2021-01-01 RX ADMIN — Medication 8: at 11:38

## 2021-01-01 RX ADMIN — PANTOPRAZOLE SODIUM 40 MILLIGRAM(S): 20 TABLET, DELAYED RELEASE ORAL at 08:51

## 2021-01-01 RX ADMIN — Medication 5 MILLIGRAM(S): at 13:26

## 2021-01-01 RX ADMIN — ENOXAPARIN SODIUM 100 MILLIGRAM(S): 100 INJECTION SUBCUTANEOUS at 20:49

## 2021-01-01 RX ADMIN — Medication 4 UNIT(S): at 17:33

## 2021-01-01 RX ADMIN — Medication 2: at 11:37

## 2021-01-01 RX ADMIN — Medication 5 MILLIGRAM(S): at 00:13

## 2021-01-01 RX ADMIN — Medication 5 MILLIGRAM(S): at 04:48

## 2021-01-01 RX ADMIN — INSULIN GLARGINE 18 UNIT(S): 100 INJECTION, SOLUTION SUBCUTANEOUS at 21:32

## 2021-01-01 RX ADMIN — REMDESIVIR 500 MILLIGRAM(S): 5 INJECTION INTRAVENOUS at 16:52

## 2021-01-01 RX ADMIN — Medication 6 MILLIGRAM(S): at 05:33

## 2021-01-01 RX ADMIN — Medication 12 UNIT(S): at 07:47

## 2021-01-01 RX ADMIN — Medication 5 MILLIGRAM(S): at 23:48

## 2021-01-01 RX ADMIN — INSULIN GLARGINE 18 UNIT(S): 100 INJECTION, SOLUTION SUBCUTANEOUS at 21:13

## 2021-01-01 RX ADMIN — Medication 12 UNIT(S): at 11:56

## 2021-01-01 RX ADMIN — Medication 5 MILLIGRAM(S): at 11:19

## 2021-01-01 RX ADMIN — Medication 10 MG/HR: at 07:00

## 2021-01-01 RX ADMIN — Medication 6: at 16:51

## 2021-01-01 RX ADMIN — Medication 9 MICROGRAM(S)/KG/MIN: at 12:39

## 2021-01-01 RX ADMIN — Medication 600 MILLIGRAM(S): at 17:58

## 2021-01-01 RX ADMIN — Medication 4: at 17:33

## 2021-01-01 RX ADMIN — Medication 8 UNIT(S): at 08:25

## 2021-01-01 RX ADMIN — Medication 6 MILLIGRAM(S): at 04:43

## 2021-01-01 RX ADMIN — Medication 10 MILLIGRAM(S): at 13:06

## 2021-01-01 RX ADMIN — MEROPENEM 100 MILLIGRAM(S): 1 INJECTION INTRAVENOUS at 05:10

## 2021-01-01 RX ADMIN — Medication 4: at 17:17

## 2021-01-01 RX ADMIN — INSULIN HUMAN 10 UNIT(S): 100 INJECTION, SOLUTION SUBCUTANEOUS at 15:51

## 2021-01-01 RX ADMIN — Medication 12 UNIT(S): at 08:09

## 2021-01-01 RX ADMIN — Medication 6: at 11:12

## 2021-01-01 RX ADMIN — Medication 8 UNIT(S): at 08:13

## 2021-01-01 RX ADMIN — Medication 12 UNIT(S): at 17:20

## 2021-01-01 RX ADMIN — Medication 6: at 11:57

## 2021-01-01 RX ADMIN — Medication 101 MILLIGRAM(S): at 20:49

## 2021-01-01 RX ADMIN — Medication 50 MILLIEQUIVALENT(S): at 12:37

## 2021-01-01 RX ADMIN — Medication 10 MG/HR: at 05:32

## 2021-01-01 RX ADMIN — INSULIN GLARGINE 18 UNIT(S): 100 INJECTION, SOLUTION SUBCUTANEOUS at 21:34

## 2021-01-01 RX ADMIN — Medication 50 MILLILITER(S): at 11:11

## 2021-01-01 RX ADMIN — Medication 6 MILLIGRAM(S): at 05:00

## 2021-01-01 RX ADMIN — INSULIN GLARGINE 30 UNIT(S): 100 INJECTION, SOLUTION SUBCUTANEOUS at 21:20

## 2021-01-01 RX ADMIN — Medication 12 UNIT(S): at 08:06

## 2021-01-01 RX ADMIN — Medication 5 MILLIGRAM(S): at 05:51

## 2021-01-01 RX ADMIN — Medication 5 MILLIGRAM(S): at 13:05

## 2021-01-01 RX ADMIN — HEPARIN SODIUM 11 UNIT(S)/HR: 5000 INJECTION INTRAVENOUS; SUBCUTANEOUS at 23:00

## 2021-01-01 RX ADMIN — INSULIN GLARGINE 15 UNIT(S): 100 INJECTION, SOLUTION SUBCUTANEOUS at 21:12

## 2021-01-01 RX ADMIN — Medication 50 MILLIGRAM(S): at 09:40

## 2021-01-01 RX ADMIN — MEROPENEM 100 MILLIGRAM(S): 1 INJECTION INTRAVENOUS at 04:48

## 2021-01-01 RX ADMIN — Medication 8 UNIT(S): at 11:38

## 2021-01-01 RX ADMIN — WARFARIN SODIUM 3 MILLIGRAM(S): 2.5 TABLET ORAL at 22:41

## 2021-01-01 RX ADMIN — Medication 8 UNIT(S): at 16:51

## 2021-01-01 RX ADMIN — Medication 5 MILLIGRAM(S): at 11:28

## 2021-01-01 RX ADMIN — FAMOTIDINE 20 MILLIGRAM(S): 10 INJECTION INTRAVENOUS at 12:59

## 2021-01-01 RX ADMIN — Medication 2: at 08:14

## 2021-01-01 RX ADMIN — Medication 5 MILLIGRAM(S): at 06:27

## 2021-01-01 RX ADMIN — Medication 4: at 07:43

## 2021-01-01 RX ADMIN — Medication 240 MILLIGRAM(S): at 09:09

## 2021-01-01 RX ADMIN — Medication 50 MILLIGRAM(S): at 19:35

## 2021-01-01 RX ADMIN — INSULIN HUMAN 10 UNIT(S): 100 INJECTION, SOLUTION SUBCUTANEOUS at 11:58

## 2021-01-01 RX ADMIN — AMIODARONE HYDROCHLORIDE 16.7 MG/MIN: 400 TABLET ORAL at 06:40

## 2021-01-01 RX ADMIN — SODIUM CHLORIDE 50 MILLILITER(S): 9 INJECTION, SOLUTION INTRAVENOUS at 12:10

## 2021-01-01 RX ADMIN — Medication 5 MG/HR: at 04:46

## 2021-01-01 RX ADMIN — Medication 6: at 19:00

## 2021-01-01 RX ADMIN — Medication 40 MILLIGRAM(S): at 08:23

## 2021-01-01 RX ADMIN — Medication 8 UNIT(S): at 17:04

## 2021-01-01 RX ADMIN — Medication 10 MG/HR: at 08:00

## 2021-01-01 RX ADMIN — Medication 5 MG/HR: at 15:50

## 2021-01-01 RX ADMIN — Medication 6: at 17:49

## 2021-01-01 RX ADMIN — Medication 1: at 23:24

## 2021-01-01 RX ADMIN — Medication 6: at 06:13

## 2021-01-01 RX ADMIN — HEPARIN SODIUM 11 UNIT(S)/HR: 5000 INJECTION INTRAVENOUS; SUBCUTANEOUS at 07:00

## 2021-01-01 RX ADMIN — INSULIN GLARGINE 18 UNIT(S): 100 INJECTION, SOLUTION SUBCUTANEOUS at 23:01

## 2021-01-01 RX ADMIN — INSULIN GLARGINE 30 UNIT(S): 100 INJECTION, SOLUTION SUBCUTANEOUS at 21:02

## 2021-01-01 RX ADMIN — MEROPENEM 100 MILLIGRAM(S): 1 INJECTION INTRAVENOUS at 17:43

## 2021-01-01 RX ADMIN — Medication 12 UNIT(S): at 12:26

## 2021-01-01 RX ADMIN — Medication 5 MILLIGRAM(S): at 23:01

## 2021-01-01 RX ADMIN — Medication 8 UNIT(S): at 17:08

## 2021-01-01 RX ADMIN — Medication 6 MILLIGRAM(S): at 04:47

## 2021-01-01 RX ADMIN — Medication 100.5 MILLIGRAM(S): at 22:38

## 2021-01-01 RX ADMIN — Medication 10: at 12:40

## 2021-01-01 RX ADMIN — Medication 6: at 10:50

## 2021-01-01 RX ADMIN — PANTOPRAZOLE SODIUM 40 MILLIGRAM(S): 20 TABLET, DELAYED RELEASE ORAL at 05:51

## 2021-01-01 RX ADMIN — TAMSULOSIN HYDROCHLORIDE 0.4 MILLIGRAM(S): 0.4 CAPSULE ORAL at 22:41

## 2021-01-01 RX ADMIN — Medication 2: at 11:54

## 2021-01-01 RX ADMIN — Medication 60 MILLIGRAM(S): at 16:01

## 2021-01-01 RX ADMIN — PANTOPRAZOLE SODIUM 40 MILLIGRAM(S): 20 TABLET, DELAYED RELEASE ORAL at 04:14

## 2021-01-01 RX ADMIN — Medication 60 MILLIGRAM(S): at 21:19

## 2021-01-01 RX ADMIN — Medication 5 MILLIGRAM(S): at 17:18

## 2021-01-01 RX ADMIN — Medication 12 UNIT(S): at 11:42

## 2021-01-01 RX ADMIN — Medication 4: at 12:26

## 2021-01-01 RX ADMIN — AMIODARONE HYDROCHLORIDE 200 MILLIGRAM(S): 400 TABLET ORAL at 19:00

## 2021-01-01 RX ADMIN — TAMSULOSIN HYDROCHLORIDE 0.4 MILLIGRAM(S): 0.4 CAPSULE ORAL at 21:03

## 2021-01-01 RX ADMIN — Medication 24 MILLIGRAM(S): at 17:04

## 2021-01-01 RX ADMIN — SIMVASTATIN 20 MILLIGRAM(S): 20 TABLET, FILM COATED ORAL at 20:57

## 2021-01-01 RX ADMIN — SODIUM CHLORIDE 1000 MILLILITER(S): 9 INJECTION INTRAMUSCULAR; INTRAVENOUS; SUBCUTANEOUS at 17:58

## 2021-01-01 RX ADMIN — SIMVASTATIN 20 MILLIGRAM(S): 20 TABLET, FILM COATED ORAL at 21:20

## 2021-01-01 RX ADMIN — Medication 2: at 08:20

## 2021-01-01 RX ADMIN — Medication 240 MILLIGRAM(S): at 04:43

## 2021-01-01 RX ADMIN — SIMVASTATIN 20 MILLIGRAM(S): 20 TABLET, FILM COATED ORAL at 20:20

## 2021-01-01 RX ADMIN — Medication 40 MILLIGRAM(S): at 13:00

## 2021-01-01 RX ADMIN — SIMVASTATIN 20 MILLIGRAM(S): 20 TABLET, FILM COATED ORAL at 22:41

## 2021-01-01 RX ADMIN — Medication 4: at 17:42

## 2021-01-01 RX ADMIN — Medication 5 MILLIGRAM(S): at 17:30

## 2021-01-01 RX ADMIN — Medication 6 MILLIGRAM(S): at 05:51

## 2021-01-01 RX ADMIN — Medication 1: at 23:21

## 2021-01-01 RX ADMIN — SIMVASTATIN 20 MILLIGRAM(S): 20 TABLET, FILM COATED ORAL at 21:48

## 2021-01-01 RX ADMIN — Medication 50 MILLIGRAM(S): at 06:33

## 2021-01-01 RX ADMIN — Medication 5 MILLIGRAM(S): at 11:06

## 2021-01-01 RX ADMIN — Medication 50 MILLIGRAM(S): at 19:01

## 2021-01-01 RX ADMIN — WARFARIN SODIUM 3 MILLIGRAM(S): 2.5 TABLET ORAL at 21:32

## 2021-01-01 RX ADMIN — TAMSULOSIN HYDROCHLORIDE 0.4 MILLIGRAM(S): 0.4 CAPSULE ORAL at 21:48

## 2021-01-01 RX ADMIN — Medication 5 MILLIGRAM(S): at 21:34

## 2021-01-01 RX ADMIN — MEROPENEM 100 MILLIGRAM(S): 1 INJECTION INTRAVENOUS at 05:51

## 2021-02-12 NOTE — H&P ADULT - NSHPPHYSICALEXAM_GEN_ALL_CORE
VITALS: Vital Signs Last 24 Hrs  T(C): 38.3 (12 Feb 2021 17:20), Max: 38.3 (12 Feb 2021 17:20)  T(F): 101 (12 Feb 2021 17:20), Max: 101 (12 Feb 2021 17:20)  HR: 118 (12 Feb 2021 14:26) (118 - 118)  BP: 137/90 (12 Feb 2021 14:26) (137/90 - 137/90)  RR: 20 (12 Feb 2021 14:26) (20 - 20)  SpO2: 94% (12 Feb 2021 14:26) (94% - 94%)    GENERAL: NAD, sitting on a chair next to her wife who is also being admitted. NO SUPPLEMENTAL OXYGEN   HEAD:  Atraumatic, Normocephalic  EYES: Conjunctiva and sclera clear  ENT: Moist mucous membranes  NECK: Supple  CHEST/LUNG: Clear to auscultation bilaterally. Unlabored respirations  HEART: Regular rate and rhythm  ABDOMEN: Soft, Nontender, Nondistended.  EXTREMITIES:  No edema  NERVOUS SYSTEM:  Alert & Oriented X3, speech clear.  MSK: FROM all 4 extremities, full and equal strength  SKIN: No rashes or lesions VITALS: Vital Signs Last 24 Hrs  T(C): 38.3 (12 Feb 2021 17:20), Max: 38.3 (12 Feb 2021 17:20)  T(F): 101 (12 Feb 2021 17:20), Max: 101 (12 Feb 2021 17:20)  HR: 118 (12 Feb 2021 14:26) (118 - 118)  BP: 137/90 (12 Feb 2021 14:26) (137/90 - 137/90)  RR: 20 (12 Feb 2021 14:26) (20 - 20)  SpO2: 94% (12 Feb 2021 14:26) (94% - 94%)    GENERAL: NAD, sitting on a chair next to her wife who is also being admitted. NO SUPPLEMENTAL OXYGEN   HEAD:  Atraumatic, Normocephalic  EYES: Conjunctiva and sclera clear  ENT: Moist mucous membranes  NECK: Supple  CHEST/LUNG: Clear to auscultation bilaterally. Unlabored respirations  HEART: Regular rate and rhythm  ABDOMEN: Soft, Nontender, obese abd  EXTREMITIES:  No edema  NERVOUS SYSTEM:  Alert & Oriented X3, speech clear.  MSK: FROM all 4 extremities, full and equal strength  SKIN: No rashes or lesions

## 2021-02-12 NOTE — ED PROVIDER NOTE - PHYSICAL EXAMINATION
GENERAL: Well-nourished, Well-developed. NAD.   HEAD: Normocephalic, atraumatic  Eyes: PERRLA, EOMI. No asymmetry. No nystagmus. No conjunctival injection. Non-icteric sclera.  ENMT: MMM.   Neck: Supple. FROM  CVS: (+)tachycardia. Normal S1,S2. No murmurs appreciated on auscultation   RESP: (+)tachypnea and desaturation to 88% when ambulating. No use of accessory muscles. Chest rise symmetrical with good expansion. Lungs clear to auscultation B/L. No wheezing, rales, or rhonchi auscultated.  GI: Normal auscultation of bowel sounds in all 4 quadrants. Soft, Nontender, Nondistended. No guarding or rebound tenderness.   Skin: Warm, Dry. No rashes or lesions. Good cap refill < 2 sec B/L.  EXT: Radial and pedal pulses present B/L. No calf tenderness or swelling B/L. No palpable cords. No pedal edema B/L.  Neuro: AA&O x 3. Sensation grossly intact. Strength 5/5 B/L. Gait within normal limits.

## 2021-02-12 NOTE — ED PROVIDER NOTE - CLINICAL SUMMARY MEDICAL DECISION MAKING FREE TEXT BOX
patient + for covid with decreasing saturation with exertion with mild exertion I will admti for further management at this time

## 2021-02-12 NOTE — H&P ADULT - NSHPLABSRESULTS_GEN_ALL_CORE
16.1   5.78  )-----------( 220      ( 12 Feb 2021 14:44 )             47.8       02-12    131<L>  |  93<L>  |  17  ----------------------------<  205<H>  4.1   |  25  |  1.3    Ca    8.7      12 Feb 2021 14:44  Mg     1.8     02-12    TPro  6.8  /  Alb  3.9  /  TBili  0.5  /  DBili  x   /  AST  45<H>  /  ALT  45<H>  /  AlkPhos  49  02-12          Magnesium, Serum: 1.8 mg/dL (02-12-21 @ 14:44)              PT/INR - ( 12 Feb 2021 18:12 )   PT: 21.10 sec;   INR: 1.83 ratio         PTT - ( 12 Feb 2021 18:12 )  PTT:43.2 sec    Lactate Trend      CARDIAC MARKERS ( 12 Feb 2021 14:44 )  x     / <0.01 ng/mL / x     / x     / x            CXR pending official read 16.1   5.78  )-----------( 220      ( 12 Feb 2021 14:44 )             47.8       02-12    131<L>  |  93<L>  |  17  ----------------------------<  205<H>  4.1   |  25  |  1.3    Ca    8.7      12 Feb 2021 14:44  Mg     1.8     02-12    TPro  6.8  /  Alb  3.9  /  TBili  0.5  /  DBili  x   /  AST  45<H>  /  ALT  45<H>  /  AlkPhos  49  02-12          Magnesium, Serum: 1.8 mg/dL (02-12-21 @ 14:44)    PT/INR - ( 12 Feb 2021 18:12 )   PT: 21.10 sec;   INR: 1.83 ratio         PTT - ( 12 Feb 2021 18:12 )  PTT:43.2 sec    Lactate Trend      CARDIAC MARKERS ( 12 Feb 2021 14:44 )  x     / <0.01 ng/mL / x     / x     / x            CXR pending official read

## 2021-02-12 NOTE — H&P ADULT - ASSESSMENT
Patient is a 76 year old Male with a past medical history of HTN, HLD, BPH, Hepatomegaly, "Heart valve disorder" ?Afib on Coumadin, presented to the ER with a chief complaint of Weakness and shortness of breath x 1 week, covid + x 1 day. Patient is being admitted for increased weakness secondary to COVID infection     # COVID   - Isolate patient  - Will transfer to [place]   - PRN supplemental oxygen. Maintain Sats >92%.  - Monitor Mg level, CRP, Procalcitonin, LDH, Anticoags, and Inflammatory makers.   - ID Consult not needed at this time  - DVT PPX - Patient is already on Coumadin, Monitor INR daily and dose appropriately   - GI PPX (Protonix)  - IV Decadron because of SOB     # Weakness   - PT   - Physiatry consult   - Encourage ambulation   - Pulse ox w/ ambulation and at rest     # HTN/HLD   - Monitor VS   - DASH diet   - Continue with home medications     # DM   - Monitor Fingersticks  - Diabetic carbohydrate consistent diet   - Will hold Metformin and will give insulin (sliding scale)     # GERD   - Continue with home omeprazole (will give protonix equivalent)     # BPH   - Continue with Tamsulosin     # Sinus Pain with Headaches   - Analgesics PRN   - No antibiotics as no fevers along with multiple organ symptoms - likely viral     # Hyponatremia   - Will monitor AM labs     # Code Status   - DNR/DNI   - MOLST Form filled and in the chart  Patient is a 76 year old Male with a past medical history of HTN, HLD, BPH, Hepatomegaly, "Heart valve disorder" ?Afib on Coumadin, presented to the ER with a chief complaint of Weakness and shortness of breath x 1 week, covid + x 1 day. Patient is being admitted for increased weakness secondary to COVID infection     # COVID   - Isolate patient  - Will transfer to [place]   - PRN supplemental oxygen. Maintain Sats >92%. Found to desaturate to in mid 80% with exertion, improved with rest.   - Monitor Mg level, CRP, Procalcitonin, LDH, Anticoags, and Inflammatory makers.   - ID Consult not needed at this time  - DVT PPX - Patient is already on Coumadin, Monitor INR daily and dose appropriately   - GI PPX (Protonix)  - IV Decadron because of SOB     # Weakness   - PT   - Physiatry consult   - Encourage ambulation   - Pulse ox w/ ambulation and at rest     # HTN/HLD   - Monitor VS   - DASH diet   - Continue with home medications     # DM   - Monitor Fingersticks  - Diabetic carbohydrate consistent diet   - Will hold Metformin and will give insulin (sliding scale)     # GERD   - Continue with home omeprazole (will give protonix equivalent)     # BPH   - Continue with Tamsulosin     # Sinus Pain with Headaches   - Analgesics PRN   - No antibiotics as no fevers along with multiple organ symptoms - likely viral     # Hyponatremia   - Will monitor AM labs     # Code Status   - DNR/DNI   - MOLST Form filled and in the chart

## 2021-02-12 NOTE — ED PROVIDER NOTE - PROGRESS NOTE DETAILS
ATTENDING NOTE: I personally evaluated the patient. I reviewed the Physician assistant's note (as assigned above), and agree with the findings and plan except as documented in my note. 77 y/o M presents with worsening cough and congestion over the past week with decreased mobility and SOB with exertion. Pt tested positive for Covid 2 days prior as o/p and has been worsening. Denies CP, notes nausea, but no vomiting, diarrhea, or ABD pain. On exam: NCAT. PERRLA, EOMI. OP clear. Lungs: CTAB. RRR, S1S2 noted. Radial pulses 2+ and equal, pedal pulses 2+ and equal. Abdomen soft, NT/ND, no rebound or guarding. FROM x4 extremities. No focal neuro deficits. Plan: Pt desaturates to the upper 80’s after 10 steps. Will admit because of this. Obtained Labs, CXR and Covid swab.

## 2021-02-12 NOTE — ED ADULT TRIAGE NOTE - CHIEF COMPLAINT QUOTE
BIBA via [employer] from home- pt states he feels congested, cough denies fever, tested rapid test for corona positve on 2/12/2021

## 2021-02-12 NOTE — ED PROVIDER NOTE - NS ED ROS FT
Constitutional: (-) fever (-) malaise (-) diaphoresis (-) chills (-) wt. loss   Eyes: (-) visual changes (-) eye pain   ENMT: (+) nasal or chest congestion (+) runny nose (-) sore throat (-) hoarseness  (-) hearing changes (-) neck pain (-) neck stiffness  Cardiac: (-) chest pain  (-) palpitations (-) syncope (-) edema  Respiratory: (+) cough (-) hemoptysis (+) SOB (+) DILLON  GI: (-) nausea (-) vomiting (-) diarrhea (-) abdominal pain (-) hematochezia (-) changes in appetite  : (-) dysuria (-) increased frequency  (-) hematuria (-) incontinence  MS: (-) back pain (-) myalgia (-) muscle weakness (-)  joint pain  Neuro: (-) headache (-) dizziness (-) numbness/tingling to extremities B/L (-) weakness  Skin: (-) rash (-) laceration    Except as documented in the HPI, all other systems are negative.

## 2021-02-12 NOTE — H&P ADULT - NSHPSOCIALHISTORY_GEN_ALL_CORE
Tobacco use: Denies, neversmoker   EtOH use: Denies use  Illicit drug use: Denies use   Marital Status:

## 2021-02-12 NOTE — ED PROVIDER NOTE - ATTENDING CONTRIBUTION TO CARE
I was present for and supervised the key and critical aspects of the procedures performed during the care of the patient. ATTENDING NOTE: I personally evaluated the patient. I reviewed the Physician assistant's note (as assigned above), and agree with the findings and plan except as documented in my note. 75 y/o M presents with worsening cough and congestion over the past week with decreased mobility and SOB with exertion. Pt tested positive for Covid 2 days prior as o/p and has been worsening. Denies CP, notes nausea, but no vomiting, diarrhea, or ABD pain. On exam: NCAT. PERRLA, EOMI. OP clear. Lungs: CTAB. RRR, S1S2 noted. Radial pulses 2+ and equal, pedal pulses 2+ and equal. Abdomen soft, NT/ND, no rebound or guarding. FROM x4 extremities. No focal neuro deficits. Plan: Pt desaturates to the upper 80’s after 10 steps. Will admit because of this. Obtained Labs, CXR and Covid swab.

## 2021-02-12 NOTE — ED PROVIDER NOTE - OBJECTIVE STATEMENT
77 yo M pmhx HTN, DLD presenting to the ED c/o persistent, worsening dry cough, mild DILLON and nasal/chest congestion x 1 week. Pt reports he tested positive yesterday for COVID. Pt took vitamin C and Tylenol at home with minimal relief of symptoms. Denies fever, chills, nausea, vomiting, abdominal pain, diarrhea, leg swelling, headache, dizziness.

## 2021-02-12 NOTE — ED ADULT NURSE NOTE - SUICIDE SCREENING QUESTION 1
Consent: The patient's consent was obtained including but not limited to risks of crusting, scabbing, blistering, scarring, darker or lighter pigmentary change, recurrence, incomplete removal and infection.
Render Note In Bullet Format When Appropriate: No
Duration Of Freeze Thaw-Cycle (Seconds): 0
Post-Care Instructions: I reviewed with the patient in detail post-care instructions. Patient is to wear sunprotection, and avoid picking at any of the treated lesions. Pt may apply Vaseline to crusted or scabbing areas.  Patient made aware that some lesions may take more than one treatment to fully resolve and some lesion may recur.  Patient was instructed to return to the office if lesions are not resolved after 2 months.
Render Post-Care Instructions In Note?: yes
Detail Level: Detailed
Post-Care Instructions: I reviewed with the patient in detail post-care instructions. Patient is to wear sunprotection, and avoid picking at any of the treated lesions. Pt may apply Vaseline to crusted or scabbing areas.
Medical Necessity Information: It is in your best interest to select a reason for this procedure from the list below. All of these items fulfill various CMS LCD requirements except the new and changing color options.
Medical Necessity Clause: This procedure was medically necessary because the lesions that were treated were:
No

## 2021-02-12 NOTE — H&P ADULT - HISTORY OF PRESENT ILLNESS
Patient is a 76 year old Male with a past medical history of HTN, HLD, BPH, Hepatomegaly, "Heart valve disorder" ?Afib on Coumadin, presented to the ER with a chief complaint of Weakness and shortness of breath x 1 week. Patient was diagnosed with COVID x 1 day ago by a visiting nurse. Patient admits to sinus congestion with headaches. Patient admits to decreased mobility and lack of support since both him and his spouse is COVID+, chills. Patient denies fevers (Tmax 100.0), nausea, vomiting, chest pain, abdominal pain, dysuria, constipation, diarrhea, new onset of leg swelling. In the ER, patient desaturated to low 80s after taking 10 steps. Patient follows up with coumadin clinic [id number]. Next appointment is in March, 2021.  Patient is a 76 year old Male with a past medical history of HTN, HLD, BPH, Hepatomegaly, "Heart valve disorder" ?Afib on Coumadin, presented to the ER with a chief complaint of Weakness and shortness of breath x 1 week. Patient was diagnosed with COVID x 1 day ago (2/11) by a visiting nurse. Patient admits to sinus congestion with headaches. Patient admits to decreased mobility and lack of support since both him and his spouse is COVID+, chills. Patient denies fevers (Tmax 100.0), nausea, vomiting, chest pain, abdominal pain, dysuria, constipation, diarrhea, new onset of leg swelling. In the ER, patient desaturated to low 80s after taking 10 steps. Patient follows up with coumadin clinic [id number]. Next appointment is in March, 2021.

## 2021-02-12 NOTE — ED ADULT TRIAGE NOTE - DIRECT TO ROOM CARE INITIATED:
Please triage what symptoms pt has now.   I would advise cxr if cough persists, since he requests cough syrup.   If any worsening, new symptoms, he needs to be seen in clinic. Please schedule.    12-Feb-2021 14:24

## 2021-02-12 NOTE — H&P ADULT - NSICDXPASTMEDICALHX_GEN_ALL_CORE_FT
PAST MEDICAL HISTORY:  BPH (benign prostatic hyperplasia)     Enlarged liver     Essential hypertension     Gastroesophageal reflux disease     Heart valve disorder

## 2021-02-12 NOTE — ED ADULT NURSE NOTE - PMH
BPH (benign prostatic hyperplasia)    Enlarged liver    Essential hypertension    Gastroesophageal reflux disease    Heart valve disorder

## 2021-02-12 NOTE — ED PROVIDER NOTE - CARE PLAN
Principal Discharge DX:	COVID-19  Secondary Diagnosis:	Cough  Secondary Diagnosis:	SOB (shortness of breath)  Secondary Diagnosis:	Congestion of upper respiratory tract  Secondary Diagnosis:	Hypochloremia

## 2021-02-13 NOTE — CONSULT NOTE ADULT - ASSESSMENT
76 year old Male with a past medical history of HTN, HLD, BPH, Hepatomegaly, "Heart valve disorder" ?Afib on Coumadin, presented to the ER with a chief complaint of Weakness and shortness of breath x 1 week, covid + x 1 day. Patient is being admitted for increased weakness secondary to COVID infection     IMPRESSION;  COVID 19 with moderate illness. SpO2 >94% and not requiring supplemental O2.  Desaturates on ambulation  Pt is in the early viral replicative phase based on the timeline/onset of symptoms.   ddimers 68  CXR no significant opacities    RECOMMENDATIONS;  Target SpO2 92 % to 96 %  f/u ferritin, CRP, procalcitonin  Day 1 : Single  mg IV loading dose  Day 2-5 : single  mg IV once daily maintenance dose  Daily CBC,CMP.  Dexamethasone 6 mg iv q24h for 10 days.  Monitor for side effects: hyperglycemia, neurological ( agitation/confusion), adrenal suppression, bacterial and fungal infections  Type and screen.  Convalescent plasma one unit over 2h.  Adverse events to watch out for generally within 4 hours of completion of infusion  TACO: transfusion associated circulatory overload  TRALI :Transfusion related acute lung injury  Severe allergic transfusion reactions  COVID-19 antibodies  Anticoagulation as per team  Proning for 16h/day if pt can tolerate it.

## 2021-02-13 NOTE — PHYSICAL THERAPY INITIAL EVALUATION ADULT - PERTINENT HX OF CURRENT PROBLEM, REHAB EVAL
Patient is a 76 year old Male with a past medical history of HTN, HLD, BPH, Hepatomegaly, "Heart valve disorder" ?Afib on Coumadin, presented to the ER with a chief complaint of Weakness and shortness of breath x 1 week, covid + x 1 day. Patient is being admitted for increased weakness secondary to COVID infection

## 2021-02-13 NOTE — CONSULT NOTE ADULT - SUBJECTIVE AND OBJECTIVE BOX
ELIZA POWELL  76y, Male  Allergy: No Known Allergies      All historical available data reviewed.    HPI:  Patient is a 76 year old Male with a past medical history of HTN, HLD, BPH, Hepatomegaly, "Heart valve disorder" ?Afib on Coumadin, presented to the ER with a chief complaint of Weakness and shortness of breath x 1 week. Patient was diagnosed with COVID x 1 day ago (2/11) by a visiting nurse. Patient admits to sinus congestion with headaches. Patient admits to decreased mobility and lack of support since both him and his spouse is COVID+, chills. Patient denies fevers (Tmax 100.0), nausea, vomiting, chest pain, abdominal pain, dysuria, constipation, diarrhea, new onset of leg swelling. In the ER, patient desaturated to low 80s after taking 10 steps. Patient follows up with coumadin clinic [id number]. Next appointment is in March, 2021.  (12 Feb 2021 18:18)    FAMILY HISTORY:  Family history of esophageal cancer      PAST MEDICAL & SURGICAL HISTORY:  Gastroesophageal reflux disease    Enlarged liver    BPH (benign prostatic hyperplasia)    Essential hypertension    Heart valve disorder    History of back surgery          VITALS:  T(F): 99.9, Max: 101 (02-12-21 @ 17:20)  HR: 102  BP: 110/63  RR: 19Vital Signs Last 24 Hrs  T(C): 37.7 (13 Feb 2021 07:26), Max: 38.3 (12 Feb 2021 17:20)  T(F): 99.9 (13 Feb 2021 07:26), Max: 101 (12 Feb 2021 17:20)  HR: 102 (13 Feb 2021 07:26) (82 - 118)  BP: 110/63 (13 Feb 2021 07:26) (110/63 - 137/90)  BP(mean): --  RR: 19 (13 Feb 2021 07:26) (17 - 20)  SpO2: 95% (13 Feb 2021 07:26) (93% - 99%)    TESTS & MEASUREMENTS:                        16.1   5.17  )-----------( 203      ( 13 Feb 2021 06:26 )             47.0     02-13    136  |  99  |  15  ----------------------------<  192<H>  4.6   |  22  |  1.1    Ca    8.7      13 Feb 2021 06:26  Phos  4.2     02-12  Mg     2.0     02-12    TPro  6.4  /  Alb  3.4<L>  /  TBili  0.5  /  DBili  x   /  AST  55<H>  /  ALT  42<H>  /  AlkPhos  42  02-13    LIVER FUNCTIONS - ( 13 Feb 2021 06:26 )  Alb: 3.4 g/dL / Pro: 6.4 g/dL / ALK PHOS: 42 U/L / ALT: 42 U/L / AST: 55 U/L / GGT: x                   RADIOLOGY & ADDITIONAL TESTS:  Personal review of radiological diagnostics performed  Echo and EKG results noted when applicable.     MEDICATIONS:  acetaminophen   Tablet .. 650 milliGRAM(s) Oral every 4 hours PRN  acetaminophen  Suppository .. 650 milliGRAM(s) Rectal every 4 hours PRN  ALBUTerol    90 MICROgram(s) HFA Inhaler 2 Puff(s) Inhalation every 4 hours PRN  chlorhexidine 4% Liquid 1 Application(s) Topical <User Schedule>  dexAMETHasone  Injectable 6 milliGRAM(s) IV Push daily  dextrose 40% Gel 15 Gram(s) Oral once  dextrose 5%. 1000 milliLiter(s) IV Continuous <Continuous>  dextrose 5%. 1000 milliLiter(s) IV Continuous <Continuous>  dextrose 50% Injectable 25 Gram(s) IV Push once  dextrose 50% Injectable 12.5 Gram(s) IV Push once  dextrose 50% Injectable 25 Gram(s) IV Push once  diltiazem    milliGRAM(s) Oral daily  furosemide    Tablet 40 milliGRAM(s) Oral daily  glucagon  Injectable 1 milliGRAM(s) IntraMuscular once  guaifenesin/dextromethorphan  Syrup 10 milliLiter(s) Oral every 4 hours PRN  influenza   Vaccine 0.5 milliLiter(s) IntraMuscular once  insulin lispro (ADMELOG) corrective regimen sliding scale   SubCutaneous three times a day before meals  losartan 50 milliGRAM(s) Oral daily  melatonin 3 milliGRAM(s) Oral at bedtime PRN  metoprolol succinate ER 50 milliGRAM(s) Oral daily  pantoprazole    Tablet 40 milliGRAM(s) Oral before breakfast  simvastatin 20 milliGRAM(s) Oral at bedtime  tamsulosin 0.4 milliGRAM(s) Oral at bedtime  warfarin 3 milliGRAM(s) Oral at bedtime      ANTIBIOTICS:

## 2021-02-13 NOTE — PROGRESS NOTE ADULT - ATTENDING COMMENTS
Patient is comfortably sitting in bed   on room air - no acute distress     Plan:     1. Covid 19 pneumonia   - bilateral opacities on chest xray   - on dexa, RDV and 1 unit of plasma   - ID consult appreciated   - trend markers  - isolation as per protocol  - supplemental O2 to keep sats above 94% - currently on room air     2. valvular heart disease/ Chronic Afib/ essential HTN/ Mixed HLD   - continue coumadin to maintain INR 2-3  - continue home regimen     3. DMII   - sliding scale   - add basal bolus over next 24 hours trend     4. GERD - protonix     5. BPH - tamsulosin     Dispo: home when stable, can transfer to [facility] tomorrow after plasma

## 2021-02-13 NOTE — PHYSICAL THERAPY INITIAL EVALUATION ADULT - GENERAL OBSERVATIONS, REHAB EVAL
Pt encountered sitting at EOB in NAD, no c/o pain and agreeable to participate with PT. Pt is indep in bed mobility, supervision in transfer mobility and ambulation 40 ft without AD. Pt desating to 87% RA during ambulation, resting 91% RA, RN([facility]) made aware. Pt left seated at EOB in NAD.

## 2021-02-13 NOTE — PROGRESS NOTE ADULT - ASSESSMENT
76 year old Male with a past medical history of HTN, HLD, BPH, Hepatomegaly, "Heart valve disorder" ?Afib on Coumadin, presented to the ER with a chief complaint of Weakness and shortness of breath x 1 week, covid + x 1 day. Patient is being admitted for increased weakness secondary to COVID infection. Transferred to [place] from [facility]    #Severe COVID   - CXR bilateral opacities  - d-dimer 68 > 155, , trops -ve, ,   - c/w decadron until 2/22, coumadin  - Start RDV until 3/17. 1U plasma as per ID recs  - f/u CRP, Procalcitonin, covid ab  - Sat 93 on RA. Desats to 80s with ambulation    #Heart valve disorder  #Chronic A. fib  #HTN/HLD   - A fib with  on EKG 2/12  - c/w cardizem, losartan, metoprolol, lasix, statin  - c/w coumadin INR goal 2-3 assuming atrial valve    #DM - SSI. Start insulin regimen if needed    #Weakness - PT/Physiatry consult    #GERD - c/w protonix    #BPH - c/w Tamsulosin     #Sinus Pain with Headaches   - Analgesics PRN   - No antibiotics as no fevers along with multiple organ symptoms - likely viral     #Hyponatremia   - likely 2/2 poor PO intake    DVT ppx - coumadin  GI ppx - protonix  Diet - DASH  Code - DNR/DNI

## 2021-02-14 NOTE — PROGRESS NOTE ADULT - SUBJECTIVE AND OBJECTIVE BOX
SUBJECTIVE:  Patient is a 76y old Male who presents with a chief complaint of COVID (13 Feb 2021 09:47)   Currently admitted to medicine with the primary diagnosis of COVID-19     Today is hospital day 1d. There are no new issues or overnight events.     HPI  HPI:  Patient is a 76 year old Male with a past medical history of HTN, HLD, BPH, Hepatomegaly, "Heart valve disorder" ?Afib on Coumadin, presented to the ER with a chief complaint of Weakness and shortness of breath x 1 week. Patient was diagnosed with COVID x 1 day ago (2/11) by a visiting nurse. Patient admits to sinus congestion with headaches. Patient admits to decreased mobility and lack of support since both him and his spouse is COVID+, chills. Patient denies fevers (Tmax 100.0), nausea, vomiting, chest pain, abdominal pain, dysuria, constipation, diarrhea, new onset of leg swelling. In the ER, patient desaturated to low 80s after taking 10 steps. Patient follows up with coumadin clinic [id number]. Next appointment is in March, 2021.  (12 Feb 2021 18:18)      PAST MEDICAL & SURGICAL HISTORY  Gastroesophageal reflux disease    Enlarged liver    BPH (benign prostatic hyperplasia)    Essential hypertension    Heart valve disorder    History of back surgery      SOCIAL HISTORY:  Negative for smoking/alcohol/drug use.     ALLERGIES:  No Known Allergies    MEDICATIONS:  HOME MEDICATIONS  Coumadin 3 mg oral tablet: 1 tab(s) orally once a day  dilTIAZem 240 mg/24 hours oral capsule, extended release: 1 cap(s) orally once a day  furosemide 40 mg oral tablet: 1 tab(s) orally once a day  losartan 50 mg oral tablet: 1 tab(s) orally 2 times a day  metFORMIN 500 mg oral tablet: 1 tab(s) orally 2 times a day  metoprolol succinate 50 mg oral tablet, extended release: 1 tab(s) orally once a day  omeprazole 20 mg oral delayed release capsule: 1 cap(s) orally once a day  simvastatin 20 mg oral tablet: 1 tab(s) orally once a day (at bedtime)  tamsulosin 0.4 mg oral capsule: 1 cap(s) orally once a day    STANDING MEDICATIONS  chlorhexidine 4% Liquid 1 Application(s) Topical <User Schedule>  dexAMETHasone  Injectable 6 milliGRAM(s) IV Push daily  dextrose 40% Gel 15 Gram(s) Oral once  dextrose 5%. 1000 milliLiter(s) IV Continuous <Continuous>  dextrose 5%. 1000 milliLiter(s) IV Continuous <Continuous>  dextrose 50% Injectable 25 Gram(s) IV Push once  dextrose 50% Injectable 12.5 Gram(s) IV Push once  dextrose 50% Injectable 25 Gram(s) IV Push once  diltiazem    milliGRAM(s) Oral daily  furosemide    Tablet 40 milliGRAM(s) Oral daily  glucagon  Injectable 1 milliGRAM(s) IntraMuscular once  influenza   Vaccine 0.5 milliLiter(s) IntraMuscular once  insulin lispro (ADMELOG) corrective regimen sliding scale   SubCutaneous three times a day before meals  losartan 50 milliGRAM(s) Oral daily  metoprolol succinate ER 50 milliGRAM(s) Oral daily  pantoprazole    Tablet 40 milliGRAM(s) Oral before breakfast  simvastatin 20 milliGRAM(s) Oral at bedtime  tamsulosin 0.4 milliGRAM(s) Oral at bedtime  warfarin 3 milliGRAM(s) Oral at bedtime    PRN MEDICATIONS  acetaminophen   Tablet .. 650 milliGRAM(s) Oral every 4 hours PRN  acetaminophen  Suppository .. 650 milliGRAM(s) Rectal every 4 hours PRN  ALBUTerol    90 MICROgram(s) HFA Inhaler 2 Puff(s) Inhalation every 4 hours PRN  guaifenesin/dextromethorphan  Syrup 10 milliLiter(s) Oral every 4 hours PRN  melatonin 3 milliGRAM(s) Oral at bedtime PRN    VITALS:   ICU Vital Signs Last 24 Hrs  T(C): 37.7 (13 Feb 2021 07:26), Max: 38.3 (12 Feb 2021 17:20)  T(F): 99.9 (13 Feb 2021 07:26), Max: 101 (12 Feb 2021 17:20)  HR: 102 (13 Feb 2021 07:26) (82 - 118)  BP: 110/63 (13 Feb 2021 07:26) (110/63 - 137/90)  BP(mean): --  ABP: --  ABP(mean): --  RR: 19 (13 Feb 2021 07:26) (17 - 20)  SpO2: 95% (13 Feb 2021 07:26) (93% - 99%)    LABS:                        16.1   5.17  )-----------( 203      ( 13 Feb 2021 06:26 )             47.0     02-13    136  |  99  |  15  ----------------------------<  192<H>  4.6   |  22  |  1.1    Ca    8.7      13 Feb 2021 06:26  Phos  4.2     02-12  Mg     2.0     02-12    TPro  6.4  /  Alb  3.4<L>  /  TBili  0.5  /  DBili  x   /  AST  55<H>  /  ALT  42<H>  /  AlkPhos  42  02-13    PT/INR - ( 13 Feb 2021 06:26 )   PT: 22.20 sec;   INR: 1.93 ratio         PTT - ( 13 Feb 2021 06:26 )  PTT:37.2 sec    I&O's Detail    13 Feb 2021 07:01  -  13 Feb 2021 11:09  --------------------------------------------------------  IN:    Oral Fluid: 360 mL  Total IN: 360 mL    OUT:    Voided (mL): 600 mL  Total OUT: 600 mL    Total NET: -240 mL    Creatine Kinase, Serum: 127 U/L (02-12-21 @ 23:30)  Lactate, Blood: 2.0 mmol/L (02-12-21 @ 23:30)  Troponin T, Serum: <0.01 ng/mL (02-12-21 @ 14:44)    CARDIAC MARKERS ( 12 Feb 2021 23:30 )  x     / x     / 127 U/L / x     / x      CARDIAC MARKERS ( 12 Feb 2021 14:44 )  x     / <0.01 ng/mL / x     / x     / x        PHYSICAL EXAM:  GEN: No acute distress  HEENT: Normocephalic  NECK: Supple  LUNGS: Decreased breathe sounds  HEART: Regular  ABD: Soft, non-tender, non-distended.  EXT: NE/2+PP  NEURO: AAOX3  PSYCH: Appropriate mood, responds appropriately
Peggy Castellanos MD  Hospitalist   Spectra: [phone]    Patient is a 76y old  Male who presents with a chief complaint of COVID (13 Feb 2021 11:09)      INTERVAL HPI/OVERNIGHT EVENTS: no acute overnight events noted   comfortable on room air at rest   SOB + on ambulation     REVIEW OF SYSTEMS: negative  Vital Signs Last 24 Hrs  T(C): 36.1 (14 Feb 2021 12:43), Max: 38.2 (13 Feb 2021 23:30)  T(F): 97 (14 Feb 2021 12:43), Max: 100.7 (13 Feb 2021 23:30)  HR: 96 (14 Feb 2021 12:43) (96 - 129)  BP: 113/75 (14 Feb 2021 12:43) (105/74 - 139/73)  BP(mean): --  RR: 18 (14 Feb 2021 12:43) (18 - 18)  SpO2: 96% (14 Feb 2021 12:43) (95% - 99%)    PHYSICAL EXAM:   NAD; Normocephalic;   LUNGS - no wheezing  HEART: S1 S2+   ABDOMEN: Soft, Nontender, non distended  EXTREMITIES: no cyanosis; no edema  NERVOUS SYSTEM:  Awake and alert; no focal neuro deficits appreciated    LABS:                        15.3   6.37  )-----------( 235      ( 14 Feb 2021 07:38 )             45.5     02-14    134<L>  |  97<L>  |  19  ----------------------------<  186<H>  4.8   |  24  |  1.2    Ca    8.3<L>      14 Feb 2021 07:38  Phos  4.2     02-12  Mg     2.0     02-12    TPro  6.2  /  Alb  3.5  /  TBili  0.4  /  DBili  x   /  AST  52<H>  /  ALT  43<H>  /  AlkPhos  44  02-14    PT/INR - ( 14 Feb 2021 07:38 )   PT: 26.20 sec;   INR: 2.28 ratio         PTT - ( 13 Feb 2021 06:26 )  PTT:37.2 sec    CAPILLARY BLOOD GLUCOSE      POCT Blood Glucose.: 352 mg/dL (14 Feb 2021 12:15)  POCT Blood Glucose.: 316 mg/dL (14 Feb 2021 09:55)  POCT Blood Glucose.: 234 mg/dL (13 Feb 2021 21:20)  POCT Blood Glucose.: 271 mg/dL (13 Feb 2021 17:34)

## 2021-02-14 NOTE — PROGRESS NOTE ADULT - ASSESSMENT
Plan:     1. Covid 19 pneumonia   - bilateral opacities on chest xray   - on dexa, RDV and 1 unit of plasma   - ID consult appreciated   - trend markers  - isolation as per protocol  - supplemental O2 to keep sats above 94% - currently on room air     2. valvular heart disease/ Chronic Afib/ essential HTN/ Mixed HLD   - continue coumadin to maintain INR 2-3  - continue home regimen - 3 mg   - INR 2.28 this am     3. DMII   - sliding scale   - add basal bolus over next 24 hours trend     4. GERD - protonix     5. BPH - tamsulosin     Dispo: home when stable,   - currently getting plasma --> plan to transfer to [facility] after transfusion   patient is agreeable

## 2021-02-15 PROBLEM — K21.9 GASTRO-ESOPHAGEAL REFLUX DISEASE WITHOUT ESOPHAGITIS: Chronic | Status: ACTIVE | Noted: 2021-01-01

## 2021-02-15 PROBLEM — I38 ENDOCARDITIS, VALVE UNSPECIFIED: Chronic | Status: ACTIVE | Noted: 2021-01-01

## 2021-02-15 PROBLEM — N40.0 BENIGN PROSTATIC HYPERPLASIA WITHOUT LOWER URINARY TRACT SYMPTOMS: Chronic | Status: ACTIVE | Noted: 2021-01-01

## 2021-02-15 PROBLEM — I10 ESSENTIAL (PRIMARY) HYPERTENSION: Chronic | Status: ACTIVE | Noted: 2021-01-01

## 2021-02-15 PROBLEM — R16.0 HEPATOMEGALY, NOT ELSEWHERE CLASSIFIED: Chronic | Status: ACTIVE | Noted: 2021-01-01

## 2021-02-15 NOTE — ED ADULT TRIAGE NOTE - CHIEF COMPLAINT QUOTE
Transfer from [facility] for desat to 80's, place on NRB 15L and sat remained at upper 80's to low 90's. Pt received in no acute distress, speaking in full sentences.

## 2021-02-15 NOTE — CHART NOTE - NSCHARTNOTEFT_GEN_A_CORE
Pt transferred back [facility] from [facility] due to desaturation. Pt prones and on NRB therefore transferred. Currently on HiFlo 100/50LPM sat 97%. Wean o2 as able. Labs and CXR ordered. See main daily note for full a/p.

## 2021-02-15 NOTE — CHART NOTE - NSCHARTNOTEFT_GEN_A_CORE
Patient received at [facility] with O2 93% on 6L via NC. Upon ambulating from stretcher to bed, the patient was noted to have increased WOB and desat to 80s. He was placed on NRB and is currently comfortably with O2 of 93%. Patient feels uncomfortable when proning so will hold off for now.    Desat may be related to transfer/ambulation. Will reassess in 30 minutes. Consider transfer back to [facility] if patient continues to have increased O2 requirements,

## 2021-02-15 NOTE — ED ADULT NURSE NOTE - OBJECTIVE STATEMENT
pt transfer from [facility]/o [facility]. pt 02 sat 80s on 15L nonrebreather. pt states he was covid positive on friday, was doing well, yesterday he received plasma and was having trouble breathing after the infusion. pt able to speak in full sentences, aox4.

## 2021-02-15 NOTE — PROGRESS NOTE ADULT - ATTENDING COMMENTS
Patient seen and examined, currently comfortable on 50L 100% high flow saturating 92%, patient's wife if currently hospitalized at [facility], son is also hospitalized at [facility]. Patient declines need to contact family at this time, states he is in touch with him. Patient was transferred back from [facility] yesterday in light of increasing oxygen requirement.    #Acute Hypoxemic Respiratory Failure secondary to Severe COVID-19 Pneumonia, no evidence of superimposed bacterial infection: Patient is on day 3 of Remdesivir and Dexamethasone, s/p 1 unit plasma, pending covid antibody, monitor proinflammatory markers, ID follow up appreciated, prone positioning and wean down oxygen if tolerated, pulmonary toilet, incentive spirometry, CXR reviewed today awaiting official read    #History Valvular Heart Disease/Chronic AFIB: on warfarin, monitor INR and dose accordingly     #HTN: controlled continue cardizem, losartan     #DM II: monitor fingersticks, basal bolus insulin      #BPH: on Tamsulosin     Disposition: Remains acute

## 2021-02-15 NOTE — ED ADULT NURSE REASSESSMENT NOTE - NS ED NURSE REASSESS COMMENT FT1
Received report from prior RN. No s/s of distress noted. Fall precautions maintained, BA in place. Iso precautions maintained. Pt on Tele/O2 monitor. Will f/u.

## 2021-02-15 NOTE — CHART NOTE - NSCHARTNOTEFT_GEN_A_CORE
Despite 1 hour on NRB and proning, unable to transition patient to NC -- on 5L with proning, O2 drops to 83%.     Patient is stable with no respiratory distress however requires NRB to maintain appropriate O2 sat and therefore will be transferred back to [facility]. Dr. Lewis in ED accepts transfer.

## 2021-02-15 NOTE — ED PROVIDER NOTE - OBJECTIVE STATEMENT
Pt is a 76M with a pmhx of HTN, HLD, BPH, and afib presenting with shortness of breath. Pt was [place] to [place] for covid pneumonia today, desatted to 80s on 6l NC, required nonrebreather mask and was transferred back to [place] ED. Pt denies chest pain, abdominal pain, N/V/D, fevers/chills.

## 2021-02-15 NOTE — ED PROVIDER NOTE - NS ED ROS FT
Eyes:  No visual changes, eye pain or discharge.  ENMT:  No hearing changes, pain, no sore throat or runny nose, no difficulty swallowing  Cardiac:  No chest pain, or edema. No chest pain with exertion.  Respiratory:  + SOB, cough, mild respiratory distress. No hemoptysis. No history of asthma or RAD.  GI:  No nausea, vomiting, diarrhea or abdominal pain.  :  No dysuria, frequency or burning.  MS:  No myalgia, muscle weakness, joint pain or back pain.  Neuro:  No headache or weakness.  No LOC.  Skin:  No skin rash.   Endocrine: No history of thyroid disease or diabetes.

## 2021-02-15 NOTE — ED ADULT NURSE NOTE - CHIEF COMPLAINT QUOTE
Transfer from [facility] for desat to 80's, place on NRB 15L and sat remained at upper 80's to low 90's. Pt received in no acute distress, speaking in full sentences.

## 2021-02-15 NOTE — ED PROVIDER NOTE - PHYSICAL EXAMINATION
CONSTITUTIONAL: Well-developed; well-nourished; in no acute distress.   SKIN: warm, dry  HEAD: Normocephalic; atraumatic.  EYES: PERRL, EOMI, normal sclera and conjunctiva   ENT: No nasal discharge; airway clear.  NECK: Supple; non tender.  CARD: S1, S2 normal; no murmurs, gallops, or rubs. Regular rate and rhythm.   RESP: tachypneic, mild increased work of breathing  ABD: soft ntnd  EXT: Normal ROM.  No clubbing, cyanosis or edema.   LYMPH: No acute cervical adenopathy.  NEURO: Alert, oriented, grossly unremarkable  PSYCH: Cooperative, appropriate.

## 2021-02-15 NOTE — PROGRESS NOTE ADULT - ASSESSMENT
76 year old Male with a past medical history of HTN, HLD, BPH, Hepatomegaly, valvular heart disease/ chronic Afib on Coumadin, presented to the ER with a chief complaint of Weakness and shortness of breath x 1 week, covid + x 1 day. Patient is being admitted for increased weakness secondary to COVID infection . Transferred back to [facility] , was dced to [facility] after plasma but p[atient became hypoxic.     #Acute hypoxemic respiratory failure secondary to COVID-19 pneumonia- worsening  - COVID-19 PCR positive  - EKG: a fib  - Chest X-ray: b/l opacities, repeat cxr  - Patient is saturating on 60 L HFNC >>94-97%>>>wean as tolerated  - s/p plasma (2/13)  - currently on RDV  - on dexamenthasone  - Inflammatory markers:   ·	    DDimer: 155>>355 (2/15)  ·	CRP: 8>>10(2/14)  ·	Procal:0.06>>0.08(2/14)  ·	Ferritin: 982>>1137 (2/14)  - Incentive spirometry at bedside, albuterol prn MDI  - ID reconsulted as per attending  - DVT prophylaxis per protocol  - Titrate supplemental O2 to maintain SpO2 92-96%  - supportive measures : antipyretics, antitussives  - Isolation precautions (contact, droplet, airborne)    #valvular heart disease/ Chronic Afib/ essential HTN/ Mixed HLD   - continue coumadin to maintain INR 2-3  - continue home regimen - 3 mg , ordered for tonight  - daily INR  - INR 2.37 this am   - c/w toprol 50 mg, lasix, cardiazem 240 CD daily, simvastatin 20mg daily  - cardiologist     # DMII - uncontrolled  - will start basal bolus once weight is documented  - monitor FS ACHS  - A1C with Estimated Average Glucose Result: 11.0:    #GERD - protonix     #BPH - tamsulosin     #DIET: DASH/CC  #DVTppx:   #GIppx: protonix  #Activity:Increase as tolerated  #CODE: FULL, patient was DNR /DNI resented to full code  Dispo home when stable, fully functional at baseline 76 year old Male with a past medical history of HTN, HLD, BPH, Hepatomegaly, valvular heart disease/ chronic Afib on Coumadin, presented to the ER with a chief complaint of Weakness and shortness of breath x 1 week, covid + x 1 day. Patient is being admitted for increased weakness secondary to COVID infection . Transferred back to [facility] , was dced to [facility] after plasma but p[atient became hypoxic.     #Acute hypoxemic respiratory failure secondary to COVID-19 pneumonia- worsening  - COVID-19 PCR positive  - EKG: a fib  - Chest X-ray: b/l opacities, repeat cxr  - Patient is saturating on 60 L HFNC >>94-97%>>>wean as tolerated  - s/p plasma (2/13)  - currently on RDV  - on dexamenthasone  - Inflammatory markers:   ·	    DDimer: 155>>355 (2/15)  ·	CRP: 8>>10(2/14)  ·	Procal:0.06>>0.08(2/14)  ·	Ferritin: 982>>1137 (2/14)  - Incentive spirometry at bedside, albuterol prn MDI  - ID reconsulted as per attending  - will order 1 dose of 40mg IV lasix>>>likely plasma decompensated hiom, CXR with congestion  - DVT prophylaxis per protocol  - Titrate supplemental O2 to maintain SpO2 92-96%  - supportive measures : antipyretics, antitussives  - Isolation precautions (contact, droplet, airborne)    #valvular heart disease/ Chronic Afib/ essential HTN/ Mixed HLD   - continue coumadin to maintain INR 2-3  - continue home regimen - 3 mg , ordered for tonight  - daily INR  - INR 2.37 this am   - c/w toprol 50 mg, lasix, cardiazem 240 CD daily, simvastatin 20mg daily  - cardiologist     # DMII - uncontrolled  - will start basal bolus once weight is documented  - monitor FS ACHS  - A1C with Estimated Average Glucose Result: 11.0:    #GERD - protonix     #BPH - tamsulosin     #DIET: DASH/CC  #DVTppx:   #GIppx: protonix  #Activity:Increase as tolerated  #CODE: FULL, patient was DNR /DNI resented to full code  Dispo home when stable, fully functional at baseline

## 2021-02-15 NOTE — CHART NOTE - NSCHARTNOTEFT_GEN_A_CORE
Pt transferred back [facility] from [facility] due to desaturation. Pt prones and on NRB therefore transferred. Currently on HiFlo 100/50LPM sat 97%. Wean o2 as able. Labs and CXR ordered. See main daily note for full a/p

## 2021-02-15 NOTE — PROGRESS NOTE ADULT - SUBJECTIVE AND OBJECTIVE BOX
ELIZA POWELL 76y Male  MRN#: [id number]   CODE STATUS: FULL      SUBJECTIVE  Patient is a 76y old Male who presents with a chief complaint of SOB  Currently admitted to medicine with the primary diagnosis of acute hypoxic respiratory failure 2/2 COVID pneumonia    Today is hospital day ,   INTERVAL HPI/OVERNIGHT EVENTS:  Patient feels better on HFnC    This morning he is laying in bed comfortably . Denies chest pain, shortness of breath, abdominal pain, nausea, vomiting or changes in bowel habits. HE has   has no complaints today.     Urinating and stooling appropriately.    Present Today:           Haines Catheter (x)No/ ()Yes?   Indication:             Central Line (x)No/ ()Yes?   Indication:          IV Fluids (x)No/ ()Yes? Type:  Rate:  Indication:    OBJECTIVE  PAST MEDICAL & SURGICAL HISTORY  Gastroesophageal reflux disease    Enlarged liver    BPH (benign prostatic hyperplasia)    Essential hypertension    Heart valve disorder    History of back surgery      ALLERGIES:  No Known Allergies    HOME MEDICATIONS:  Home Medications:  Coumadin 3 mg oral tablet: 1 tab(s) orally once a day (12 Feb 2021 18:29)  dilTIAZem 240 mg/24 hours oral capsule, extended release: 1 cap(s) orally once a day (12 Feb 2021 18:29)  furosemide 40 mg oral tablet: 1 tab(s) orally once a day (12 Feb 2021 18:29)  losartan 50 mg oral tablet: 1 tab(s) orally 2 times a day (12 Feb 2021 18:29)  metFORMIN 500 mg oral tablet: 1 tab(s) orally 2 times a day (12 Feb 2021 18:29)  metoprolol succinate 50 mg oral tablet, extended release: 1 tab(s) orally once a day (12 Feb 2021 18:29)  omeprazole 20 mg oral delayed release capsule: 1 cap(s) orally once a day (12 Feb 2021 18:29)  simvastatin 20 mg oral tablet: 1 tab(s) orally once a day (at bedtime) (12 Feb 2021 18:29)  tamsulosin 0.4 mg oral capsule: 1 cap(s) orally once a day (12 Feb 2021 18:29)    MEDICATIONS:  STANDING MEDICATIONS  dexAMETHasone  Injectable 6 milliGRAM(s) IV Push daily  famotidine    Tablet 20 milliGRAM(s) Oral daily  warfarin 3 milliGRAM(s) Oral once    PRN MEDICATIONS  acetaminophen   Tablet .. 650 milliGRAM(s) Oral every 4 hours PRN      VITAL SIGNS: Last 24 Hours  T(C): 37.2 (15 Feb 2021 09:16), Max: 37.8 (15 Feb 2021 04:17)  T(F): 99 (15 Feb 2021 09:16), Max: 100 (15 Feb 2021 04:17)  HR: 95 (15 Feb 2021 09:16) (72 - 107)  BP: 120/69 (15 Feb 2021 09:16) (95/56 - 129/84)  BP(mean): 85 (15 Feb 2021 09:16) (85 - 85)  RR: 20 (15 Feb 2021 07:22) (18 - 24)  SpO2: 93% (15 Feb 2021 09:16) (88% - 97%)    LABS:                        15.8   7.55  )-----------( 281      ( 15 Feb 2021 04:20 )             45.5     02-15    136  |  99  |  26<H>  ----------------------------<  191<H>  4.0   |  25  |  1.2    Ca    8.5      15 Feb 2021 04:20    TPro  6.5  /  Alb  3.6  /  TBili  0.4  /  DBili  x   /  AST  52<H>  /  ALT  44<H>  /  AlkPhos  54  02-15    PT/INR - ( 15 Feb 2021 04:20 )   PT: 27.30 sec;   INR: 2.37 ratio          Troponin T, Serum: <0.01 ng/mL (02-15-21 @ 04:20)    CARDIAC MARKERS ( 15 Feb 2021 04:20 )  x     / <0.01 ng/mL / x     / x     / x          RADIOLOGY:   Xray Chest 1 View- PORTABLE-Urgent (Xray Chest 1 View- PORTABLE-Urgent .) (02.12.21 @ 15:12) >  Bilateral opacities    PHYSICAL EXAM:    GENERAL: NAD, well-developed, obese, AAOx3  HEENT:  NC+, Atraumatic, Normocephalic. EOMI, PERRLA, conjunctiva and sclera clear, No JVD  PULMONARY: Crackles + ; No wheeze  CARDIOVASCULAR: Regular rate and irregular  rhythm; No murmurs, rubs, or gallops  GASTROINTESTINAL: Soft, Nontender, Nondistended; Bowel sounds present  MUSCULOSKELETAL:  2+ Peripheral Pulses, No clubbing, cyanosis, or edema  NEUROLOGY: non-focal  SKIN: No rashes or lesions

## 2021-02-15 NOTE — ED ADULT NURSE REASSESSMENT NOTE - NS ED NURSE REASSESS COMMENT FT1
pt BIBA from [facility] for SOB on O2 via NC @5L. On arrival to ED, pt on NRB mask @15L O2 Sat 88%   BP and HR stable. Pt COVID +   Pt brought to crit iso room

## 2021-02-15 NOTE — ED PROVIDER NOTE - PROGRESS NOTE DETAILS
TD; Pt placed on high flow NC, satting 97% now, admitted to floor, pt endorsed to MAR resident Patient evaluated upon arrival from [facility]. Placed on HFNC with improvement of o2 saturation 97%. VS stable, patient awake and alert, comfortable with no tachypnea. Admitted to medicine for further evaluation and treatment.

## 2021-02-16 NOTE — PROGRESS NOTE ADULT - ATTENDING COMMENTS
Plan:     1. Acute hypoxic respiratory failure 2/2 Covid 19 pneumonia   - bilateral opacities on chest xray   - on dexa, RDV and 1 unit of plasma   - ID consult reconsulted after increase in oxygen requirement for possible candidate for toci   - trend markers - slightly trending up   - isolation as per protocol  - currently on 4/4 --> BIPAP and highflow     2. valvular heart disease/ Chronic Afib/ essential HTN/ Mixed HLD   - continue coumadin to maintain INR 2-3  - home regimen - 3 mg   - INR 2.95 this am   - will skip dose tonight and restart from tomorrow     3. DMII   - sliding scale     4. GERD - protonix     5. BPH - tamsulosin     Dispo: home when stable -->on highflow and bipap

## 2021-02-16 NOTE — PROGRESS NOTE ADULT - SUBJECTIVE AND OBJECTIVE BOX
ELIZA POWELL 76y Male  MRN#: [id number]   Hospital Day: 1d    SUBJECTIVE  Patient is a 76y old Male who presents with a chief complaint of COVID pneumonia (15 Feb 2021 09:57)  Currently admitted to medicine with the primary diagnosis of COVID-19      INTERVAL HPI AND OVERNIGHT EVENTS:  Patient was examined and seen at bedside. This morning he is resting comfortably in bed and reports no issues or overnight events.    REVIEW OF SYMPTOMS:  CONSTITUTIONAL: No weakness, fevers or chills; No headaches  EYES: No visual changes, eye pain, or discharge  ENT: No vertigo; No ear pain or change in hearing; No sore throat or difficulty swallowing  NECK: No pain or stiffness  RESPIRATORY: No cough, wheezing, or hemoptysis; No shortness of breath  CARDIOVASCULAR: No chest pain or palpitations  GASTROINTESTINAL: No abdominal or epigastric pain; No nausea, vomiting, or hematemesis; No diarrhea or constipation; No melena or hematochezia  GENITOURINARY: No dysuria, frequency or hematuria  MUSCULOSKELETAL: No joint pain, no muscle pain, no weakness  NEUROLOGICAL: No numbness or weakness  SKIN: No itching or rashes    OBJECTIVE  PAST MEDICAL & SURGICAL HISTORY  Gastroesophageal reflux disease    Enlarged liver    BPH (benign prostatic hyperplasia)    Essential hypertension    Heart valve disorder    History of back surgery      ALLERGIES:  No Known Allergies    MEDICATIONS:  STANDING MEDICATIONS  dexAMETHasone  Injectable 6 milliGRAM(s) IV Push daily  dextrose 40% Gel 15 Gram(s) Oral once  dextrose 5%. 1000 milliLiter(s) IV Continuous <Continuous>  dextrose 5%. 1000 milliLiter(s) IV Continuous <Continuous>  dextrose 5%. 1000 milliLiter(s) IV Continuous <Continuous>  dextrose 50% Injectable 25 Gram(s) IV Push once  dextrose 50% Injectable 12.5 Gram(s) IV Push once  dextrose 50% Injectable 25 Gram(s) IV Push once  famotidine    Tablet 20 milliGRAM(s) Oral daily  glucagon  Injectable 1 milliGRAM(s) IntraMuscular once  glucagon  Injectable 1 milliGRAM(s) IntraMuscular once  influenza   Vaccine 0.5 milliLiter(s) IntraMuscular once  insulin glargine Injectable (LANTUS) 15 Unit(s) SubCutaneous at bedtime  insulin lispro (ADMELOG) corrective regimen sliding scale   SubCutaneous three times a day before meals  insulin lispro (ADMELOG) corrective regimen sliding scale   SubCutaneous at bedtime  insulin lispro Injectable (ADMELOG) 6 Unit(s) SubCutaneous three times a day before meals  remdesivir  IVPB 100 milliGRAM(s) IV Intermittent every 24 hours  sodium zirconium cyclosilicate 5 Gram(s) Oral once    PRN MEDICATIONS  acetaminophen   Tablet .. 650 milliGRAM(s) Oral every 4 hours PRN      VITAL SIGNS: Last 24 Hours  T(C): 36.7 (16 Feb 2021 07:30), Max: 37.3 (15 Feb 2021 15:00)  T(F): 98.1 (16 Feb 2021 07:30), Max: 99.1 (15 Feb 2021 15:00)  HR: 104 (16 Feb 2021 07:30) (85 - 106)  BP: 112/73 (16 Feb 2021 07:30) (101/67 - 118/85)  BP(mean): 73 (15 Feb 2021 15:00) (73 - 73)  RR: 19 (16 Feb 2021 07:30) (19 - 22)  SpO2: 90% (16 Feb 2021 07:30) (90% - 94%)    LABS:                        15.8   9.77  )-----------( 349      ( 16 Feb 2021 04:54 )             47.6     02-16    137  |  99  |  29<H>  ----------------------------<  241<H>  5.2<H>   |  24  |  1.2    Ca    8.6      16 Feb 2021 04:54  Phos  4.1     02-16  Mg     2.1     02-16    TPro  6.6  /  Alb  3.5  /  TBili  0.5  /  DBili  x   /  AST  48<H>  /  ALT  40  /  AlkPhos  53  02-16    PT/INR - ( 16 Feb 2021 04:54 )   PT: 33.90 sec;   INR: 2.95 ratio         PTT - ( 16 Feb 2021 04:54 )  PTT:42.6 sec      Creatine Kinase, Serum: 55 U/L (02-16-21 @ 04:54)  Lactate, Blood: 2.4 mmol/L <H> (02-16-21 @ 04:54)  Troponin T, Serum: <0.01 ng/mL (02-16-21 @ 04:54)      CARDIAC MARKERS ( 16 Feb 2021 04:54 )  x     / <0.01 ng/mL / 55 U/L / x     / x      CARDIAC MARKERS ( 15 Feb 2021 04:20 )  x     / <0.01 ng/mL / x     / x     / x          RADIOLOGY:      PHYSICAL EXAM:  CONSTITUTIONAL: No acute distress, well-developed, well-groomed, AAOx3  HEAD: Atraumatic, normocephalic  EYES: EOM intact, PERRLA, conjunctiva and sclera clear  ENT: Supple, no masses, no thyromegaly, no bruits, no JVD; moist mucous membranes  PULMONARY: Clear to auscultation bilaterally; no wheezes, rales, or rhonchi  CARDIOVASCULAR: Regular rate and rhythm; no murmurs, rubs, or gallops  GASTROINTESTINAL: Soft, non-tender, non-distended; bowel sounds present  MUSCULOSKELETAL: 2+ peripheral pulses; no clubbing, no cyanosis, no edema  NEUROLOGY: non-focal  SKIN: No rashes or lesions; warm and dry    ASSESSMENT & PLAN  76 year old Male with a past medical history of HTN, HLD, BPH, Hepatomegaly, valvular heart disease/ chronic Afib on Coumadin, presented to the ER with a chief complaint of Weakness and shortness of breath x 1 week, covid + x 1 day. Patient is being admitted for increased weakness secondary to COVID infection . Transferred back to [facility] , was dced to [facility] after plasma but p[atient became hypoxic.     #Acute hypoxemic respiratory failure secondary to COVID-19 pneumonia- worsening  - COVID-19 PCR positive  - EKG: a fib  - Chest X-ray: b/l opacities, repeat cxr  - Patient is saturating on 60 L HFNC >>94-97%>>>wean as tolerated  - s/p plasma (2/13)  - currently on RDV  - on dexamenthasone  - Inflammatory markers:   ·	    DDimer: 155>>355 (2/15)  ·	CRP: 8>>10(2/14)  ·	Procal:0.06>>0.08(2/14)  ·	Ferritin: 982>>1137 (2/14)  - Incentive spirometry at bedside, albuterol prn MDI  - ID reconsulted as per attending  - will order 1 dose of 40mg IV lasix>>>likely plasma decompensated hiom, CXR with congestion  - DVT prophylaxis per protocol  - Titrate supplemental O2 to maintain SpO2 92-96%  - supportive measures : antipyretics, antitussives  - Isolation precautions (contact, droplet, airborne)    #valvular heart disease/ Chronic Afib/ essential HTN/ Mixed HLD   - continue coumadin to maintain INR 2-3  - continue home regimen - 3 mg , ordered for tonight  - daily INR  - INR 2.37 this am   - c/w toprol 50 mg, lasix, cardiazem 240 CD daily, simvastatin 20mg daily  - cardiologist     # DMII - uncontrolled  - increasing insulin to 15/6/6//6 + sliding scale   - monitor FS ACHS  - A1C with Estimated Average Glucose Result: 11.0    #GERD  - c/w  protonix     #BPH  - c/w tamsulosin     #DIET: DASH/CC  #DVTppx:   #GIppx: protonix  #Activity: Increase as tolerated  #CODE: FULL, patient was DNR /DNI resented to full code  Dispo home when stable, fully functional at baseline ELIZA POWELL 76y Male  MRN#: [id number]   Hospital Day: 1d    SUBJECTIVE  Patient is a 76y old Male who presents with a chief complaint of COVID pneumonia (15 Feb 2021 09:57)  Currently admitted to medicine with the primary diagnosis of COVID-19      INTERVAL HPI AND OVERNIGHT EVENTS:  Patient was examined and seen at bedside. This morning he is resting comfortably in bed and reports no issues or overnight events.    REVIEW OF SYMPTOMS:  CONSTITUTIONAL: + weakness, no fevers or chills; No headaches  EYES: No visual changes, eye pain, or discharge  ENT: No vertigo; No ear pain or change in hearing; No sore throat or difficulty swallowing  NECK: No pain or stiffness  RESPIRATORY: No cough, wheezing, or hemoptysis; + shortness of breath  CARDIOVASCULAR: No chest pain or palpitations  GASTROINTESTINAL: No abdominal or epigastric pain; No nausea, vomiting, or hematemesis; No diarrhea or constipation; No melena or hematochezia  GENITOURINARY: No dysuria, frequency or hematuria  MUSCULOSKELETAL: No joint pain, no muscle pain, no weakness  NEUROLOGICAL: No numbness or weakness  SKIN: No itching or rashes    OBJECTIVE  PAST MEDICAL & SURGICAL HISTORY  Gastroesophageal reflux disease    Enlarged liver    BPH (benign prostatic hyperplasia)    Essential hypertension    Heart valve disorder    History of back surgery      ALLERGIES:  No Known Allergies    MEDICATIONS:  STANDING MEDICATIONS  dexAMETHasone  Injectable 6 milliGRAM(s) IV Push daily  dextrose 40% Gel 15 Gram(s) Oral once  dextrose 5%. 1000 milliLiter(s) IV Continuous <Continuous>  dextrose 5%. 1000 milliLiter(s) IV Continuous <Continuous>  dextrose 5%. 1000 milliLiter(s) IV Continuous <Continuous>  dextrose 50% Injectable 25 Gram(s) IV Push once  dextrose 50% Injectable 12.5 Gram(s) IV Push once  dextrose 50% Injectable 25 Gram(s) IV Push once  famotidine    Tablet 20 milliGRAM(s) Oral daily  glucagon  Injectable 1 milliGRAM(s) IntraMuscular once  glucagon  Injectable 1 milliGRAM(s) IntraMuscular once  influenza   Vaccine 0.5 milliLiter(s) IntraMuscular once  insulin glargine Injectable (LANTUS) 15 Unit(s) SubCutaneous at bedtime  insulin lispro (ADMELOG) corrective regimen sliding scale   SubCutaneous three times a day before meals  insulin lispro (ADMELOG) corrective regimen sliding scale   SubCutaneous at bedtime  insulin lispro Injectable (ADMELOG) 6 Unit(s) SubCutaneous three times a day before meals  remdesivir  IVPB 100 milliGRAM(s) IV Intermittent every 24 hours  sodium zirconium cyclosilicate 5 Gram(s) Oral once    PRN MEDICATIONS  acetaminophen   Tablet .. 650 milliGRAM(s) Oral every 4 hours PRN      VITAL SIGNS: Last 24 Hours  T(C): 36.7 (16 Feb 2021 07:30), Max: 37.3 (15 Feb 2021 15:00)  T(F): 98.1 (16 Feb 2021 07:30), Max: 99.1 (15 Feb 2021 15:00)  HR: 104 (16 Feb 2021 07:30) (85 - 106)  BP: 112/73 (16 Feb 2021 07:30) (101/67 - 118/85)  BP(mean): 73 (15 Feb 2021 15:00) (73 - 73)  RR: 19 (16 Feb 2021 07:30) (19 - 22)  SpO2: 90% (16 Feb 2021 07:30) (90% - 94%)    LABS:                        15.8   9.77  )-----------( 349      ( 16 Feb 2021 04:54 )             47.6     02-16    137  |  99  |  29<H>  ----------------------------<  241<H>  5.2<H>   |  24  |  1.2    Ca    8.6      16 Feb 2021 04:54  Phos  4.1     02-16  Mg     2.1     02-16    TPro  6.6  /  Alb  3.5  /  TBili  0.5  /  DBili  x   /  AST  48<H>  /  ALT  40  /  AlkPhos  53  02-16    PT/INR - ( 16 Feb 2021 04:54 )   PT: 33.90 sec;   INR: 2.95 ratio         PTT - ( 16 Feb 2021 04:54 )  PTT:42.6 sec      Creatine Kinase, Serum: 55 U/L (02-16-21 @ 04:54)  Lactate, Blood: 2.4 mmol/L <H> (02-16-21 @ 04:54)  Troponin T, Serum: <0.01 ng/mL (02-16-21 @ 04:54)      CARDIAC MARKERS ( 16 Feb 2021 04:54 )  x     / <0.01 ng/mL / 55 U/L / x     / x      CARDIAC MARKERS ( 15 Feb 2021 04:20 )  x     / <0.01 ng/mL / x     / x     / x          RADIOLOGY:  Xray Chest 1 View- PORTABLE-Urgent (02.15.21 @ 06:59)     Impression: Stable bilateral pulmonary opacities.      PHYSICAL EXAM:  CONSTITUTIONAL: No acute distress but appears lethargic AAOx3  HEAD: Atraumatic, normocephalic  EYES: conjunctiva and sclera clear  PULMONARY: Clear to auscultation bilaterally  CARDIOVASCULAR: Regular rate and rhythm  GASTROINTESTINAL: Soft, non-tender, non-distended  MUSCULOSKELETAL:  no edema  NEUROLOGY: non-focal  SKIN: No rashes or lesions    ASSESSMENT & PLAN  76 year old Male with a past medical history of HTN, HLD, BPH, Hepatomegaly, valvular heart disease/ chronic Afib on Coumadin, presented to the ER with a chief complaint of Weakness and shortness of breath x 1 week, covid + x 1 day. Patient is being admitted for increased weakness secondary to COVID infection . Transferred back to [facility] , was dced to [facility] after plasma but p[atient became hypoxic.     #Acute hypoxemic respiratory failure secondary to COVID-19 pneumonia- worsening  - COVID-19 PCR positive  - EKG: a fib  - Chest X-ray: b/l opacities, repeat cxr  - Patient is saturating on 60 L/100% HFNC > wean as tolerated  - s/p plasma (2/13)  - c/w RDV Day 2   - c/w decadron Day 3     - Inflammatory markers:   ·	 DDimer: 155>>355  >> 197   ·	CRP: 8>>10 > 5.76  ·	Procal:0.06>>0.08   ·	Ferritin: 982>>1137 >> 1246   - Incentive spirometry at bedside, albuterol prn MDI  - ID reconsulted as per attending  - DVT prophylaxis per protocol  - supportive measures : antipyretics, antitussives  - Isolation precautions (contact, droplet, airborne)    #valvular heart disease/ Chronic Afib/ essential HTN/ Mixed HLD   - continue coumadin to maintain INR 2-3  - coumadin 2.96 today, will hold today's dose of coumadin   - daily INR  - c/w toprol 50 mg, lasix, cardiazem 240 CD daily, simvastatin 20mg daily  - cardiologist Dr. Travis    # DMII - uncontrolled  - increasing insulin to 15/6/6//6 + sliding scale   - monitor FS ACHS  - A1C with Estimated Average Glucose Result: 11.0    #GERD  - c/w  protonix     #BPH  - c/w tamsulosin     #DIET: DASH/CC  #DVTppx:   #GIppx: protonix  #Activity: Increase as tolerated  #CODE: FULL, patient was DNR /DNI resented to full code  Dispo home when stable, fully functional at baseline ELIZA POWELL 76y Male  MRN#: [id number]   Hospital Day: 1d    SUBJECTIVE  Patient is a 76y old Male who presents with a chief complaint of COVID pneumonia (15 Feb 2021 09:57)  Currently admitted to medicine with the primary diagnosis of COVID-19      INTERVAL HPI AND OVERNIGHT EVENTS:  Patient was examined and seen at bedside. This morning he is resting comfortably in bed and reports no issues or overnight events.    REVIEW OF SYMPTOMS:  CONSTITUTIONAL: + weakness, no fevers or chills; No headaches  EYES: No visual changes, eye pain, or discharge  ENT: No vertigo; No ear pain or change in hearing; No sore throat or difficulty swallowing  NECK: No pain or stiffness  RESPIRATORY: No cough, wheezing, or hemoptysis; + shortness of breath  CARDIOVASCULAR: No chest pain or palpitations  GASTROINTESTINAL: No abdominal or epigastric pain; No nausea, vomiting, or hematemesis; No diarrhea or constipation; No melena or hematochezia  GENITOURINARY: No dysuria, frequency or hematuria  MUSCULOSKELETAL: No joint pain, no muscle pain, no weakness  NEUROLOGICAL: No numbness or weakness  SKIN: No itching or rashes    OBJECTIVE  PAST MEDICAL & SURGICAL HISTORY  Gastroesophageal reflux disease    Enlarged liver    BPH (benign prostatic hyperplasia)    Essential hypertension    Heart valve disorder    History of back surgery      ALLERGIES:  No Known Allergies    MEDICATIONS:  STANDING MEDICATIONS  dexAMETHasone  Injectable 6 milliGRAM(s) IV Push daily  dextrose 40% Gel 15 Gram(s) Oral once  dextrose 5%. 1000 milliLiter(s) IV Continuous <Continuous>  dextrose 5%. 1000 milliLiter(s) IV Continuous <Continuous>  dextrose 5%. 1000 milliLiter(s) IV Continuous <Continuous>  dextrose 50% Injectable 25 Gram(s) IV Push once  dextrose 50% Injectable 12.5 Gram(s) IV Push once  dextrose 50% Injectable 25 Gram(s) IV Push once  famotidine    Tablet 20 milliGRAM(s) Oral daily  glucagon  Injectable 1 milliGRAM(s) IntraMuscular once  glucagon  Injectable 1 milliGRAM(s) IntraMuscular once  influenza   Vaccine 0.5 milliLiter(s) IntraMuscular once  insulin glargine Injectable (LANTUS) 15 Unit(s) SubCutaneous at bedtime  insulin lispro (ADMELOG) corrective regimen sliding scale   SubCutaneous three times a day before meals  insulin lispro (ADMELOG) corrective regimen sliding scale   SubCutaneous at bedtime  insulin lispro Injectable (ADMELOG) 6 Unit(s) SubCutaneous three times a day before meals  remdesivir  IVPB 100 milliGRAM(s) IV Intermittent every 24 hours  sodium zirconium cyclosilicate 5 Gram(s) Oral once    PRN MEDICATIONS  acetaminophen   Tablet .. 650 milliGRAM(s) Oral every 4 hours PRN      VITAL SIGNS: Last 24 Hours  T(C): 36.7 (16 Feb 2021 07:30), Max: 37.3 (15 Feb 2021 15:00)  T(F): 98.1 (16 Feb 2021 07:30), Max: 99.1 (15 Feb 2021 15:00)  HR: 104 (16 Feb 2021 07:30) (85 - 106)  BP: 112/73 (16 Feb 2021 07:30) (101/67 - 118/85)  BP(mean): 73 (15 Feb 2021 15:00) (73 - 73)  RR: 19 (16 Feb 2021 07:30) (19 - 22)  SpO2: 90% (16 Feb 2021 07:30) (90% - 94%)    LABS:                        15.8   9.77  )-----------( 349      ( 16 Feb 2021 04:54 )             47.6     02-16    137  |  99  |  29<H>  ----------------------------<  241<H>  5.2<H>   |  24  |  1.2    Ca    8.6      16 Feb 2021 04:54  Phos  4.1     02-16  Mg     2.1     02-16    TPro  6.6  /  Alb  3.5  /  TBili  0.5  /  DBili  x   /  AST  48<H>  /  ALT  40  /  AlkPhos  53  02-16    PT/INR - ( 16 Feb 2021 04:54 )   PT: 33.90 sec;   INR: 2.95 ratio         PTT - ( 16 Feb 2021 04:54 )  PTT:42.6 sec      Creatine Kinase, Serum: 55 U/L (02-16-21 @ 04:54)  Lactate, Blood: 2.4 mmol/L <H> (02-16-21 @ 04:54)  Troponin T, Serum: <0.01 ng/mL (02-16-21 @ 04:54)      CARDIAC MARKERS ( 16 Feb 2021 04:54 )  x     / <0.01 ng/mL / 55 U/L / x     / x      CARDIAC MARKERS ( 15 Feb 2021 04:20 )  x     / <0.01 ng/mL / x     / x     / x          RADIOLOGY:  Xray Chest 1 View- PORTABLE-Urgent (02.15.21 @ 06:59)     Impression: Stable bilateral pulmonary opacities.      PHYSICAL EXAM:  CONSTITUTIONAL: No acute distress but appears lethargic AAOx3  HEAD: Atraumatic, normocephalic  EYES: conjunctiva and sclera clear  PULMONARY: Clear to auscultation bilaterally  CARDIOVASCULAR: Regular rate and rhythm  GASTROINTESTINAL: Soft, non-tender, non-distended  MUSCULOSKELETAL:  no edema  NEUROLOGY: non-focal  SKIN: No rashes or lesions    ASSESSMENT & PLAN  76 year old Male with a past medical history of HTN, HLD, BPH, Hepatomegaly, valvular heart disease/ chronic Afib on Coumadin, presented to the ER with a chief complaint of Weakness and shortness of breath x 1 week, covid + x 1 day. Patient is being admitted for increased weakness secondary to COVID infection . Transferred back to [facility] , was dced to [facility] after plasma but p[atient became hypoxic.     #Acute hypoxemic respiratory failure secondary to COVID-19 pneumonia- worsening  - COVID-19 PCR positive  - EKG: a fib  - Chest X-ray: b/l opacities, repeat cxr  - Oxygen Therapy: High Flow ( 60L/100%) w/ BiPAP 4 hours on 4 hours off, respiratory aware   - s/p plasma (2/13)  - c/w RDV Day 2   - c/w decadron Day 3   - Inflammatory markers:   ·	 DDimer: 155>>355  >> 197   ·	CRP: 8>>10 > 5.76  ·	Procal:0.06>>0.08   ·	Ferritin: 982>>1137 >> 1246   - Incentive spirometry at bedside, albuterol prn MDI  - ID reconsulted as per attending  - DVT prophylaxis per protocol  - supportive measures : antipyretics, antitussives  - Isolation precautions (contact, droplet, airborne)    #valvular heart disease/ Chronic Afib/ essential HTN/ Mixed HLD   - continue coumadin to maintain INR 2-3  - coumadin 2.96 today, will hold today's dose of coumadin   - daily INR  - c/w toprol 50 mg, lasix, cardiazem 240 CD daily, simvastatin 20mg daily  - cardiologist Dr. Travis    # DMII - uncontrolled  - increasing insulin to 15/6/6//6 + sliding scale   - monitor FS ACHS  - A1C with Estimated Average Glucose Result: 11.0    #GERD  - c/w  protonix     #BPH  - c/w tamsulosin     #DIET: DASH/CC  #DVTppx:   #GIppx: protonix  #Activity: Increase as tolerated  #CODE: FULL, patient was DNR /DNI resented to full code  Dispo home when stable, fully functional at baseline

## 2021-02-17 NOTE — PROGRESS NOTE ADULT - SUBJECTIVE AND OBJECTIVE BOX
ELIZA POWELL 76y Male  MRN#: [id number]   Hospital Day: 2d    SUBJECTIVE  Patient is a 76y old Male who presents with a chief complaint of COVID pneumonia (15 Feb 2021 09:57)  Currently admitted to medicine with the primary diagnosis of COVID-19      INTERVAL HPI AND OVERNIGHT EVENTS:  Patient was examined and seen at bedside. This morning he is resting comfortably in bed and reports no issues or overnight events.    REVIEW OF SYMPTOMS:  CONSTITUTIONAL: No weakness, fevers or chills; No headaches  EYES: No visual changes, eye pain, or discharge  ENT: No vertigo; No ear pain or change in hearing; No sore throat or difficulty swallowing  NECK: No pain or stiffness  RESPIRATORY: No cough, wheezing, or hemoptysis; + shortness of breath  CARDIOVASCULAR: No chest pain or palpitations  GASTROINTESTINAL: No abdominal or epigastric pain; No nausea, vomiting, or hematemesis; No diarrhea or constipation; No melena or hematochezia  GENITOURINARY: No dysuria, frequency or hematuria  MUSCULOSKELETAL: No joint pain, no muscle pain, no weakness  NEUROLOGICAL: No numbness or weakness  SKIN: No itching or rashes    OBJECTIVE  PAST MEDICAL & SURGICAL HISTORY  Gastroesophageal reflux disease    Enlarged liver    BPH (benign prostatic hyperplasia)    Essential hypertension    Heart valve disorder    History of back surgery      ALLERGIES:  No Known Allergies    MEDICATIONS:  STANDING MEDICATIONS  dexAMETHasone  Injectable 6 milliGRAM(s) IV Push daily  dextrose 40% Gel 15 Gram(s) Oral once  dextrose 5%. 1000 milliLiter(s) IV Continuous <Continuous>  dextrose 5%. 1000 milliLiter(s) IV Continuous <Continuous>  dextrose 5%. 1000 milliLiter(s) IV Continuous <Continuous>  dextrose 50% Injectable 25 Gram(s) IV Push once  dextrose 50% Injectable 12.5 Gram(s) IV Push once  dextrose 50% Injectable 25 Gram(s) IV Push once  famotidine    Tablet 20 milliGRAM(s) Oral daily  glucagon  Injectable 1 milliGRAM(s) IntraMuscular once  glucagon  Injectable 1 milliGRAM(s) IntraMuscular once  influenza   Vaccine 0.5 milliLiter(s) IntraMuscular once  insulin glargine Injectable (LANTUS) 15 Unit(s) SubCutaneous at bedtime  insulin lispro (ADMELOG) corrective regimen sliding scale   SubCutaneous three times a day before meals  insulin lispro (ADMELOG) corrective regimen sliding scale   SubCutaneous at bedtime  insulin lispro Injectable (ADMELOG) 6 Unit(s) SubCutaneous three times a day before meals  remdesivir  IVPB 100 milliGRAM(s) IV Intermittent every 24 hours    PRN MEDICATIONS  acetaminophen   Tablet .. 650 milliGRAM(s) Oral every 4 hours PRN      VITAL SIGNS: Last 24 Hours  T(C): 36.8 (17 Feb 2021 08:11), Max: 36.8 (17 Feb 2021 08:11)  T(F): 98.3 (17 Feb 2021 08:11), Max: 98.3 (17 Feb 2021 08:11)  HR: 95 (17 Feb 2021 08:11) (74 - 113)  BP: 130/75 (17 Feb 2021 08:11) (118/87 - 130/75)  BP(mean): --  RR: 18 (17 Feb 2021 08:11) (18 - 20)  SpO2: 93% (17 Feb 2021 09:01) (89% - 95%)    LABS:                        15.8   9.77  )-----------( 349      ( 16 Feb 2021 04:54 )             47.6     02-16    137  |  99  |  29<H>  ----------------------------<  241<H>  5.2<H>   |  24  |  1.2    Ca    8.6      16 Feb 2021 04:54  Phos  4.1     02-16  Mg     2.1     02-16    TPro  6.6  /  Alb  3.5  /  TBili  0.5  /  DBili  x   /  AST  48<H>  /  ALT  40  /  AlkPhos  53  02-16    PT/INR - ( 17 Feb 2021 08:28 )   PT: 36.10 sec;   INR: 3.14 ratio         PTT - ( 16 Feb 2021 04:54 )  PTT:42.6 sec          CARDIAC MARKERS ( 16 Feb 2021 04:54 )  x     / <0.01 ng/mL / 55 U/L / x     / x          RADIOLOGY:      PHYSICAL EXAM:  CONSTITUTIONAL: No acute distress, well-developed, well-groomed, AAOx3  HEAD: Atraumatic, normocephalic  EYES: EOM intact, PERRLA, conjunctiva and sclera clear  PULMONARY: Clear to auscultation bilaterally  CARDIOVASCULAR: Regular rate and rhythm  GASTROINTESTINAL: Soft, non-tender, non-distended  MUSCULOSKELETAL: no edema  NEUROLOGY: non-focal  SKIN: No rashes or lesions    ASSESSMENT & PLAN  76 year old Male with a past medical history of HTN, HLD, BPH, Hepatomegaly, valvular heart disease/ chronic Afib on Coumadin, presented to the ER with a chief complaint of Weakness and shortness of breath x 1 week, covid + x 1 day. Patient is being admitted for increased weakness secondary to COVID infection . Transferred back to [facility] , was dced to [facility] after plasma but p[atient became hypoxic.     #Acute hypoxemic respiratory failure secondary to COVID-19 pneumonia- worsening  - COVID-19 PCR positive  - EKG: a fib  - Chest X-ray: b/l opacities, repeat cxr  - Oxygen Therapy: High Flow ( 60L/100%) w/ BiPAP 4 hours on 4 hours off, respiratory aware   - s/p plasma (2/13)  - c/w RDV Day 2   - c/w decadron Day 3   - Inflammatory markers:   ·	 DDimer: 155>>355  >> 197   ·	CRP: 8>>10 > 5.76  ·	Procal:0.06>>0.08   ·	Ferritin: 982>>1137 >> 1246   - Incentive spirometry at bedside, albuterol prn MDI  - ID reconsulted as per attending  - DVT prophylaxis per protocol  - supportive measures: antipyretics, antitussives  - Isolation precautions (contact, droplet, airborne)    #valvular heart disease/ Chronic Afib/ essential HTN/ Mixed HLD   - continue coumadin to maintain INR 2-3  - coumadin >3 today, will hold today's dose of coumadin   - daily INR  - c/w toprol 50 mg, lasix, cardiazem 240 CD daily, simvastatin 20mg daily  - cardiologist Dr. Travis    # DMII - uncontrolled  - increasing insulin to 15/6/6//6 + sliding scale   - monitor FS ACHS  - A1C with Estimated Average Glucose Result: 11.0    #GERD  - c/w  protonix     #BPH  - c/w tamsulosin     #DIET: DASH/CC  #DVTppx: coumadin on hold due to supratherapeutic INR   #GIppx: protonix  #Activity: Increase as tolerated  #CODE: FULL, patient was DNR /DNI resented to full code  Dispo home when stable, fully functional at baseline ELIZA POWELL 76y Male  MRN#: [id number]   Hospital Day: 2d    SUBJECTIVE  Patient is a 76y old Male who presents with a chief complaint of COVID pneumonia (15 Feb 2021 09:57)  Currently admitted to medicine with the primary diagnosis of COVID-19      INTERVAL HPI AND OVERNIGHT EVENTS:  Patient was examined and seen at bedside. This morning he is resting comfortably in bed and reports no issues or overnight events.    REVIEW OF SYMPTOMS:  CONSTITUTIONAL: No weakness, fevers or chills; No headaches  EYES: No visual changes, eye pain, or discharge  ENT: No vertigo; No ear pain or change in hearing; No sore throat or difficulty swallowing  NECK: No pain or stiffness  RESPIRATORY: No cough, wheezing, or hemoptysis; + shortness of breath  CARDIOVASCULAR: No chest pain or palpitations  GASTROINTESTINAL: No abdominal or epigastric pain; No nausea, vomiting, or hematemesis; No diarrhea or constipation; No melena or hematochezia  GENITOURINARY: No dysuria, frequency or hematuria  MUSCULOSKELETAL: No joint pain, no muscle pain, no weakness  NEUROLOGICAL: No numbness or weakness  SKIN: No itching or rashes    OBJECTIVE  PAST MEDICAL & SURGICAL HISTORY  Gastroesophageal reflux disease    Enlarged liver    BPH (benign prostatic hyperplasia)    Essential hypertension    Heart valve disorder    History of back surgery      ALLERGIES:  No Known Allergies    MEDICATIONS:  STANDING MEDICATIONS  dexAMETHasone  Injectable 6 milliGRAM(s) IV Push daily  dextrose 40% Gel 15 Gram(s) Oral once  dextrose 5%. 1000 milliLiter(s) IV Continuous <Continuous>  dextrose 5%. 1000 milliLiter(s) IV Continuous <Continuous>  dextrose 5%. 1000 milliLiter(s) IV Continuous <Continuous>  dextrose 50% Injectable 25 Gram(s) IV Push once  dextrose 50% Injectable 12.5 Gram(s) IV Push once  dextrose 50% Injectable 25 Gram(s) IV Push once  famotidine    Tablet 20 milliGRAM(s) Oral daily  glucagon  Injectable 1 milliGRAM(s) IntraMuscular once  glucagon  Injectable 1 milliGRAM(s) IntraMuscular once  influenza   Vaccine 0.5 milliLiter(s) IntraMuscular once  insulin glargine Injectable (LANTUS) 15 Unit(s) SubCutaneous at bedtime  insulin lispro (ADMELOG) corrective regimen sliding scale   SubCutaneous three times a day before meals  insulin lispro (ADMELOG) corrective regimen sliding scale   SubCutaneous at bedtime  insulin lispro Injectable (ADMELOG) 6 Unit(s) SubCutaneous three times a day before meals  remdesivir  IVPB 100 milliGRAM(s) IV Intermittent every 24 hours    PRN MEDICATIONS  acetaminophen   Tablet .. 650 milliGRAM(s) Oral every 4 hours PRN      VITAL SIGNS: Last 24 Hours  T(C): 36.8 (17 Feb 2021 08:11), Max: 36.8 (17 Feb 2021 08:11)  T(F): 98.3 (17 Feb 2021 08:11), Max: 98.3 (17 Feb 2021 08:11)  HR: 95 (17 Feb 2021 08:11) (74 - 113)  BP: 130/75 (17 Feb 2021 08:11) (118/87 - 130/75)  BP(mean): --  RR: 18 (17 Feb 2021 08:11) (18 - 20)  SpO2: 93% (17 Feb 2021 09:01) (89% - 95%)    LABS:                        15.8   9.77  )-----------( 349      ( 16 Feb 2021 04:54 )             47.6     02-16    137  |  99  |  29<H>  ----------------------------<  241<H>  5.2<H>   |  24  |  1.2    Ca    8.6      16 Feb 2021 04:54  Phos  4.1     02-16  Mg     2.1     02-16    TPro  6.6  /  Alb  3.5  /  TBili  0.5  /  DBili  x   /  AST  48<H>  /  ALT  40  /  AlkPhos  53  02-16    PT/INR - ( 17 Feb 2021 08:28 )   PT: 36.10 sec;   INR: 3.14 ratio         PTT - ( 16 Feb 2021 04:54 )  PTT:42.6 sec          CARDIAC MARKERS ( 16 Feb 2021 04:54 )  x     / <0.01 ng/mL / 55 U/L / x     / x          RADIOLOGY:      PHYSICAL EXAM:  CONSTITUTIONAL: No acute distress, well-developed, well-groomed, AAOx3  HEAD: Atraumatic, normocephalic  EYES: EOM intact, PERRLA, conjunctiva and sclera clear  PULMONARY: Clear to auscultation bilaterally  CARDIOVASCULAR: Regular rate and rhythm  GASTROINTESTINAL: Soft, non-tender, non-distended  MUSCULOSKELETAL: no edema  NEUROLOGY: non-focal  SKIN: No rashes or lesions    ASSESSMENT & PLAN  76 year old Male with a past medical history of HTN, HLD, BPH, Hepatomegaly, valvular heart disease/ chronic Afib on Coumadin, presented to the ER with a chief complaint of Weakness and shortness of breath x 1 week, covid + x 1 day. Patient is being admitted for increased weakness secondary to COVID infection . Transferred back to [facility] , was dced to [facility] after plasma but p[atient became hypoxic.     #Acute hypoxemic respiratory failure secondary to COVID-19 pneumonia- worsening  - COVID-19 PCR positive  - EKG: a fib  - Chest X-ray: b/l opacities, repeat cxr  - Oxygen Therapy: High Flow ( 60L/100%) w/ BiPAP 4 hours on 4 hours off, respiratory aware   - s/p plasma (2/13)  - c/w RDV Day 2   - c/w decadron Day 3   - Inflammatory markers:   ·	 DDimer: 155>>355  >> 197   ·	CRP: 8>>10 > 5.76  ·	Procal:0.06>>0.08   ·	Ferritin: 982>>1137 >> 1246   - Incentive spirometry at bedside, albuterol prn MDI  - ID reconsulted as per attending  - DVT prophylaxis per protocol  - supportive measures: antipyretics, antitussives  - Isolation precautions (contact, droplet, airborne)    #valvular heart disease/ Chronic Afib/ essential HTN/ Mixed HLD   - continue coumadin to maintain INR 2-3  - coumadin >3 today, will hold today's dose of coumadin   - daily INR  - c/w Metorpolol succinate 50 mg, lasix 40 mg daily, [id number] CD daily, simvastatin 20mg daily  - cardiologist Dr. Travis    # DMII - uncontrolled  - increasing insulin to 18/8/8/8 + sliding scale (premeal & qhs)   - monitor FS ACHS  - A1C with Estimated Average Glucose Result: 11.0    #GERD  - c/w  protonix     #BPH  - c/w tamsulosin     #DIET: DASH/CC  #DVTppx: coumadin on hold due to supratherapeutic INR   #GIppx: protonix  #Activity: Increase as tolerated  #CODE: FULL, patient was DNR /DNI resented to full code  Dispo home when stable, fully functional at baseline

## 2021-02-17 NOTE — PROGRESS NOTE ADULT - ATTENDING COMMENTS
Patient was seen independently. Latest vital signs and labs were reviewed. Case was discussed with house staff. Agree with resident's assessment and plan with following additions/modifications.     ASSESSMENT & PLAN:  Acute hypoxic respiratory failure secondary to Covid 19 pneumonia  with critical illness  Currently on  BIPAP alternating with HFNC  Low PCT. Abx not warranted  Unspecified valvular heart disease  Chronic Afib-on coumadin. held today because of elevated INR  HTN- well controlled  DMII with hyperglycemia - sliding scale . Hold metformin. Increased Lantus to 18 & lispro to 8 U with meals  GERD - protonix   BPH - tamsulosin   Handoff: Acute medical management needed for now , requiring HFNC        Continue current medical management for active chronic comorbid conditions.  DVT Prophylaxis as appropriate  Supportive care including activity, diet, goals of care discussed in AM rounds.  Discussed with  / in AM rounds  Handoff: Patient was seen independently. Latest vital signs and labs were reviewed. Case was discussed with house staff. Agree with resident's assessment and plan with following additions/modifications.     ASSESSMENT & PLAN:  Acute hypoxic respiratory failure secondary to Covid 19 pneumonia  with critical illness  Currently on  BIPAP alternating with HFNC  Low PCT. Abx not warranted  Unspecified valvular heart disease  Chronic Afib-on coumadin. held today because of elevated INR. - Continue with  Toprol 50 mg, Lasix, Cardizem 240 CD daily, simvastatin 20mg daily  HTN- well controlled  DMII with hyperglycemia - sliding scale . Hold metformin. Increased Lantus to 18 & lispro to 8 U with meals  GERD - protonix   BPH - tamsulosin   Handoff: Acute medical management needed for now , requiring HFNC        Continue current medical management for active chronic comorbid conditions.  DVT Prophylaxis as appropriate  Supportive care including activity, diet, goals of care discussed in AM rounds.  Discussed with  / in AM rounds  Handoff:

## 2021-02-17 NOTE — CHART NOTE - NSCHARTNOTEFT_GEN_A_CORE
Attempted to reach patient's spouse at [phone] for clinical status update, but unable to reach. Voicemail left.

## 2021-02-17 NOTE — PROGRESS NOTE ADULT - ASSESSMENT
· Assessment	  76 year old Male with a past medical history of HTN, HLD, BPH, Hepatomegaly, "Heart valve disorder" ?Afib on Coumadin, presented to the ER with a chief complaint of Weakness and shortness of breath x 1 week, covid + x 1 day. Patient is being admitted for increased weakness secondary to COVID infection     IMPRESSION;  COVID 19 with severe illness. SpO2 < 94% on RA and need for supplemental O2.   Pt was  in the early viral replicative phase based on the timeline/onset of symptoms.   procalcitonin 0.08  , not suggestive of a bacterial PNA.  Ferritin 1246  CRP 7.70  Ddimers 197    s/p RDV   s/p plasma    RECOMMENDATIONS;  Target SpO2 92 % to 96 %  Dexamethasone 6 mg iv q24h for 10 days. since 2/13  Monitor for side effects: hyperglycemia, neurological ( agitation/confusion), adrenal suppression, bacterial and fungal infections  Anticoagulation as per team

## 2021-02-17 NOTE — PROGRESS NOTE ADULT - SUBJECTIVE AND OBJECTIVE BOX
ELIZA POWELL  76y, Male    All available historical data reviewed    OVERNIGHT EVENTS:  no tiana  HFNC    ROS:  General: Denies rigors, nightsweats  HEENT: Denies headache, rhinorrhea, sore throat, eye pain  CV: Denies CP, palpitations  PULM: Denies wheezing, hemoptysis  GI: Denies hematemesis, hematochezia, melena  : Denies discharge, hematuria  MSK: Denies arthralgias, myalgias  SKIN: Denies rash, lesions  NEURO: Denies paresthesias, weakness  PSYCH: Denies depression, anxiety    VITALS:  T(F): 97.5, Max: 98.3 (02-17-21 @ 08:11)  HR: 115  BP: 134/94  RR: 18Vital Signs Last 24 Hrs  T(C): 36.4 (17 Feb 2021 12:15), Max: 36.8 (17 Feb 2021 08:11)  T(F): 97.5 (17 Feb 2021 12:15), Max: 98.3 (17 Feb 2021 08:11)  HR: 115 (17 Feb 2021 12:15) (74 - 115)  BP: 134/94 (17 Feb 2021 12:15) (118/87 - 134/94)  BP(mean): --  RR: 18 (17 Feb 2021 12:15) (18 - 20)  SpO2: 93% (17 Feb 2021 12:15) (89% - 93%)    TESTS & MEASUREMENTS:                        15.8   9.77  )-----------( 349      ( 16 Feb 2021 04:54 )             47.6     02-16    137  |  99  |  29<H>  ----------------------------<  241<H>  5.2<H>   |  24  |  1.2    Ca    8.6      16 Feb 2021 04:54  Phos  4.1     02-16  Mg     2.1     02-16    TPro  6.6  /  Alb  3.5  /  TBili  0.5  /  DBili  x   /  AST  48<H>  /  ALT  40  /  AlkPhos  53  02-16    LIVER FUNCTIONS - ( 16 Feb 2021 04:54 )  Alb: 3.5 g/dL / Pro: 6.6 g/dL / ALK PHOS: 53 U/L / ALT: 40 U/L / AST: 48 U/L / GGT: x                   RADIOLOGY & ADDITIONAL TESTS:  Personal review of radiological diagnostics performed  Echo and EKG results noted when applicable.     MEDICATIONS:  acetaminophen   Tablet .. 650 milliGRAM(s) Oral every 4 hours PRN  dexAMETHasone  Injectable 6 milliGRAM(s) IV Push daily  diltiazem    milliGRAM(s) Oral daily  furosemide    Tablet 40 milliGRAM(s) Oral daily  influenza   Vaccine 0.5 milliLiter(s) IntraMuscular once  insulin glargine Injectable (LANTUS) 18 Unit(s) SubCutaneous at bedtime  insulin lispro (ADMELOG) corrective regimen sliding scale   SubCutaneous three times a day before meals  insulin lispro (ADMELOG) corrective regimen sliding scale   SubCutaneous at bedtime  insulin lispro Injectable (ADMELOG) 8 Unit(s) SubCutaneous three times a day before meals  metoprolol succinate ER 50 milliGRAM(s) Oral daily  pantoprazole    Tablet 40 milliGRAM(s) Oral before breakfast  remdesivir  IVPB 100 milliGRAM(s) IV Intermittent every 24 hours  simvastatin 20 milliGRAM(s) Oral at bedtime  tamsulosin 0.4 milliGRAM(s) Oral at bedtime      ANTIBIOTICS:  remdesivir  IVPB 100 milliGRAM(s) IV Intermittent every 24 hours

## 2021-02-18 NOTE — PROGRESS NOTE ADULT - ATTENDING COMMENTS
Patient seen and examined independently. Agree with resident note.  # Ac hypoxic resp failure sec to covid pNA-- patient on high flow 60 L fio2is 100%-- on dexa day 3  RDV given for 3 days only--transaminitis ?  # MVR on coumadin-- inr supratherapeutic-- on hold currently  #DM 2 on insulin.   still acute and needs more time for clinical improvement. Patient seen and examined independently. Agree with resident note.  # Ac hypoxic resp failure sec to covid pNA-- patient on high flow 60 L fio2is 100%-- on dexa day 3  RDV given for 3 days only--transaminitis ?  # a fib chr on coumadin-- inr supratherapeutic-- on hold currently  #DM 2 on insulin.   still acute and needs more time for clinical improvement.

## 2021-02-18 NOTE — PROGRESS NOTE ADULT - ASSESSMENT
· Assessment	  76 year old Male with a past medical history of HTN, HLD, BPH, Hepatomegaly, "Heart valve disorder" ?Afib on Coumadin, presented to the ER with a chief complaint of Weakness and shortness of breath x 1 week, covid + x 1 day. Patient is being admitted for increased weakness secondary to COVID infection     IMPRESSION;  COVID 19 with severe illness. SpO2 < 94% on RA and need for supplemental O2.   Pt was  in the early viral replicative phase based on the timeline/onset of symptoms.   COVID-19 antibodies NG  procalcitonin 0.08  , not suggestive of a bacterial PNA.  Ferritin 1246>1231  CRP 7.70 >5.9  Ddimers 197    s/p RDV   s/p plasma    RECOMMENDATIONS;  Target SpO2 92 % to 96 %  Dexamethasone 6 mg iv q24h for 10 days. since 2/13  Monitor for side effects: hyperglycemia, neurological ( agitation/confusion), adrenal suppression, bacterial and fungal infections  Anticoagulation as per team

## 2021-02-18 NOTE — PROGRESS NOTE ADULT - SUBJECTIVE AND OBJECTIVE BOX
ELIZA POWELL  76y, Male    All available historical data reviewed    OVERNIGHT EVENTS:  no fevers  HFNC    ROS:  General: Denies rigors, nightsweats  HEENT: Denies headache, rhinorrhea, sore throat, eye pain  CV: Denies CP, palpitations  PULM: Denies wheezing, hemoptysis  GI: Denies hematemesis, hematochezia, melena  : Denies discharge, hematuria  MSK: Denies arthralgias, myalgias  SKIN: Denies rash, lesions  NEURO: Denies paresthesias, weakness  PSYCH: Denies depression, anxiety    VITALS:  T(F): 97.8, Max: 99.8 (02-17-21 @ 21:58)  HR: 60  BP: 163/97  RR: 18Vital Signs Last 24 Hrs  T(C): 36.6 (18 Feb 2021 07:30), Max: 37.7 (17 Feb 2021 21:58)  T(F): 97.8 (18 Feb 2021 07:30), Max: 99.8 (17 Feb 2021 21:58)  HR: 60 (18 Feb 2021 07:30) (60 - 115)  BP: 163/97 (18 Feb 2021 07:30) (134/94 - 163/97)  BP(mean): --  RR: 18 (18 Feb 2021 07:30) (18 - 18)  SpO2: 91% (18 Feb 2021 07:30) (91% - 98%)    TESTS & MEASUREMENTS:                        18.1   13.65 )-----------( 294      ( 18 Feb 2021 05:33 )             55.3     02-18    140  |  101  |  27<H>  ----------------------------<  160<H>  4.8   |  22  |  1.0    Ca    9.0      18 Feb 2021 05:33    TPro  7.1  /  Alb  3.6  /  TBili  0.9  /  DBili  x   /  AST  56<H>  /  ALT  41  /  AlkPhos  94  02-18    LIVER FUNCTIONS - ( 18 Feb 2021 05:33 )  Alb: 3.6 g/dL / Pro: 7.1 g/dL / ALK PHOS: 94 U/L / ALT: 41 U/L / AST: 56 U/L / GGT: x                   RADIOLOGY & ADDITIONAL TESTS:  Personal review of radiological diagnostics performed  Echo and EKG results noted when applicable.     MEDICATIONS:  acetaminophen   Tablet .. 650 milliGRAM(s) Oral every 4 hours PRN  dexAMETHasone  Injectable 6 milliGRAM(s) IV Push daily  diltiazem    milliGRAM(s) Oral daily  furosemide    Tablet 40 milliGRAM(s) Oral daily  influenza   Vaccine 0.5 milliLiter(s) IntraMuscular once  insulin glargine Injectable (LANTUS) 18 Unit(s) SubCutaneous at bedtime  insulin lispro (ADMELOG) corrective regimen sliding scale   SubCutaneous three times a day before meals  insulin lispro (ADMELOG) corrective regimen sliding scale   SubCutaneous at bedtime  insulin lispro Injectable (ADMELOG) 8 Unit(s) SubCutaneous three times a day before meals  metoprolol succinate ER 50 milliGRAM(s) Oral daily  pantoprazole    Tablet 40 milliGRAM(s) Oral before breakfast  simvastatin 20 milliGRAM(s) Oral at bedtime  tamsulosin 0.4 milliGRAM(s) Oral at bedtime      ANTIBIOTICS:

## 2021-02-18 NOTE — PROGRESS NOTE ADULT - SUBJECTIVE AND OBJECTIVE BOX
ELIZA POWELL 76y Male  MRN#: [id number]   Hospital Day: 3d    SUBJECTIVE  Patient is a 76y old Male who presents with a chief complaint of COVID pneumonia (15 Feb 2021 09:57)  Currently admitted to medicine with the primary diagnosis of COVID-19      INTERVAL HPI AND OVERNIGHT EVENTS:  Patient was examined and seen at bedside. This morning he is resting comfortably in bed and reports no issues or overnight events.    REVIEW OF SYMPTOMS:  CONSTITUTIONAL: No weakness, fevers or chills; No headaches  EYES: No visual changes, eye pain, or discharge  ENT: No vertigo; No ear pain or change in hearing; No sore throat or difficulty swallowing  NECK: No pain or stiffness  RESPIRATORY: No cough, wheezing, or hemoptysis; + shortness of breath  CARDIOVASCULAR: No chest pain or palpitations  GASTROINTESTINAL: No abdominal or epigastric pain; No nausea, vomiting, or hematemesis; No diarrhea or constipation; No melena or hematochezia  GENITOURINARY: No dysuria, frequency or hematuria  MUSCULOSKELETAL: No joint pain, no muscle pain, no weakness  NEUROLOGICAL: No numbness or weakness  SKIN: No itching or rashes    OBJECTIVE  PAST MEDICAL & SURGICAL HISTORY  Gastroesophageal reflux disease    Enlarged liver    BPH (benign prostatic hyperplasia)    Essential hypertension    Heart valve disorder    History of back surgery      ALLERGIES:  No Known Allergies    MEDICATIONS:  STANDING MEDICATIONS  dexAMETHasone  Injectable 6 milliGRAM(s) IV Push daily  diltiazem    milliGRAM(s) Oral daily  furosemide    Tablet 40 milliGRAM(s) Oral daily  influenza   Vaccine 0.5 milliLiter(s) IntraMuscular once  insulin glargine Injectable (LANTUS) 18 Unit(s) SubCutaneous at bedtime  insulin lispro (ADMELOG) corrective regimen sliding scale   SubCutaneous three times a day before meals  insulin lispro (ADMELOG) corrective regimen sliding scale   SubCutaneous at bedtime  insulin lispro Injectable (ADMELOG) 8 Unit(s) SubCutaneous three times a day before meals  metoprolol succinate ER 50 milliGRAM(s) Oral daily  pantoprazole    Tablet 40 milliGRAM(s) Oral before breakfast  simvastatin 20 milliGRAM(s) Oral at bedtime  tamsulosin 0.4 milliGRAM(s) Oral at bedtime    PRN MEDICATIONS  acetaminophen   Tablet .. 650 milliGRAM(s) Oral every 4 hours PRN      VITAL SIGNS: Last 24 Hours  T(C): 36.6 (18 Feb 2021 07:30), Max: 37.7 (17 Feb 2021 21:58)  T(F): 97.8 (18 Feb 2021 07:30), Max: 99.8 (17 Feb 2021 21:58)  HR: 60 (18 Feb 2021 07:30) (60 - 115)  BP: 163/97 (18 Feb 2021 07:30) (134/94 - 163/97)  BP(mean): --  RR: 18 (18 Feb 2021 07:30) (18 - 18)  SpO2: 91% (18 Feb 2021 07:30) (91% - 98%)    LABS:                        18.1   13.65 )-----------( 294      ( 18 Feb 2021 05:33 )             55.3     02-18    140  |  101  |  27<H>  ----------------------------<  160<H>  4.8   |  22  |  1.0    Ca    9.0      18 Feb 2021 05:33    TPro  7.1  /  Alb  3.6  /  TBili  0.9  /  DBili  x   /  AST  56<H>  /  ALT  41  /  AlkPhos  94  02-18    PT/INR - ( 18 Feb 2021 05:33 )   PT: 36.50 sec;   INR: 3.17 ratio         PTT - ( 18 Feb 2021 05:33 )  PTT:46.4 sec          PHYSICAL EXAM:  CONSTITUTIONAL: No acute distress, well-developed, well-groomed, AAOx3  HEAD: Atraumatic, normocephalic  EYES: EOM intact, PERRLA, conjunctiva and sclera clear  PULMONARY: Clear to auscultation bilaterally  CARDIOVASCULAR: Regular rate and rhythm  GASTROINTESTINAL: Soft, non-tender, non-distended  MUSCULOSKELETAL: no edema  NEUROLOGY: non-focal  SKIN: No rashes or lesions      ASSESSMENT & PLAN  76 year old Male with a past medical history of HTN, HLD, BPH, Hepatomegaly, valvular heart disease/ chronic Afib on Coumadin, presented to the ER with a chief complaint of Weakness and shortness of breath x 1 week, covid + x 1 day. Patient is being admitted for increased weakness secondary to COVID infection . Transferred back to [facility] , [place] to [place] but p[atient became hypoxic.     #Acute hypoxemic respiratory failure secondary to COVID-19 pneumonia- worsening  - COVID-19 PCR positive  - EKG: a fib  - Chest X-ray: b/l opacities, repeat cxr  - Oxygen Therapy: High Flow ( 60L/100%) w/ BiPAP 4 hours on 4 hours off, respiratory aware   - s/p plasma (2/13)  - c/w RDV Day 4  - c/w decadron Day 5  - Inflammatory markers:   ·	 DDimer: 155>>355  >> 197   ·	CRP: 8>>10 > 5.76  ·	Procal:0.06>>0.08   ·	Ferritin: 982>>1137 >> 1246   - Incentive spirometry at bedside, albuterol prn MDI  - DVT prophylaxis per protocol  - supportive measures: antipyretics, antitussives  - Isolation precautions (contact, droplet, airborne)    #valvular heart disease/ Chronic Afib/ essential HTN/ Mixed HLD   - continue coumadin to maintain INR 2-3  - coumadin >3 today, will hold today's dose of coumadin   - daily INR  - c/w Metorpolol succinate 50 mg, lasix 40 mg daily, [id number] CD daily, simvastatin 20mg daily  - cardiologist Dr. Travis    # DMII - uncontrolled  - increasing insulin to 18/8/8/8 + sliding scale (premeal & qhs)   - monitor FS ACHS  - A1C with Estimated Average Glucose Result: 11.0    #GERD  - c/w  protonix     #BPH  - c/w tamsulosin     #DIET: DASH/CC  #DVTppx: coumadin on hold due to supratherapeutic INR   #GIppx: protonix  #Activity: Increase as tolerated  #CODE: FULL, patient was DNR /DNI resented to full code  Dispo home when stable, fully functional at baseline

## 2021-02-19 NOTE — PROGRESS NOTE ADULT - ASSESSMENT
# Ac hypoxic resp failure sec to covid pNA-- patient on high flow 60 L fio2is 100%-- changed to Bipap with stable CXR but still desaturating and tachycardic   Diagnosed on 2/11 on dexa day 4  RDV completed course-- [place] dose was [place]  #  chr a fib on coumadin-- inr supratherapeutic-- on hold currently-- rate not controlled-- was given-- cardizem CD 240mg and metoprolol 50mg daily-- needed iv push of cardizem for rate control. His cardiologist is Dr Hamilton/ [facility]  #DM 2 on insulin.   # hx of nasal sinus congestion worsened by high flow oxygen-- on normal saline drops.  PATIENT HAS NO HX OF ANY VALVE SURGERY-- CONFORMED BY HIM.  patient transferred to telemetry -- worsening desaturation and HR uncontrolled.

## 2021-02-19 NOTE — CONSULT NOTE ADULT - SUBJECTIVE AND OBJECTIVE BOX
Patient is a 76y old  Male who presents with a chief complaint of COVID pneumonia (15 Feb 2021 09:57)      HPI:  76M with a pmhx of HTN, HLD, BPH, SMILEY (noncompliant with CPAP), and afib (on coumadin, cardizem) presenting with shortness of breath. [place] to [facility] for covid pneumonia today, desatted to 80s on 6l NC, required nonrebreather mask and was transferred back to [place] ED. Pt denies chest pain, abdominal pain, N/V/D, fevers/chills.    PAST MEDICAL & SURGICAL HISTORY:  Gastroesophageal reflux disease    Enlarged liver    BPH (benign prostatic hyperplasia)    Essential hypertension    Heart valve disorder    History of back surgery        SOCIAL HX:   Smoking      never                   ETOH         denies                   Other denies illicit drug use, from home, AOx4, denies travel outside country in past 6 months, retired, worked as     FAMILY HISTORY:  Family history of esophageal cancer    :  No known cardiovascular family history     Review Of Systems:     All ROS are negative except per HPI       Allergies    No Known Allergies    Intolerances          PHYSICAL EXAM    ICU Vital Signs Last 24 Hrs  T(C): 37.3 (19 Feb 2021 09:14), Max: 37.3 (19 Feb 2021 09:14)  T(F): 99.2 (19 Feb 2021 09:14), Max: 99.2 (19 Feb 2021 09:14)  HR: 74 (19 Feb 2021 09:14) (65 - 88)  BP: 112/76 (19 Feb 2021 09:14) (112/76 - 156/99)  RR: 25 (19 Feb 2021 09:14) (18 - 25)  SpO2: 98% (19 Feb 2021 09:43) (94% - 100%)      CONSTITUTIONAL:  Obese. Well nourished.  NAD    ENT:   Airway patent,   Mouth with normal mucosa.   No thrush    EYES:   pupils equal,   round and reactive to light.    CARDIAC:   Normal rate,   Regular rhythm.    Heart sounds S1, S2.   No edema    Vascular:   normal systolic impulse  no bruits    RESPIRATORY:   AVAPS  No wheezing   Normal chest expansion  No use of accessory muscles    GASTROINTESTINAL:  Abdomen soft   Non-tender,   No guarding,   + BS    MUSCULOSKELETAL:   Range of motion is not limited,  No clubbing, cyanosis    NEUROLOGICAL:   AOx4  Follows commands  Moves all extremities  Alert and oriented   No motor or sensory deficits.  Pertinent DTRs normal    SKIN:   Skin normal color for race,   Warm and dry  No evidence of rash.    PSYCHIATRIC:   Normal mood and affect.   No apparent risk to self or others.        LABS:                          17.0   16.03 )-----------( 155      ( 19 Feb 2021 06:05 )             51.8                                               02-19    144  |  107  |  38<H>  ----------------------------<  184<H>  4.8   |  20  |  1.1    Ca    8.6      19 Feb 2021 06:05    TPro  6.5  /  Alb  2.9<L>  /  TBili  1.0  /  DBili  x   /  AST  43<H>  /  ALT  28  /  AlkPhos  116<H>  02-19      PT/INR - ( 19 Feb 2021 06:05 )   PT: 39.70 sec;   INR: 3.45 ratio         PTT - ( 19 Feb 2021 06:05 )  PTT:45.9 sec                                                                                     LIVER FUNCTIONS - ( 19 Feb 2021 06:05 )  Alb: 2.9 g/dL / Pro: 6.5 g/dL / ALK PHOS: 116 U/L / ALT: 28 U/L / AST: 43 U/L / GGT: x                                                                                                                                       X-Rays reviewed:                                                                                    ECHO not done    CXR interpreted by me: bilateral interstitial opacities    MEDICATIONS  (STANDING):  dexAMETHasone  Injectable 6 milliGRAM(s) IV Push daily  diltiazem    milliGRAM(s) Oral daily  furosemide    Tablet 40 milliGRAM(s) Oral daily  influenza   Vaccine 0.5 milliLiter(s) IntraMuscular once  insulin glargine Injectable (LANTUS) 18 Unit(s) SubCutaneous at bedtime  insulin lispro (ADMELOG) corrective regimen sliding scale   SubCutaneous three times a day before meals  insulin lispro (ADMELOG) corrective regimen sliding scale   SubCutaneous at bedtime  insulin lispro Injectable (ADMELOG) 8 Unit(s) SubCutaneous three times a day before meals  metoprolol succinate ER 50 milliGRAM(s) Oral daily  pantoprazole    Tablet 40 milliGRAM(s) Oral before breakfast  simvastatin 20 milliGRAM(s) Oral at bedtime  tamsulosin 0.4 milliGRAM(s) Oral at bedtime    MEDICATIONS  (PRN):  acetaminophen   Tablet .. 650 milliGRAM(s) Oral every 4 hours PRN Temp greater or equal to 38.5C (101.3F)

## 2021-02-19 NOTE — CONSULT NOTE ADULT - ASSESSMENT
IMPRESSION:  Acute hypoxemic respiratory failure  COVID-19 PNA  HO SMILEY, noncompliant with CPAP  HO Afib, on coumadin at home    PLAN:    CNS: Avoid CNS depressants.     HEENT: Oral care    PULMONARY:  HOB @ 45 degrees.  Aspiration precautions. AVAPS 4hrs on/off/hs/PRN. TV-450, EPAP-10, minIPAP-14, maxIPAP-25, RR-14. Alternate with HHFNC. Start at 60LPM-100%. Target SpO2>94%, wean oxygen as tolerated. Continue with decadron, D#7.     CARDIOVASCULAR: Avoid volume overload. Repeat BNP. Rate control.     GI: GI prophylaxis.  Feeding as tolerated when off AVAPS.  Bowel regimen     RENAL:  Follow up lytes.  Correct as needed.     INFECTIOUS DISEASE: PanCx. Repeat procal. Check nasal MRSA. Check urine strep/ legionella. Trend inflammatory markers. FU ID.     HEMATOLOGICAL:  DVT prophylaxis. Repeat d-dimer. Check venous duplex.     ENDOCRINE:  Follow up FS.  Insulin protocol if needed.    MUSCULOSKELETAL: bedrest    Overall poor prognosis    Step Down Unit monitoring for now

## 2021-02-19 NOTE — PROGRESS NOTE ADULT - SUBJECTIVE AND OBJECTIVE BOX
ELIZA POWELL  76y, Male    All available historical data reviewed    OVERNIGHT EVENTS:  on BIPAP  SOB, minimal cough    ROS:  General: Denies rigors, nightsweats  HEENT: Denies headache, rhinorrhea, sore throat, eye pain  CV: Denies CP, palpitations  PULM: Denies wheezing, hemoptysis  GI: Denies hematemesis, hematochezia, melena  : Denies discharge, hematuria  MSK: Denies arthralgias, myalgias  SKIN: Denies rash, lesions  NEURO: Denies paresthesias, weakness  PSYCH: Denies depression, anxiety    VITALS:  T(F): 99.2, Max: 99.2 (02-19-21 @ 09:14)  HR: 74  BP: 112/76  RR: 25Vital Signs Last 24 Hrs  T(C): 37.3 (19 Feb 2021 09:14), Max: 37.3 (19 Feb 2021 09:14)  T(F): 99.2 (19 Feb 2021 09:14), Max: 99.2 (19 Feb 2021 09:14)  HR: 74 (19 Feb 2021 09:14) (65 - 88)  BP: 112/76 (19 Feb 2021 09:14) (112/76 - 156/99)  BP(mean): --  RR: 25 (19 Feb 2021 09:14) (18 - 25)  SpO2: 100% (19 Feb 2021 09:14) (94% - 100%)    TESTS & MEASUREMENTS:                        17.0   16.03 )-----------( 155      ( 19 Feb 2021 06:05 )             51.8     02-19    144  |  107  |  38<H>  ----------------------------<  184<H>  4.8   |  20  |  1.1    Ca    8.6      19 Feb 2021 06:05    TPro  6.5  /  Alb  2.9<L>  /  TBili  1.0  /  DBili  x   /  AST  43<H>  /  ALT  28  /  AlkPhos  116<H>  02-19    LIVER FUNCTIONS - ( 19 Feb 2021 06:05 )  Alb: 2.9 g/dL / Pro: 6.5 g/dL / ALK PHOS: 116 U/L / ALT: 28 U/L / AST: 43 U/L / GGT: x                   RADIOLOGY & ADDITIONAL TESTS:  Personal review of radiological diagnostics performed  Echo and EKG results noted when applicable.     MEDICATIONS:  acetaminophen   Tablet .. 650 milliGRAM(s) Oral every 4 hours PRN  dexAMETHasone  Injectable 6 milliGRAM(s) IV Push daily  diltiazem    milliGRAM(s) Oral daily  furosemide    Tablet 40 milliGRAM(s) Oral daily  influenza   Vaccine 0.5 milliLiter(s) IntraMuscular once  insulin glargine Injectable (LANTUS) 18 Unit(s) SubCutaneous at bedtime  insulin lispro (ADMELOG) corrective regimen sliding scale   SubCutaneous three times a day before meals  insulin lispro (ADMELOG) corrective regimen sliding scale   SubCutaneous at bedtime  insulin lispro Injectable (ADMELOG) 8 Unit(s) SubCutaneous three times a day before meals  metoprolol succinate ER 50 milliGRAM(s) Oral daily  pantoprazole    Tablet 40 milliGRAM(s) Oral before breakfast  simvastatin 20 milliGRAM(s) Oral at bedtime  tamsulosin 0.4 milliGRAM(s) Oral at bedtime      ANTIBIOTICS:

## 2021-02-19 NOTE — PROGRESS NOTE ADULT - ASSESSMENT
· Assessment	  76 year old Male with a past medical history of HTN, HLD, BPH, Hepatomegaly, "Heart valve disorder" ?Afib on Coumadin, presented to the ER with a chief complaint of Weakness and shortness of breath x 1 week, covid + x 1 day. Patient is being admitted for increased weakness secondary to COVID infection     IMPRESSION;  COVID 19 with severe illness. SpO2 < 94% on RA and need for supplemental O2. BIPAP  Pt was  in the early viral replicative phase based on the timeline/onset of symptoms.   COVID-19 antibodies NG  procalcitonin 0.08  , not suggestive of a bacterial PNA.  Ferritin 1246>1231  CRP 7.70 >5.9  Ddimers 197    s/p RDV   s/p plasma    RECOMMENDATIONS;  Target SpO2 92 % to 96 %  Dexamethasone 6 mg iv q24h for 10 days. since 2/13  Monitor for side effects: hyperglycemia, neurological ( agitation/confusion), adrenal suppression, bacterial and fungal infections  Anticoagulation as per team

## 2021-02-19 NOTE — CHART NOTE - NSCHARTNOTEFT_GEN_A_CORE
Transfer Note: Medical Floor to Intensive Care     Transfer from: Medical Floor    Transfer to: Step Down     Accepting Physician: Dr. Olivarez     Signout given to: _______    HPI / HOSPITAL COURSE:    Patient is a 76 year old Male with a past medical history of HTN, HLD, BPH, Hepatomegaly, "Heart valve disorder" ?Afib on Coumadin, presented to the ER with a chief complaint of Weakness and shortness of breath x 1 week. Patient was diagnosed with COVID x 1 day ago () by a visiting nurse. Patient admits to sinus congestion with headaches. Patient admits to decreased mobility and lack of support since both him and his spouse is COVID+, chills. Patient denies fevers (Tmax 100.0), nausea, vomiting, chest pain, abdominal pain, dysuria, constipation, diarrhea, new onset of leg swelling. In the ER, patient desaturated to low 80s after taking 10 steps. Patient follows up with coumadin clinic [id number]. Next appointment is in .       Vital Signs Last 24 Hrs  T(C): 37.3 (2021 09:14), Max: 37.3 (2021 09:14)  T(F): 99.2 (2021 09:14), Max: 99.2 (2021 09:14)  HR: 74 (2021 09:14) (65 - 88)  BP: 112/76 (2021 09:14) (112/76 - 156/99)  BP(mean): --  RR: 25 (2021 09:14) (18 - 25)  SpO2: 98% (2021 09:43) (94% - 100%)    I&O's Summary      Physical Exam:   General: NAD, awake and alert  Cardiac: RRR, no murmur, no rub, no gallop  Respiratory: Good air exchange bilaterally, no wheezes, no crackles, tachypneic   GI: Abdomen nondistended, nontender, bowel sounds present  Neuro: AAOx3, no tremors, no fasciculations     LABS:                               17.0   16.03 )-----------( 155      ( 2021 06:05 )             51.8       02-    144  |  107  |  38<H>  ----------------------------<  184<H>  4.8   |  20  |  1.1    Ca    8.6      2021 06:05    TPro  6.5  /  Alb  2.9<L>  /  TBili  1.0  /  DBili  x   /  AST  43<H>  /  ALT  28  /  AlkPhos  116<H>        PT/INR - ( 2021 06:05 )   PT: 39.70 sec;   INR: 3.45 ratio         PTT - ( 2021 06:05 )  PTT:45.9 sec          EC Lead ECG (02.15.21 @ 04:32) >    Diagnosis Line Atrial fibrillation  Abnormal ECG          Imaging:     Xray Chest 1 View- PORTABLE-Routine (Xray Chest 1 View- PORTABLE-Routine in AM.) (21 @ 08:48) >    Impression:    Low lung volumes.    Slight increased bilateral opacities. No pleural effusions or pneumothorax        ASSESSMENT & PLAN:   76 year old Male with a past medical history of HTN, HLD, BPH, Hepatomegaly, valvular heart disease/ chronic Afib on Coumadin, presented to the ER with a chief complaint of Weakness and shortness of breath x 1 week, covid + x 1 day. Patient is being admitted for increased weakness secondary to COVID infection . Transferred back to [facility] , was dced to [facility] after plasma but p[atient became hypoxic.       #Acute hypoxemic respiratory failure secondary to COVID-19 pneumonia- worsening  - COVID-19 PCR positive  - EKG: a fib  - Chest X-ray: b/l opacities, repeat cxr pending for    - Oxygen Therapy: High Flow ( 60L/100%) w/ BiPAP 4 hours on 4 hours off **patient is now BiPAP dependent**  - s/p plasma ()  - c/w RDV Day 5   - c/w decadron Day 7   - f/u MRSA Nares  - f/u LE Duplex   - f/u Urine Legionella & strep   - f/u repeat inflammatory markers    - f/u repeat CXR   Inflammatory markers:   · DDimer: 155>>355  >> 197   · CRP: 8>>10 > 5.76  · Procal:0.06>>0.08   · Ferritin: 982>>1137 >> 1246     #valvular heart disease/ Chronic Afib/ essential HTN/ Mixed HLD   - continue coumadin to maintain INR 2-3  - coumadin >3 today, will hold today's dose of coumadin   - daily INR  - c/w Metorpolol succinate 50 mg, lasix 40 mg daily, [id number] CD daily, simvastatin 20mg daily   **unable to take morning meds on  due to BiPAP dependency, monitor BP & HR**  - cardiologist Dr. Travis    # DMII - uncontrolled  - increasing insulin to / + sliding scale (premeal & qhs)   - monitor FS ACHS  - A1C with Estimated Average Glucose Result: 11.0    #GERD  - c/w  protonix     #BPH  - c/w tamsulosin     #DIET: DASH/CC  #DVTppx: coumadin on hold due to supratherapeutic INR   #GIppx: protonix  #Activity: Increase as tolerated  #CODE: FULL, patient was DNR /DNI resented to full code  Dispo home when stable, fully functional at baseline        FOR FOLLOW UP:      Candy Russell MD PGY-1, [employer] [id number] Transfer Note: Medical Floor to Intensive Care     Transfer from: Medical Floor    Transfer to: Step Down     Accepting Physician: Dr. Willard (ICU fellow)     Sign out given to: Dr. Lyon [id number]    HPI / HOSPITAL COURSE:    Patient is a 76 year old Male with a past medical history of HTN, HLD, BPH, Hepatomegaly, "Heart valve disorder" ?Afib on Coumadin, presented to the ER with a chief complaint of Weakness and shortness of breath x 1 week. Patient was diagnosed with COVID x 1 day ago () by a visiting nurse. Patient admits to sinus congestion with headaches. Patient admits to decreased mobility and lack of support since both him and his spouse is COVID+, chills. Patient denies fevers (Tmax 100.0), nausea, vomiting, chest pain, abdominal pain, dysuria, constipation, diarrhea, new onset of leg swelling. In the ER, patient desaturated to low 80s after taking 10 steps. Patient follows up with coumadin clinic [id number]. Next appointment is in .       Vital Signs Last 24 Hrs  T(C): 37.3 (2021 09:14), Max: 37.3 (2021 09:14)  T(F): 99.2 (2021 09:14), Max: 99.2 (2021 09:14)  HR: 74 (2021 09:14) (65 - 88)  BP: 112/76 (2021 09:14) (112/76 - 156/99)  BP(mean): --  RR: 25 (2021 09:14) (18 - 25)  SpO2: 98% (2021 09:43) (94% - 100%)    I&O's Summary      Physical Exam:   General: NAD, awake and alert  Cardiac: RRR, no murmur, no rub, no gallop  Respiratory: Good air exchange bilaterally, no wheezes, no crackles, tachypneic   GI: Abdomen nondistended, nontender, bowel sounds present  Neuro: AAOx3, no tremors, no fasciculations     LABS:                               17.0   16.03 )-----------( 155      ( 2021 06:05 )             51.8       02-    144  |  107  |  38<H>  ----------------------------<  184<H>  4.8   |  20  |  1.1    Ca    8.6      2021 06:05    TPro  6.5  /  Alb  2.9<L>  /  TBili  1.0  /  DBili  x   /  AST  43<H>  /  ALT  28  /  AlkPhos  116<H>        PT/INR - ( 2021 06:05 )   PT: 39.70 sec;   INR: 3.45 ratio         PTT - ( 2021 06:05 )  PTT:45.9 sec        EC Lead ECG (02.15.21 @ 04:32) Diagnosis Line Atrial fibrillation. Abnormal ECG.      Imaging:  Xray Chest 1 View- PORTABLE-Routine (Xray Chest 1 View- PORTABLE-Routine in AM.) (21 @ 08:48) >  Impression: Low lung volumes. Slight increased bilateral opacities. No pleural effusions or pneumothorax      ASSESSMENT & PLAN:   76 year old Male with a past medical history of HTN, HLD, BPH, Hepatomegaly, valvular heart disease/ chronic Afib on Coumadin, presented to the ER with a chief complaint of Weakness and shortness of breath x 1 week, covid + x 1 day. Patient is being admitted for increased weakness secondary to COVID infection . Transferred back to [facility] , was dced to [facility] after plasma but p[atient became hypoxic.       #Acute hypoxemic respiratory failure secondary to COVID-19 pneumonia- worsening  - COVID-19 PCR positive  - EKG: a fib  - Chest X-ray: b/l opacities, repeat cxr pending for    - Oxygen Therapy: High Flow ( 60L/100%) w/ BiPAP 4 hours on 4 hours off **patient is now BiPAP dependent**  - s/p plasma ()  - c/w RDV Day 5   - c/w decadron Day 7   - f/u MRSA Nares  - f/u LE Duplex   - f/u Urine Legionella & strep   - f/u repeat inflammatory markers    - f/u repeat CXR   Inflammatory markers:   · DDimer: 155>>355  >> 197   · CRP: 8>>10 > 5.76  · Procal:0.06>>0.08   · Ferritin: 982>>1137 >> 1246       #valvular heart disease/ Chronic Afib/ essential HTN/ Mixed HLD   - continue coumadin to maintain INR 2-3  - coumadin >3 today, will hold today's dose of coumadin   - daily INR  - c/w Metorpolol succinate 50 mg, lasix 40 mg daily, [id number] CD daily, simvastatin 20mg daily   **unable to take morning meds on  due to BiPAP dependency, monitor BP & HR**  - cardiologist Dr. Travis      # DMII - uncontrolled  - increasing insulin to 18/8/8/8 + sliding scale (premeal & qhs)   - monitor FS ACHS  - A1C with Estimated Average Glucose Result: 11.0      #GERD  - c/w  protonix       #BPH  - c/w tamsulosin       #DIET: DASH/CC  #DVTppx: coumadin on hold due to supratherapeutic INR   #GIppx: protonix  #Activity: Increase as tolerated  #CODE: FULL, patient was DNR /DNI resented to full code  Dispo home when stable, fully functional at baseline      FOR FOLLOW UP:  Candy Russell MD PGY-1, [employer] [id number]

## 2021-02-19 NOTE — CHART NOTE - NSCHARTNOTEFT_GEN_A_CORE
Called from ultrasound tech regarding positive duplex finding: multiple DVT on b/l extremities, currently holding coumadin due to supratherapetuic INR for valvular heart disease and chronic atrial fibrillation. Follow up morning   INR and if therapeutic resume anticoagulation (coumadin) Called from ultrasound tech regarding positive duplex finding: multiple DVT on b/l extremities, currently holding coumadin due to supratherapetuic INR for valvular heart disease and chronic atrial fibrillation. Will start on therapeutic lovenox today given normal renal function, please follow up morning INR and if therapeutic resume coumadin.

## 2021-02-19 NOTE — PROGRESS NOTE ADULT - SUBJECTIVE AND OBJECTIVE BOX
SUBJECTIVE:    Patient is a 76y old Male who presents with a chief complaint of COVID pneumonia (15 Feb 2021 09:57)    Currently admitted to medicine with the primary diagnosis of COVID-19       Today is hospital day 4d.     PAST MEDICAL & SURGICAL HISTORY  Gastroesophageal reflux disease    Enlarged liver    BPH (benign prostatic hyperplasia)    Essential hypertension    Heart valve disorder    History of back surgery      ALLERGIES:  No Known Allergies    MEDICATIONS:  STANDING MEDICATIONS  dexAMETHasone  Injectable 6 milliGRAM(s) IV Push daily  diltiazem    milliGRAM(s) Oral daily  furosemide    Tablet 40 milliGRAM(s) Oral daily  influenza   Vaccine 0.5 milliLiter(s) IntraMuscular once  insulin glargine Injectable (LANTUS) 18 Unit(s) SubCutaneous at bedtime  insulin lispro (ADMELOG) corrective regimen sliding scale   SubCutaneous three times a day before meals  insulin lispro (ADMELOG) corrective regimen sliding scale   SubCutaneous at bedtime  insulin lispro Injectable (ADMELOG) 8 Unit(s) SubCutaneous three times a day before meals  metoprolol succinate ER 50 milliGRAM(s) Oral daily  pantoprazole    Tablet 40 milliGRAM(s) Oral before breakfast  simvastatin 20 milliGRAM(s) Oral at bedtime  tamsulosin 0.4 milliGRAM(s) Oral at bedtime    PRN MEDICATIONS  acetaminophen   Tablet .. 650 milliGRAM(s) Oral every 4 hours PRN  sodium chloride 0.65% Nasal 1 Spray(s) Both Nostrils two times a day PRN    VITALS:   T(F): 99.2  HR: 123  BP: 101/70  RR: 25  SpO2: 99%    LABS:                        17.0   16.03 )-----------( 155      ( 19 Feb 2021 06:05 )             51.8     02-19    144  |  107  |  38<H>  ----------------------------<  184<H>  4.8   |  20  |  1.1    Ca    8.6      19 Feb 2021 06:05    TPro  6.5  /  Alb  2.9<L>  /  TBili  1.0  /  DBili  x   /  AST  43<H>  /  ALT  28  /  AlkPhos  116<H>  02-19    PT/INR - ( 19 Feb 2021 06:05 )   PT: 39.70 sec;   INR: 3.45 ratio         PTT - ( 19 Feb 2021 06:05 )  PTT:45.9 sec              RADIOLOGY:    PHYSICAL EXAM:  GEN: No acute distress  LUNGS: Clear to auscultation bilaterally   HEART: S1/S2 present. RRR.   ABD/ GI: Soft, non-tender, non-distended. Bowel sounds present  EXT: NC/NC/NE/2+PP/FERRER  NEURO: AAOX3

## 2021-02-20 NOTE — PROGRESS NOTE ADULT - ASSESSMENT
# Ac hypoxic resp failure sec to covid pNA-- patient on  Bipap  CXR worsening-- ARDS like picture   Diagnosed on 2/11 on dexa day 5  RDV completed course-- initial dose was given in [place]  # ac DVT< from: [facility] Dup[facility] Lower Ext Vein Scan, Bilat (02.19.21 @ 16:50) >  Acute deep vein thrombosis of the left common femoral, femoral and popliteal veins. Right small saphenous vein superficial thrombosis.  Left gastrocnemius venous sinus thrombosis.  was started today on  lovenox or heparin based on creatinine..    #  possible DIC--supratherapeutic INR-- developed clots on it -- hematology consulted-- given stat Vit K and lovenox  or heparin if after repeat BMP shows change in PM .fribrin degradation products in lab. hematology recommended repeat Vit K 2.5mg as baseline PTT is high.    #  chr a fib on coumadin-- inr supratherapeutic-- on hold currently-- rate not controlled--  cardizem drip IV was started and continued on amiodarone-- case was discussed with cardiology.  #DM 2 on insulin.   # hx of nasal sinus congestion worsened by high flow oxygen-- on normal saline drops.      I called wife and informed her about his critical illness and she  said it was ok-- did not seem to understand much.

## 2021-02-20 NOTE — CONSULT NOTE ADULT - SUBJECTIVE AND OBJECTIVE BOX
Patient is a 76y old  Male who presents with a chief complaint of COVID pneumonia (15 Feb 2021 09:57)      HPI: Patient is a 76 year old Male with a past medical history of HTN, HLD, BPH, Hepatomegaly, "Heart valve disorder" ?Afib on Coumadin, presented to the ER with a chief complaint of Weakness and shortness of breath x 1 week. Patient was diagnosed with COVID x 1 day ago (2/11) by a visiting nurse. Patient admits to sinus congestion with headaches. Patient admits to decreased mobility and lack of support since both him and his spouse is COVID+, chills. Patient denies fevers (Tmax 100.0), nausea, vomiting, chest pain, abdominal pain, dysuria, constipation, diarrhea, new onset of leg swelling. In the ER, patient desaturated to low 80s after taking 10 steps. Patient follows up with coumadin clinic [id number]. Next appointment is in March, 2021.     Additional History  Patient presented with COVID 19 and was found to be hypoxic. Initially, he was sent to [facility] but was readmitted as his oxygen requirements increased and required NRB mask. Since admission, he has been undergoing management of acute hypoxic respiratory failure due to COVID 19 PNA. He has history of Atrial Fibrillation that initially was reported as "valvular A Fib" but now reportedly is only chronic Atrial fibrillation. He is on coumadin and usually has his INR checked q6 weeks at the Coumadin center. During course of admission, he had duplex of his bilateral lower ext done which showed evidence of acute deep vein thrombosis of the left common femoral, femoral and popliteal veins (preliminary read). On review of INR values, patient was subtherapeutic on day of presentation with an INR of 1.8.        ROS:  Negative except for:    PAST MEDICAL & SURGICAL HISTORY:  Gastroesophageal reflux disease    Enlarged liver    BPH (benign prostatic hyperplasia)    Essential hypertension    Heart valve disorder    History of back surgery        SOCIAL HISTORY:    FAMILY HISTORY:  Family history of esophageal cancer        MEDICATIONS  (STANDING):  aMIOdarone Infusion 0.5 mG/Min (16.7 mL/Hr) IV Continuous <Continuous>  dexAMETHasone  Injectable 6 milliGRAM(s) IV Push daily  diltiazem    milliGRAM(s) Oral daily  enoxaparin Injectable 100 milliGRAM(s) SubCutaneous every 12 hours  furosemide    Tablet 40 milliGRAM(s) Oral daily  influenza   Vaccine 0.5 milliLiter(s) IntraMuscular once  insulin glargine Injectable (LANTUS) 18 Unit(s) SubCutaneous at bedtime  insulin lispro (ADMELOG) corrective regimen sliding scale   SubCutaneous three times a day before meals  insulin lispro (ADMELOG) corrective regimen sliding scale   SubCutaneous at bedtime  insulin lispro Injectable (ADMELOG) 8 Unit(s) SubCutaneous three times a day before meals  metoprolol tartrate 50 milliGRAM(s) Oral three times a day  metoprolol tartrate Injectable 5 milliGRAM(s) IV Push every 6 hours  pantoprazole    Tablet 40 milliGRAM(s) Oral before breakfast  simvastatin 20 milliGRAM(s) Oral at bedtime  tamsulosin 0.4 milliGRAM(s) Oral at bedtime    MEDICATIONS  (PRN):  acetaminophen   Tablet .. 650 milliGRAM(s) Oral every 4 hours PRN Temp greater or equal to 38.5C (101.3F)  metoprolol tartrate Injectable 5 milliGRAM(s) IV Push every 15 minutes PRN tachy >120  sodium chloride 0.65% Nasal 1 Spray(s) Both Nostrils two times a day PRN Nasal Congestion    Height (cm): 177 (02-19-21 @ 19:31)  Weight (kg): 96 (02-19-21 @ 19:31)  BMI (kg/m2): 30.6 (02-19-21 @ 19:31)  BSA (m2): 2.13 (02-19-21 @ 19:31)  Allergies    No Known Allergies    Intolerances        Vital Signs Last 24 Hrs  T(C): 37.2 (20 Feb 2021 11:42), Max: 37.2 (20 Feb 2021 06:04)  T(F): 98.9 (20 Feb 2021 11:42), Max: 98.9 (20 Feb 2021 06:04)  HR: 151 (20 Feb 2021 14:29) (123 - 160)  BP: 105/87 (20 Feb 2021 14:29) (87/56 - 154/90)  BP(mean): --  RR: 18 (20 Feb 2021 14:29) (18 - 24)  SpO2: 98% (20 Feb 2021 14:29) (89% - 100%)    PHYSICAL EXAM  General: adult in NAD  HEENT: anicteric sclera, pink conjunctiva  Neck: supple  CV: normal S1/S2 with no murmur rubs or gallops  Lungs: CTABL  Abdomen: soft non-tender non-distended   Ext: no edema  Skin: no rashes  Neuro: alert and oriented X 4, no focal deficits      LABS:                          17.0   20.11 )-----------( 175      ( 20 Feb 2021 12:27 )             52.3         Mean Cell Volume : 93.1 fL  Mean Cell Hemoglobin : 30.2 pg  Mean Cell Hemoglobin Concentration : 32.5 g/dL  Auto Neutrophil # : 18.77 K/uL  Auto Lymphocyte # : 0.53 K/uL  Auto Monocyte # : 0.49 K/uL  Auto Eosinophil # : 0.00 K/uL  Auto Basophil # : 0.06 K/uL  Auto Neutrophil % : 93.4 %  Auto Lymphocyte % : 2.6 %  Auto Monocyte % : 2.4 %  Auto Eosinophil % : 0.0 %  Auto Basophil % : 0.3 %      Serial CBC's  02-20 @ 12:27  Hct-52.3 / Hgb-17.0 / Plat-175 / RBC-5.62 / WBC-20.11  Serial CBC's  02-19 @ 20:00  Hct-50.4 / Hgb-17.1 / Plat-135 / RBC-5.52 / WBC-17.39  Serial CBC's  02-19 @ 06:05  Hct-51.8 / Hgb-17.0 / Plat-155 / RBC-5.60 / WBC-16.03  Serial CBC's  02-18 @ 05:33  Hct-55.3 / Hgb-18.1 / Plat-294 / RBC-5.89 / WBC-13.65  Serial CBC's  02-17 @ 22:36  Hct-54.5 / Hgb-18.4 / Plat-365 / RBC-5.94 / WBC-12.88      02-19    144  |  107  |  38<H>  ----------------------------<  184<H>  4.8   |  20  |  1.1    Ca    8.6      19 Feb 2021 06:05    TPro  6.5  /  Alb  2.9<L>  /  TBili  1.0  /  DBili  x   /  AST  43<H>  /  ALT  28  /  AlkPhos  116<H>  02-19      PT/INR - ( 19 Feb 2021 20:00 )   PT: 37.50 sec;   INR: 3.26 ratio         PTT - ( 19 Feb 2021 06:05 )  PTT:45.9 sec    Ferritin, Serum: 1231 ng/mL (02-17 @ 12:07)  Ferritin, Serum: 1246 ng/mL (02-15 @ 04:20)    BLOOD SMEAR INTERPRETATION:       RADIOLOGY & ADDITIONAL STUDIES:    < from: [facility] Duplex Lower Ext Vein Scan, Bilat (02.19.21 @ 16:50) >  Impression:    Acute deep vein thrombosis of the left common femoral, femoral and popliteal veins. Right small saphenous vein superficial thrombosis.  Left gastrocnemius venous sinus thrombosis.    ICD-10:M79.89    ******PRELIMINARY REPORT******    ******PRELIMINARY REPORT******          RANDI CRUZ M.D., RESIDENT RADIOLOGIST    < end of copied text >   Patient is a 76y old  Male who presents with a chief complaint of COVID pneumonia (15 Feb 2021 09:57)      HPI: Patient is a 76 year old Male with a past medical history of HTN, HLD, BPH, Hepatomegaly, "Heart valve disorder" ?Afib on Coumadin, presented to the ER with a chief complaint of Weakness and shortness of breath x 1 week. Patient was diagnosed with COVID x 1 day ago (2/11) by a visiting nurse. Patient admits to sinus congestion with headaches. Patient admits to decreased mobility and lack of support since both him and his spouse is COVID+, chills. Patient denies fevers (Tmax 100.0), nausea, vomiting, chest pain, abdominal pain, dysuria, constipation, diarrhea, new onset of leg swelling. In the ER, patient desaturated to low 80s after taking 10 steps. Patient follows up with coumadin clinic [id number]. Next appointment is in March, 2021.     Additional History  Patient presented with COVID 19 and was found to be hypoxic. Initially, he was sent to [facility] but was readmitted as his oxygen requirements increased and required NRB mask. Since admission, he has been undergoing management of acute hypoxic respiratory failure due to COVID 19 PNA. He has history of Atrial Fibrillation that initially was reported as "valvular A Fib" but now reportedly is only chronic Atrial fibrillation. He is on coumadin and usually has his INR checked q6 weeks at the Coumadin center. During course of admission, he had duplex of his bilateral lower ext done which showed evidence of acute deep vein thrombosis of the left common femoral, femoral and popliteal veins (preliminary read). On review of INR values, patient was subtherapeutic on day of presentation with an INR of 1.8.        ROS:  Negative except for:    PAST MEDICAL & SURGICAL HISTORY:  Gastroesophageal reflux disease    Enlarged liver    BPH (benign prostatic hyperplasia)    Essential hypertension    Heart valve disorder    History of back surgery        SOCIAL HISTORY:    FAMILY HISTORY:  Family history of esophageal cancer        MEDICATIONS  (STANDING):  aMIOdarone Infusion 0.5 mG/Min (16.7 mL/Hr) IV Continuous <Continuous>  dexAMETHasone  Injectable 6 milliGRAM(s) IV Push daily  diltiazem    milliGRAM(s) Oral daily  enoxaparin Injectable 100 milliGRAM(s) SubCutaneous every 12 hours  furosemide    Tablet 40 milliGRAM(s) Oral daily  influenza   Vaccine 0.5 milliLiter(s) IntraMuscular once  insulin glargine Injectable (LANTUS) 18 Unit(s) SubCutaneous at bedtime  insulin lispro (ADMELOG) corrective regimen sliding scale   SubCutaneous three times a day before meals  insulin lispro (ADMELOG) corrective regimen sliding scale   SubCutaneous at bedtime  insulin lispro Injectable (ADMELOG) 8 Unit(s) SubCutaneous three times a day before meals  metoprolol tartrate 50 milliGRAM(s) Oral three times a day  metoprolol tartrate Injectable 5 milliGRAM(s) IV Push every 6 hours  pantoprazole    Tablet 40 milliGRAM(s) Oral before breakfast  simvastatin 20 milliGRAM(s) Oral at bedtime  tamsulosin 0.4 milliGRAM(s) Oral at bedtime    MEDICATIONS  (PRN):  acetaminophen   Tablet .. 650 milliGRAM(s) Oral every 4 hours PRN Temp greater or equal to 38.5C (101.3F)  metoprolol tartrate Injectable 5 milliGRAM(s) IV Push every 15 minutes PRN tachy >120  sodium chloride 0.65% Nasal 1 Spray(s) Both Nostrils two times a day PRN Nasal Congestion    Height (cm): 177 (02-19-21 @ 19:31)  Weight (kg): 96 (02-19-21 @ 19:31)  BMI (kg/m2): 30.6 (02-19-21 @ 19:31)  BSA (m2): 2.13 (02-19-21 @ 19:31)  Allergies    No Known Allergies    Intolerances        Vital Signs Last 24 Hrs  T(C): 37.2 (20 Feb 2021 11:42), Max: 37.2 (20 Feb 2021 06:04)  T(F): 98.9 (20 Feb 2021 11:42), Max: 98.9 (20 Feb 2021 06:04)  HR: 151 (20 Feb 2021 14:29) (123 - 160)  BP: 105/87 (20 Feb 2021 14:29) (87/56 - 154/90)  BP(mean): --  RR: 18 (20 Feb 2021 14:29) (18 - 24)  SpO2: 98% (20 Feb 2021 14:29) (89% - 100%)    PHYSICAL EXAM  General: on HFNC and venti mask, slightly tachypneic   HEENT: anicteric sclera, pink conjunctiva  Neck: supple  CV: normal S1/S2 with no murmur rubs or gallops  Lungs: CTABL  Abdomen: soft non-tender non-distended   Ext: no edema  Skin: no rashes  Neuro: alert and oriented X 4, no focal deficits      LABS:                          17.0   20.11 )-----------( 175      ( 20 Feb 2021 12:27 )             52.3         Mean Cell Volume : 93.1 fL  Mean Cell Hemoglobin : 30.2 pg  Mean Cell Hemoglobin Concentration : 32.5 g/dL  Auto Neutrophil # : 18.77 K/uL  Auto Lymphocyte # : 0.53 K/uL  Auto Monocyte # : 0.49 K/uL  Auto Eosinophil # : 0.00 K/uL  Auto Basophil # : 0.06 K/uL  Auto Neutrophil % : 93.4 %  Auto Lymphocyte % : 2.6 %  Auto Monocyte % : 2.4 %  Auto Eosinophil % : 0.0 %  Auto Basophil % : 0.3 %      Serial CBC's  02-20 @ 12:27  Hct-52.3 / Hgb-17.0 / Plat-175 / RBC-5.62 / WBC-20.11  Serial CBC's  02-19 @ 20:00  Hct-50.4 / Hgb-17.1 / Plat-135 / RBC-5.52 / WBC-17.39  Serial CBC's  02-19 @ 06:05  Hct-51.8 / Hgb-17.0 / Plat-155 / RBC-5.60 / WBC-16.03  Serial CBC's  02-18 @ 05:33  Hct-55.3 / Hgb-18.1 / Plat-294 / RBC-5.89 / WBC-13.65  Serial CBC's  02-17 @ 22:36  Hct-54.5 / Hgb-18.4 / Plat-365 / RBC-5.94 / WBC-12.88      02-19    144  |  107  |  38<H>  ----------------------------<  184<H>  4.8   |  20  |  1.1    Ca    8.6      19 Feb 2021 06:05    TPro  6.5  /  Alb  2.9<L>  /  TBili  1.0  /  DBili  x   /  AST  43<H>  /  ALT  28  /  AlkPhos  116<H>  02-19      PT/INR - ( 19 Feb 2021 20:00 )   PT: 37.50 sec;   INR: 3.26 ratio         PTT - ( 19 Feb 2021 06:05 )  PTT:45.9 sec    Ferritin, Serum: 1231 ng/mL (02-17 @ 12:07)  Ferritin, Serum: 1246 ng/mL (02-15 @ 04:20)    BLOOD SMEAR INTERPRETATION:       RADIOLOGY & ADDITIONAL STUDIES:    < from: [facility] Duplex Lower Ext Vein Scan, Bilat (02.19.21 @ 16:50) >  Impression:    Acute deep vein thrombosis of the left common femoral, femoral and popliteal veins. Right small saphenous vein superficial thrombosis.  Left gastrocnemius venous sinus thrombosis.    ICD-10:M79.89    ******PRELIMINARY REPORT******    ******PRELIMINARY REPORT******          RANDI CRUZ M.D., RESIDENT RADIOLOGIST    < end of copied text >

## 2021-02-20 NOTE — CONSULT NOTE ADULT - SUBJECTIVE AND OBJECTIVE BOX
Date of Admission:    CHIEF COMPLAINT:    HISTORY OF PRESENT ILLNESS: Patient is a 76 year old Male with a past medical history of HTN, HLD, BPH, Hepatomegaly, "Heart valve disorder" ?Afib on Coumadin, presented to the ER with a chief complaint of Weakness and shortness of breath x 1 week. Patient was diagnosed with COVID x 1 day ago (2/11) by a visiting nurse. Patient admits to sinus congestion with headaches. Patient admits to decreased mobility and lack of support since both him and his spouse is COVID+, chills. Patient denies fevers (Tmax 100.0), nausea, vomiting, chest pain, abdominal pain, dysuria, constipation, diarrhea, new onset of leg swelling. In the ER, patient desaturated to low 80s after taking 10 steps. Patient follows up with coumadin clinic [id number]. Next appointment is in March, 2021.         PAST MEDICAL & SURGICAL HISTORY:  Gastroesophageal reflux disease    Enlarged liver    BPH (benign prostatic hyperplasia)    Essential hypertension    Heart valve disorder    History of back surgery      HEALTH ISSUES - PROBLEM Dx:        FAMILY HISTORY:  Family history of esophageal cancer      Allergies    No Known Allergies    Intolerances    	  Home Medications:  Coumadin 3 mg oral tablet: 1 tab(s) orally once a day (12 Feb 2021 18:29)  dilTIAZem 240 mg/24 hours oral capsule, extended release: 1 cap(s) orally once a day (12 Feb 2021 18:29)  furosemide 40 mg oral tablet: 1 tab(s) orally once a day (12 Feb 2021 18:29)  losartan 50 mg oral tablet: 1 tab(s) orally 2 times a day (12 Feb 2021 18:29)  metFORMIN 500 mg oral tablet: 1 tab(s) orally 2 times a day (12 Feb 2021 18:29)  metoprolol succinate 50 mg oral tablet, extended release: 1 tab(s) orally once a day (12 Feb 2021 18:29)  omeprazole 20 mg oral delayed release capsule: 1 cap(s) orally once a day (12 Feb 2021 18:29)  simvastatin 20 mg oral tablet: 1 tab(s) orally once a day (at bedtime) (12 Feb 2021 18:29)  tamsulosin 0.4 mg oral capsule: 1 cap(s) orally once a day (12 Feb 2021 18:29)    MEDICATIONS  (STANDING):  aMIOdarone Infusion 0.5 mG/Min (16.7 mL/Hr) IV Continuous <Continuous>  dexAMETHasone  Injectable 6 milliGRAM(s) IV Push daily  diltiazem Infusion 5 mG/Hr (5 mL/Hr) IV Continuous <Continuous>  diltiazem Injectable 24 milliGRAM(s) IV Push once  furosemide    Tablet 40 milliGRAM(s) Oral daily  heparin  Infusion 1500 Unit(s)/Hr (15 mL/Hr) IV Continuous <Continuous>  influenza   Vaccine 0.5 milliLiter(s) IntraMuscular once  insulin glargine Injectable (LANTUS) 18 Unit(s) SubCutaneous at bedtime  insulin lispro (ADMELOG) corrective regimen sliding scale   SubCutaneous three times a day before meals  insulin lispro (ADMELOG) corrective regimen sliding scale   SubCutaneous at bedtime  insulin lispro Injectable (ADMELOG) 8 Unit(s) SubCutaneous three times a day before meals  meropenem  IVPB 1000 milliGRAM(s) IV Intermittent every 12 hours  metoprolol tartrate Injectable 5 milliGRAM(s) IV Push every 8 hours  pantoprazole    Tablet 40 milliGRAM(s) Oral before breakfast  phytonadione  IVPB 2.5 milliGRAM(s) IV Intermittent once  simvastatin 20 milliGRAM(s) Oral at bedtime  tamsulosin 0.4 milliGRAM(s) Oral at bedtime    MEDICATIONS  (PRN):  acetaminophen   Tablet .. 650 milliGRAM(s) Oral every 4 hours PRN Temp greater or equal to 38.5C (101.3F)  metoprolol tartrate Injectable 5 milliGRAM(s) IV Push every 15 minutes PRN tachy >120  sodium chloride 0.65% Nasal 1 Spray(s) Both Nostrils two times a day PRN Nasal Congestion          PHYSICAL EXAM:  T(C): 37.2 (02-20-21 @ 11:42), Max: 37.2 (02-20-21 @ 06:04)  HR: 146 (02-20-21 @ 14:58) (123 - 160)  BP: 133/67 (02-20-21 @ 14:58) (87/56 - 154/90)  RR: 18 (02-20-21 @ 14:29) (18 - 24)  SpO2: 98% (02-20-21 @ 14:29) (89% - 100%)  Wt(kg): --  I&O's Summary    19 Feb 2021 07:01  -  20 Feb 2021 07:00  --------------------------------------------------------  IN: 66.6 mL / OUT: 0 mL / NET: 66.6 mL    20 Feb 2021 07:01  -  20 Feb 2021 16:26  --------------------------------------------------------  IN: 83.5 mL / OUT: 0 mL / NET: 83.5 mL      Daily Height in cm: 177 (19 Feb 2021 19:31)    Daily       LABS:	 	                          17.0   20.11 )-----------( 175      ( 20 Feb 2021 12:27 )             52.3     02-20    138  |  101  |  77<HH>  ----------------------------<  303<H>  4.8   |  17  |  1.9<H>    Ca    8.5      20 Feb 2021 12:27    TPro  6.6  /  Alb  3.0<L>  /  TBili  1.0  /  DBili  x   /  AST  36  /  ALT  21  /  AlkPhos  134<H>  02-20        PT/INR - ( 20 Feb 2021 12:27 )   PT: 17.60 sec;   INR: 1.53 ratio         PTT - ( 20 Feb 2021 12:27 )  PTT:48.4 sec    proBNP: Serum Pro-Brain Natriuretic Peptide: 1086 pg/mL (02-20 @ 12:27)      	  ASSESSMENT/PLAN: 	    1) Persistent atrial fibrillation, presently rate is not controlled due to respiratory failure on BIPAP.  Continue amiodarone.  Start cardizem drip at 5mg/hour, if BP is stable increase to 10 mg / hour.  If needed give digoxin 0.25 mg IVP X 2 doses.  Continue heparin drip.    2)DVT and possible PE  Continue heparin drip.    3)COVID - 19 pneumonia on BIPAP.  F/U Pulmonary and critical care recommendation.    4)SMILEY

## 2021-02-20 NOTE — PROGRESS NOTE ADULT - ASSESSMENT
IMPRESSION:  Acute hypoxemic respiratory failure  COVID-19 PNA  HO SMILEY, noncompliant with CPAP  HO Afib, on coumadin at home  DVT  PLAN:    CNS: Avoid CNS depressants.     HEENT: Oral care    PULMONARY:  HOB @ 45 degrees.  Aspiration precautions. AVAPS 4hrs on/off/hs/PRN. TV-450, EPAP-10, minIPAP-14, maxIPAP-25, RR-14. Alternate with HHFNC. . Target SpO2>94%, wean oxygen as tolerated. Continue with decadron, D#7.   cxr today   CARDIOVASCULAR: Avoid volume overload. Repeat BNP. Rate control.     GI: GI prophylaxis.  Feeding as tolerated when off AVAPS.  Bowel regimen     RENAL:  Follow up lytes.  Correct as needed.     INFECTIOUS DISEASE: PanCx. Repeat procal. Check nasal MRSA. Check urine strep/ legionella. Trend inflammatory markers. FU ID.     HEMATOLOGICAL:  DVT prophylaxis.  doppler positive on leovenox Q 12 hrs   follow cbc     ENDOCRINE:  Follow up FS.  Insulin protocol if needed.    MUSCULOSKELETAL: bedrest    Overall poor prognosis    Step Down Unit monitoring for now     IMPRESSION:  Acute hypoxemic respiratory failure  COVID-19 PNA  HO SMILEY, noncompliant with CPAP  HO Afib, on coumadin at home  DVT  PLAN:    CNS: Avoid CNS depressants.     HEENT: Oral care    PULMONARY:  HOB @ 45 degrees.  Aspiration precautions. AVAPS 4hrs on/off/hs/PRN. TV-450, EPAP-10, minIPAP-14, maxIPAP-25, RR-14. Alternate with HHFNC. . Target SpO2>94%, wean oxygen as tolerated. Continue with decadron, D#7.   cxr today   CARDIOVASCULAR: Avoid volume overload. Repeat BNP. Rate control on amiodarone iv while npo on bipap   give cardizem 10 push now   try to wean off bipap if tolerate resume home meds     GI: GI prophylaxis.  Feeding as tolerated when off AVAPS.  Bowel regimen     RENAL:  Follow up lytes.  Correct as needed.     INFECTIOUS DISEASE: PanCx. Repeat procal. Check nasal MRSA. Check urine strep/ legionella. Trend inflammatory markers. FU ID.     HEMATOLOGICAL:  DVT prophylaxis.  doppler positive on leovenox Q 12 hrs   follow cbc     ENDOCRINE:  Follow up FS.  Insulin protocol if needed.    MUSCULOSKELETAL: bedrest    Overall poor prognosis    Step Down Unit monitoring for now     IMPRESSION:  Acute hypoxemic respiratory failure  COVID-19 PNA  HO SMILEY, noncompliant with CPAP  HO Afib, on coumadin at home  DVT  PLAN:    CNS: Avoid CNS depressants.     HEENT: Oral care    PULMONARY:  HOB @ 45 degrees.  Aspiration precautions. AVAPS 4hrs on/off/hs/PRN. TV-450, EPAP-10, minIPAP-14, maxIPAP-25, RR-14. Alternate with HHFNC. . Target SpO2>94%, wean oxygen as tolerated. Continue with decadron, D#7.   cxr today   dexa  CARDIOVASCULAR: Avoid volume overload. Repeat BNP. Rate control on amiodarone iv while npo on bipap   give cardizem 10 push now   try to wean off bipap if tolerate resume home meds     GI: GI prophylaxis.  Feeding as tolerated when off AVAPS.  Bowel regimen     RENAL:  Follow up lytes.  Correct as needed.     INFECTIOUS: DISEASE: PanCx. Repeat procal. Check nasal MRSA. Check urine strep/ legionella. Trend inflammatory markers. FU ID.   start merop   HEMATOLOGICAL:  DVT prophylaxis.  doppler positive on leovenox Q 12 hrs follow GFR if worsen switch to heparin drip follow INR PTT  follow cbc     ENDOCRINE:  Follow up FS.  Insulin protocol if needed.    MUSCULOSKELETAL: bedrest    Overall poor prognosis    Step Down Unit monitoring for now

## 2021-02-20 NOTE — CONSULT NOTE ADULT - ATTENDING COMMENTS
I spoke to my fell last night we were called by team because of a new DVT in the setting of Coumadin therapy for atrial fibrillation in a  patient with  COVID 19. His INR is on admission were subtherapeutic and he also might be in DIC so we suggested to the team to reverse the Coumadin with vitamin K 5 mg IV and to start him on Lovenox 1 mg per kilogram b.i.d. His creatinine at that point in time was normal.    Today his blood work is demonstrating PRAVIN with GFR still above 30 but in the setting of acute renal failure  actual  renal function is unclear. His repeat PT and PTT from today are still elevated. The INR is 1.5  PTT is 48.    When we saw him he was tachycardic and was on high flow oxygen and face mask previously was on BiPAP. He also was a little bit tachypnea but he denied shortness of breath.     Plan:  #Acute DVT  likely  due to COVID, currently on Lovenox b.i.d. dosing but now has  PRAVIN probably due to diuresis   -Please recheck PTT as well as renal function prior to his next dose of Lovenox. If GFR remains over 30 continue with current Lovenox dosing but if GFR falls to under 30 he will need to start heparin drip if his PTT is normal, if PTT remains prolonged than dosing of heparin will be difficult and he will likely be subtherapeutic and thus would  withhold his Lovenox dose tonight  and tomorrow we'll likely administer  Renal dosing of Lovenox which would be once a day and will check anti-Xa activity  -Would also administer 2.5 mg IV vitamin K again since INRs is 1.5 and aniket will also hlelp PTT. Which will start tomorrow.     #Elevated PT/PTT is likely multifactorial and due to recent Coumadin as well as possible DIC.    #His platelet count initially fell now increasing, he has not been thrombocytopenic and this is likely due to consumption from his clot and possibly DIC    #COVID  treatment as per Team    #Afib control as per team. I spoke to my fellow  last night we were called by team because of a new DVT in the setting of Coumadin therapy for atrial fibrillation in a  patient with  COVID 19. His INR is on admission were subtherapeutic and he also might be in DIC so we suggested to the team to reverse the Coumadin with vitamin K 5 mg IV and to start him on Lovenox 1 mg per kilogram b.i.d. His creatinine at that point in time was normal.    Today his blood work is demonstrating PRAVIN with GFR still above 30 but in the setting of acute renal failure  actual  renal function is unclear. His repeat PT and PTT from today are still elevated. The INR is 1.5  PTT is 48.    When we saw him he was tachycardic and was on high flow oxygen and face mask previously was on BiPAP. He also was a little bit tachypnea but he denied shortness of breath.     Plan:  #Acute DVT  likely  due to COVID, currently on Lovenox b.i.d. dosing but now has  PRAVIN probably due to diuresis   -Please recheck PTT as well as renal function prior to his next dose of Lovenox. If GFR remains over 30 continue with current Lovenox dosing but if GFR falls to under 30 he will need to start heparin drip if his PTT is normal, if PTT remains prolonged than dosing of heparin will be difficult and he will likely be subtherapeutic and thus would  withhold his Lovenox dose tonight  and tomorrow we'll likely administer  renal dosing of Lovenox which would be once a day and will check anti-Xa activity  -Would also administer 2.5 mg IV vitamin K again since INR is 1.5 and maybe will also hlelp PTT.      #Elevated PT/PTT is likely multifactorial and due to recent Coumadin as well as possible DIC.    #His platelet count initially fell now increasing, he has not been thrombocytopenic and this is likely due to consumption from his clot and possibly DIC    #COVID  treatment as per Team    #Afib control as per team.

## 2021-02-20 NOTE — CHART NOTE - NSCHARTNOTEFT_GEN_A_CORE
Patient seen and examined.  No acute changes in clinical status.  AFIB with RVR overnight, started on Amio gtt.  Patient placed on HFNC and given PO meds (Cardizem and Metoprolol), will give 10 mg IV Cardizem if rate still elevated. CXR, Repeat procal, nasal MRSA, urine strep/ legionella ordered.  Wife Rupali called at [phone] and given updates.

## 2021-02-20 NOTE — PROGRESS NOTE ADULT - SUBJECTIVE AND OBJECTIVE BOX
SUBJECTIVE:    Patient is a 76y old Male who presents with a chief complaint of COVID -  19 infection. (20 Feb 2021 16:23)    Currently admitted to medicine with the primary diagnosis of COVID-19       Today is hospital day 5d.     PAST MEDICAL & SURGICAL HISTORY  Gastroesophageal reflux disease    Enlarged liver    BPH (benign prostatic hyperplasia)    Essential hypertension    Heart valve disorder    History of back surgery      ALLERGIES:  No Known Allergies    MEDICATIONS:  STANDING MEDICATIONS  aMIOdarone Infusion 0.5 mG/Min IV Continuous <Continuous>  dexAMETHasone  Injectable 6 milliGRAM(s) IV Push daily  diltiazem Infusion 5 mG/Hr IV Continuous <Continuous>  furosemide    Tablet 40 milliGRAM(s) Oral daily  furosemide   Injectable 40 milliGRAM(s) IV Push once  heparin  Infusion 1500 Unit(s)/Hr IV Continuous <Continuous>  influenza   Vaccine 0.5 milliLiter(s) IntraMuscular once  insulin glargine Injectable (LANTUS) 18 Unit(s) SubCutaneous at bedtime  insulin lispro (ADMELOG) corrective regimen sliding scale   SubCutaneous three times a day before meals  insulin lispro (ADMELOG) corrective regimen sliding scale   SubCutaneous at bedtime  insulin lispro Injectable (ADMELOG) 8 Unit(s) SubCutaneous three times a day before meals  meropenem  IVPB 1000 milliGRAM(s) IV Intermittent every 12 hours  metoprolol tartrate Injectable 5 milliGRAM(s) IV Push every 8 hours  pantoprazole    Tablet 40 milliGRAM(s) Oral before breakfast  simvastatin 20 milliGRAM(s) Oral at bedtime  tamsulosin 0.4 milliGRAM(s) Oral at bedtime    PRN MEDICATIONS  acetaminophen   Tablet .. 650 milliGRAM(s) Oral every 4 hours PRN  sodium chloride 0.65% Nasal 1 Spray(s) Both Nostrils two times a day PRN    VITALS:   T(F): 97.5  HR: 130  BP: 158/90  RR: 18  SpO2: 100%    LABS:                        17.0   20.11 )-----------( 175      ( 20 Feb 2021 12:27 )             52.3     02-20    141  |  104  |  83<HH>  ----------------------------<  259<H>  5.0   |  22  |  1.9<H>    Ca    8.9      20 Feb 2021 16:51    TPro  6.5  /  Alb  2.8<L>  /  TBili  0.8  /  DBili  x   /  AST  37  /  ALT  22  /  AlkPhos  137<H>  02-20    PT/INR - ( 20 Feb 2021 16:51 )   PT: 16.70 sec;   INR: 1.45 ratio         PTT - ( 20 Feb 2021 16:51 )  PTT:33.1 sec    ABG - ( 20 Feb 2021 15:59 )  pH, Arterial: 7.46  pH, Blood: x     /  pCO2: 32    /  pO2: 73    / HCO3: 22    / Base Excess: -0.3  /  SaO2: 94                        RADIOLOGY:    PHYSICAL EXAM:  GEN: in acute distress  LUNGS: Clear to auscultation bilaterally   HEART: S1/S2 present. RRR.   ABD/ GI: Soft, non-tender, non-distended. Bowel sounds present  EXT: NC/NC/NE/2+PP/FERRER  NEURO: AAOX3

## 2021-02-20 NOTE — CONSULT NOTE ADULT - ASSESSMENT
Patient is a 76 year old Male with a PMHx of HTN, HLD, BPH, Hepatomegaly, "Heart valve disorder" ?Afib on Coumadin, presented to the ER with SOB, with COVID 19 infection. Currently undergoing management of Acute hypoxic respiratory failure in the setting of COVID 19 PNA. Lower ext duplex on admission shows evidence of acute DVT.     ASSESSMENT  #) Acute Hypoxic Respiratory Failure 2/2 to COVID 19 PNA   #) Chronic Atrial Fibrillation on Coumadin (now with no evidence of valvular involvement?)   #) Acute DVT of Left common femoral, femoral and popliteal veins     PLAN  - Patient with subtherapeutic INR on admission on 2/12/21. This can have contributed to clot development as well as COVID 19 which leads to a hypercoagulable state which can worsen DVT progression. However, lower ext duplex was not done until 2/19/21  - INR noted at 3.45 on 2/19, recommended to give Vitamin K 5mg IV at the time (which he received) and start on Lovenox 1mg/kg BID   - Patient received 5mg IV Vitamin K but INR this morning is not available, please repeat   - If there is no evidence of valvular A Fib (as documented by primary team) can consider switching to Eliquis on discharge (per patient and primary team preference)   - He should have a 2D echo done either outpatient (or can ask for latest report from his outpatient cardiologist, Dr. Montes) Patient is a 76 year old Male with a PMHx of HTN, HLD, BPH, Hepatomegaly, "Heart valve disorder" ?Afib on Coumadin, presented to the ER with SOB, with COVID 19 infection. Currently undergoing management of Acute hypoxic respiratory failure in the setting of COVID 19 PNA. Lower ext duplex on admission shows evidence of acute DVT.     ASSESSMENT  #) Acute Hypoxic Respiratory Failure 2/2 to COVID 19 PNA   #) Chronic Atrial Fibrillation on Coumadin (now with no evidence of valvular involvement?)   #) Acute DVT of Left common femoral, femoral and popliteal veins     PLAN  - Patient with subtherapeutic INR on admission on 2/12/21. This can have contributed to clot development as well as COVID 19 which leads to a hypercoagulable state which can worsen DVT progression. However, lower ext duplex was not done until 2/19/21  - INR noted at 3.45 on 2/19, recommended to give Vitamin K 5mg IV at the time (which he received) and start on Lovenox 1mg/kg BID   - Patient received 5mg IV Vitamin K but INR this morning is not available, please repeat   - If there is no evidence of valvular A Fib (as documented by primary team) can consider switching to Eliquis on discharge (per patient and primary team preference). Coumadin can also be continued. However, would continue with Lovenox for the near future until he has improved from COVID 19 given he has had "coumadin failure"   - He should have a 2D echo done either outpatient (or can ask for latest report from his outpatient cardiologist, Dr. Montes) Patient is a 76 year old Male with a PMHx of HTN, HLD, BPH, Hepatomegaly, "Heart valve disorder" ?Afib on Coumadin, presented to the ER with SOB, with COVID 19 infection. Currently undergoing management of Acute hypoxic respiratory failure in the setting of COVID 19 PNA. Lower ext duplex on admission shows evidence of acute DVT.     ASSESSMENT  #) Acute Hypoxic Respiratory Failure 2/2 to COVID 19 PNA   #) Chronic Atrial Fibrillation on Coumadin (now with no evidence of valvular involvement?)   #) Acute DVT of Left common femoral, femoral and popliteal veins     PLAN  - Patient with subtherapeutic INR on admission on 2/12/21. This can have contributed to clot development as well as COVID 19 which leads to a hypercoagulable state which can worsen DVT progression. However, lower ext duplex was not done until 2/19/21  - INR noted at 3.45 on 2/19, recommended to give Vitamin K 5mg IV at the time (which he received) and start on Lovenox 1mg/kg BID   - Patient received 5mg IV Vitamin K but INR this morning is not available, please repeat   - If there is no evidence of valvular A Fib (as documented by primary team) can consider switching to Eliquis on discharge (per patient and primary team preference). Coumadin can also be continued. However, would continue with Lovenox for the near future until he has improved from COVID 19 given he has had "coumadin failure"   - He should have a 2D echo done either outpatient (or can ask for latest report from his outpatient cardiologist, Dr. Montes)    Addendum: Patient's Cr today 1.9, previously 1.1. Given he is in PRAVIN, will need to repeat both PTT and CMP (for Creatinine) this evening prior to administering Lovenox dose. If his GFR remains under 30, he will need to be switched to Heparin drip. However, his PTT is 48, please administer 2.5 mg IV Vitamin K so that dosing of heparin drip can be more accurate. IF his PTT does remain elevated on repeat after Vitamin K administration, may need to consider Lovenox dosing as once a day

## 2021-02-20 NOTE — PROGRESS NOTE ADULT - SUBJECTIVE AND OBJECTIVE BOX
Patient is a 76y old  Male who presents with a chief complaint of COVID pneumonia (15 Feb 2021 09:57)      Over Night Events:  Patient seen and examined.       ROS:  See HPI    PHYSICAL EXAM    ICU Vital Signs Last 24 Hrs  T(C): 37.2 (20 Feb 2021 06:04), Max: 37.3 (19 Feb 2021 09:14)  T(F): 98.9 (20 Feb 2021 06:04), Max: 99.2 (19 Feb 2021 09:14)  HR: 137 (20 Feb 2021 07:38) (74 - 160)  BP: 109/56 (20 Feb 2021 07:38) (87/56 - 123/77)  BP(mean): 101 (19 Feb 2021 14:02) (101 - 101)  ABP: --  ABP(mean): --  RR: 18 (20 Feb 2021 07:38) (18 - 25)  SpO2: 97% (20 Feb 2021 07:38) (97% - 100%)      General:  HEENT:                Lymph Nodes: NO cervical LN   Lungs: Bilateral BS  Cardiovascular: Regular   Abdomen: Soft, Positive BS  Extremities: No clubbing   Skin: warm   Neurological:   Musculoskeletal: move all ext     I&O's Detail    19 Feb 2021 07:01  -  20 Feb 2021 07:00  --------------------------------------------------------  IN:    Amiodarone: 66.6 mL  Total IN: 66.6 mL    OUT:  Total OUT: 0 mL    Total NET: 66.6 mL          LABS:                          17.1   17.39 )-----------( 135      ( 19 Feb 2021 20:00 )             50.4         19 Feb 2021 06:05    144    |  107    |  38     ----------------------------<  184    4.8     |  20     |  1.1      Ca    8.6        19 Feb 2021 06:05                                               PT/INR - ( 19 Feb 2021 20:00 )   PT: 37.50 sec;   INR: 3.26 ratio         PTT - ( 19 Feb 2021 06:05 )  PTT:45.9 sec                                                                                                                                                                                        MEDICATIONS  (STANDING):  aMIOdarone Infusion 0.5 mG/Min (16.7 mL/Hr) IV Continuous <Continuous>  dexAMETHasone  Injectable 6 milliGRAM(s) IV Push daily  diltiazem    milliGRAM(s) Oral daily  enoxaparin Injectable 100 milliGRAM(s) SubCutaneous every 12 hours  furosemide    Tablet 40 milliGRAM(s) Oral daily  influenza   Vaccine 0.5 milliLiter(s) IntraMuscular once  insulin glargine Injectable (LANTUS) 18 Unit(s) SubCutaneous at bedtime  insulin lispro (ADMELOG) corrective regimen sliding scale   SubCutaneous three times a day before meals  insulin lispro (ADMELOG) corrective regimen sliding scale   SubCutaneous at bedtime  insulin lispro Injectable (ADMELOG) 8 Unit(s) SubCutaneous three times a day before meals  metoprolol tartrate Injectable 5 milliGRAM(s) IV Push every 6 hours  pantoprazole    Tablet 40 milliGRAM(s) Oral before breakfast  simvastatin 20 milliGRAM(s) Oral at bedtime  tamsulosin 0.4 milliGRAM(s) Oral at bedtime    MEDICATIONS  (PRN):  acetaminophen   Tablet .. 650 milliGRAM(s) Oral every 4 hours PRN Temp greater or equal to 38.5C (101.3F)  metoprolol tartrate Injectable 5 milliGRAM(s) IV Push every 15 minutes PRN tachy >120  sodium chloride 0.65% Nasal 1 Spray(s) Both Nostrils two times a day PRN Nasal Congestion          Xrays:  TLC:  OG:  ET tube:                                                                                    mild b/l opacity    ECHO:  [facility] ICU:           Patient is a 76y old  Male who presents with a chief complaint of COVID pneumonia (15 Feb 2021 09:57)      Over Night Events:  Patient seen and examined.   rapid afib on amiodarone IV started   on bipap     ROS:  See HPI    PHYSICAL EXAM    ICU Vital Signs Last 24 Hrs  T(C): 37.2 (20 Feb 2021 06:04), Max: 37.3 (19 Feb 2021 09:14)  T(F): 98.9 (20 Feb 2021 06:04), Max: 99.2 (19 Feb 2021 09:14)  HR: 137 (20 Feb 2021 07:38) (74 - 160)  BP: 109/56 (20 Feb 2021 07:38) (87/56 - 123/77)  BP(mean): 101 (19 Feb 2021 14:02) (101 - 101)  ABP: --  ABP(mean): --  RR: 18 (20 Feb 2021 07:38) (18 - 25)  SpO2: 97% (20 Feb 2021 07:38) (97% - 100%)      General: awake   HEENT:       sabrina         Lymph Nodes: NO cervical LN   Lungs: Bilateral BS  Cardiovascular: Regular   Abdomen: Soft, Positive BS  Extremities: No clubbing   Skin: warm   Neurological: no focal   Musculoskeletal: move all ext     I&O's Detail    19 Feb 2021 07:01  -  20 Feb 2021 07:00  --------------------------------------------------------  IN:    Amiodarone: 66.6 mL  Total IN: 66.6 mL    OUT:  Total OUT: 0 mL    Total NET: 66.6 mL          LABS:                          17.1   17.39 )-----------( 135      ( 19 Feb 2021 20:00 )             50.4         19 Feb 2021 06:05    144    |  107    |  38     ----------------------------<  184    4.8     |  20     |  1.1      Ca    8.6        19 Feb 2021 06:05                                               PT/INR - ( 19 Feb 2021 20:00 )   PT: 37.50 sec;   INR: 3.26 ratio         PTT - ( 19 Feb 2021 06:05 )  PTT:45.9 sec                                                                                                                                                                                        MEDICATIONS  (STANDING):  aMIOdarone Infusion 0.5 mG/Min (16.7 mL/Hr) IV Continuous <Continuous>  dexAMETHasone  Injectable 6 milliGRAM(s) IV Push daily  diltiazem    milliGRAM(s) Oral daily  enoxaparin Injectable 100 milliGRAM(s) SubCutaneous every 12 hours  furosemide    Tablet 40 milliGRAM(s) Oral daily  influenza   Vaccine 0.5 milliLiter(s) IntraMuscular once  insulin glargine Injectable (LANTUS) 18 Unit(s) SubCutaneous at bedtime  insulin lispro (ADMELOG) corrective regimen sliding scale   SubCutaneous three times a day before meals  insulin lispro (ADMELOG) corrective regimen sliding scale   SubCutaneous at bedtime  insulin lispro Injectable (ADMELOG) 8 Unit(s) SubCutaneous three times a day before meals  metoprolol tartrate Injectable 5 milliGRAM(s) IV Push every 6 hours  pantoprazole    Tablet 40 milliGRAM(s) Oral before breakfast  simvastatin 20 milliGRAM(s) Oral at bedtime  tamsulosin 0.4 milliGRAM(s) Oral at bedtime    MEDICATIONS  (PRN):  acetaminophen   Tablet .. 650 milliGRAM(s) Oral every 4 hours PRN Temp greater or equal to 38.5C (101.3F)  metoprolol tartrate Injectable 5 milliGRAM(s) IV Push every 15 minutes PRN tachy >120  sodium chloride 0.65% Nasal 1 Spray(s) Both Nostrils two times a day PRN Nasal Congestion          Xrays:  TLC:  OG:  ET tube:                                                                                    mild b/l opacity    ECHO:  [facility] ICU:

## 2021-02-21 NOTE — PROGRESS NOTE ADULT - SUBJECTIVE AND OBJECTIVE BOX
SUBJECTIVE:    Patient is a 76y old Male who presents with a chief complaint of COVID-19 pneumonia (21 Feb 2021 11:31)    Currently admitted to medicine with the primary diagnosis of COVID-19       Today is hospital day 6d.     PAST MEDICAL & SURGICAL HISTORY  Gastroesophageal reflux disease    Enlarged liver    BPH (benign prostatic hyperplasia)    Essential hypertension    Heart valve disorder    History of back surgery      ALLERGIES:  No Known Allergies    MEDICATIONS:  STANDING MEDICATIONS  aMIOdarone Infusion 0.501 mG/Min IV Continuous <Continuous>  dexAMETHasone  Injectable 6 milliGRAM(s) IV Push daily  diltiazem Infusion 10 mG/Hr IV Continuous <Continuous>  furosemide    Tablet 40 milliGRAM(s) Oral daily  heparin  Infusion 1200 Unit(s)/Hr IV Continuous <Continuous>  influenza   Vaccine 0.5 milliLiter(s) IntraMuscular once  insulin glargine Injectable (LANTUS) 18 Unit(s) SubCutaneous at bedtime  insulin lispro (ADMELOG) corrective regimen sliding scale   SubCutaneous three times a day before meals  insulin lispro (ADMELOG) corrective regimen sliding scale   SubCutaneous at bedtime  insulin lispro Injectable (ADMELOG) 8 Unit(s) SubCutaneous three times a day before meals  meropenem  IVPB 1000 milliGRAM(s) IV Intermittent every 12 hours  metoprolol tartrate Injectable 5 milliGRAM(s) IV Push every 8 hours  pantoprazole    Tablet 40 milliGRAM(s) Oral before breakfast  simvastatin 20 milliGRAM(s) Oral at bedtime  tamsulosin 0.4 milliGRAM(s) Oral at bedtime    PRN MEDICATIONS  acetaminophen   Tablet .. 650 milliGRAM(s) Oral every 4 hours PRN  sodium chloride 0.65% Nasal 1 Spray(s) Both Nostrils two times a day PRN    VITALS:   T(F): 98.8  HR: 140  BP: 102/72  RR: 20  SpO2: 95%    LABS:                        16.5   19.57 )-----------( 224      ( 21 Feb 2021 08:47 )             49.3     02-21    140  |  101  |  102<HH>  ----------------------------<  317<H>  5.0   |  19  |  2.7<H>    Ca    8.2<L>      21 Feb 2021 04:30    TPro  6.5  /  Alb  3.0<L>  /  TBili  0.8  /  DBili  x   /  AST  34  /  ALT  18  /  AlkPhos  141<H>  02-21    PT/INR - ( 21 Feb 2021 08:47 )   PT: 14.90 sec;   INR: 1.30 ratio         PTT - ( 21 Feb 2021 08:47 )  PTT:78.3 sec    ABG - ( 20 Feb 2021 15:59 )  pH, Arterial: 7.46  pH, Blood: x     /  pCO2: 32    /  pO2: 73    / HCO3: 22    / Base Excess: -0.3  /  SaO2: 94                Lactate, Blood: 3.7 mmol/L <H> (02-20-21 @ 20:05)          RADIOLOGY:    PHYSICAL EXAM:  GEN: No acute distress  LUNGS: Clear to auscultation bilaterally   HEART: S1/S2 present. RRR.   ABD/ GI: Soft, non-tender, non-distended. Bowel sounds present  EXT: NC/NC/NE/2+PP/FERRER  NEURO: AAOX3

## 2021-02-21 NOTE — PROGRESS NOTE ADULT - SUBJECTIVE AND OBJECTIVE BOX
SUBJ:  Chart reviewed.  Events noted.  Spoke to house staff.    MEDICATIONS  (STANDING):  aMIOdarone Infusion 0.501 mG/Min (16.7 mL/Hr) IV Continuous <Continuous>  dexAMETHasone  Injectable 6 milliGRAM(s) IV Push daily  diltiazem Infusion 5 mG/Hr (5 mL/Hr) IV Continuous <Continuous>  furosemide    Tablet 40 milliGRAM(s) Oral daily  heparin  Infusion 1200 Unit(s)/Hr (12 mL/Hr) IV Continuous <Continuous>  influenza   Vaccine 0.5 milliLiter(s) IntraMuscular once  insulin glargine Injectable (LANTUS) 18 Unit(s) SubCutaneous at bedtime  insulin lispro (ADMELOG) corrective regimen sliding scale   SubCutaneous three times a day before meals  insulin lispro (ADMELOG) corrective regimen sliding scale   SubCutaneous at bedtime  insulin lispro Injectable (ADMELOG) 8 Unit(s) SubCutaneous three times a day before meals  meropenem  IVPB 1000 milliGRAM(s) IV Intermittent every 12 hours  metoprolol tartrate Injectable 5 milliGRAM(s) IV Push every 8 hours  pantoprazole    Tablet 40 milliGRAM(s) Oral before breakfast  simvastatin 20 milliGRAM(s) Oral at bedtime  tamsulosin 0.4 milliGRAM(s) Oral at bedtime    MEDICATIONS  (PRN):  acetaminophen   Tablet .. 650 milliGRAM(s) Oral every 4 hours PRN Temp greater or equal to 38.5C (101.3F)  sodium chloride 0.65% Nasal 1 Spray(s) Both Nostrils two times a day PRN Nasal Congestion            Vital Signs Last 24 Hrs  T(C): 36.2 (21 Feb 2021 08:43), Max: 37.2 (20 Feb 2021 11:42)  T(F): 97.1 (21 Feb 2021 08:43), Max: 98.9 (20 Feb 2021 11:42)  HR: 130 (21 Feb 2021 10:00) (111 - 151)  BP: 114/67 (21 Feb 2021 10:00) (97/74 - 158/90)  BP(mean): 88 (21 Feb 2021 04:00) (82 - 102)  RR: 22 (21 Feb 2021 10:00) (18 - 33)  SpO2: 86% (21 Feb 2021 10:00) (86% - 100%)    TELEMETRY: Atrial fibrillation with moderately rapid ventricular rate.    ECG:    TTE:    LABS:                        16.5   19.57 )-----------( 224      ( 21 Feb 2021 08:47 )             49.3     02-21    140  |  101  |  102<HH>  ----------------------------<  317<H>  5.0   |  19  |  2.7<H>    Ca    8.2<L>      21 Feb 2021 04:30    TPro  6.5  /  Alb  3.0<L>  /  TBili  0.8  /  DBili  x   /  AST  34  /  ALT  18  /  AlkPhos  141<H>  02-21        PT/INR - ( 21 Feb 2021 08:47 )   PT: 14.90 sec;   INR: 1.30 ratio         PTT - ( 21 Feb 2021 08:47 )  PTT:78.3 sec    I&O's Summary    20 Feb 2021 07:01  -  21 Feb 2021 07:00  --------------------------------------------------------  IN: 875.1 mL / OUT: 800 mL / NET: 75.1 mL    21 Feb 2021 07:01  -  21 Feb 2021 11:32  --------------------------------------------------------  IN: 131.1 mL / OUT: 0 mL / NET: 131.1 mL      BNPSerum Pro-Brain Natriuretic Peptide: 1086 pg/mL (02-20 @ 12:27)    RADIOLOGY & ADDITIONAL STUDIES:    IMPRESSION AND PLAN:    1) Persistent atrial fibrillation, presently rate is not controlled due to hypoxic respiratory failure   Continue amiodarone.  Increase cardizem drip to 10mg/hour.  If needed give digoxin 0.25 mg IVP X 2 doses.  Continue heparin drip.    2)DVT and possible PE  Continue heparin drip.    3)Hypoxic respiratory failure COVID - 19 pneumonia on BIPAP.  F/U Pulmonary and critical care recommendation.

## 2021-02-21 NOTE — PROGRESS NOTE ADULT - ASSESSMENT
# Ac hypoxic resp failure sec to covid pNA-- patient on  Bipap  CXR better today-- after diuresis-- oxygenation 98 % but desaturates on high flow   Diagnosed on 2/11 on dexa day 6  RDV completed course-- initial dose was given in [place]    # ac DVT< from: [facility] Duplex Lower Ext Vein Scan, Bilat (02.19.21 @ 16:50) >  Acute deep vein thrombosis of the left common femoral, femoral and popliteal veins. Right small saphenous vein superficial thrombosis.  Left gastrocnemius venous sinus thrombosis.  was started  on heparin based on creatinine..    #  possible DIC--supratherapeutic INR-- developed clots on it -- hematology consulted-- given  Vit K  X2 and PTT along with INR stabilized-- currently on heparin .Fribrin degradation products in lab.-- hematology has being following the case..    #  chr a fib on coumadin-- inr  now normal-- coumadin is still on hold currently-- rate not controlled--  cardizem drip IV lowered today as BP is low and continued on amiodarone--  seen by cardiology.    # PRAVIN sec to use of lasix yesterday-- and patient being NPO from a few days due to Bipap.  #DM 2 on insulin.   # hx of nasal sinus congestion worsened by high flow oxygen-- on normal saline drops.      I called  [facility]-- [phone]-- left a message.

## 2021-02-21 NOTE — PROGRESS NOTE ADULT - SUBJECTIVE AND OBJECTIVE BOX
Patient is a 76y old  Male who presents with a chief complaint of COVID -  19 infection. (20 Feb 2021 16:23)      Over Night Events:  Patient seen and examined.   on bipap pox 100 % now   still afib rapid 125    ROS:  See HPI    PHYSICAL EXAM    ICU Vital Signs Last 24 Hrs  T(C): 36.1 (21 Feb 2021 04:00), Max: 37.2 (20 Feb 2021 11:42)  T(F): 96.9 (21 Feb 2021 04:00), Max: 98.9 (20 Feb 2021 11:42)  HR: 126 (21 Feb 2021 07:00) (111 - 151)  BP: 97/74 (21 Feb 2021 07:00) (97/74 - 158/90)  BP(mean): 88 (21 Feb 2021 04:00) (82 - 102)  ABP: --  ABP(mean): --  RR: 27 (21 Feb 2021 07:00) (18 - 33)  SpO2: 100% (21 Feb 2021 07:00) (89% - 100%)      General:awake   HEENT:  sabrina              Lymph Nodes: NO cervical LN   Lungs: Bilateral BS  Cardiovascular: Regular   Abdomen: Soft, Positive BS  Extremities: No clubbing   Skin: warm   Neurological: no focal   Musculoskeletal: move all ext     I&O's Detail    20 Feb 2021 07:01  -  21 Feb 2021 07:00  --------------------------------------------------------  IN:    Amiodarone: 283.9 mL    Amiodarone: 100.2 mL    Diltiazem: 190 mL    Heparin: 36 mL    Heparin: 165 mL    IV PiggyBack: 100 mL  Total IN: 875.1 mL    OUT:    Voided (mL): 800 mL  Total OUT: 800 mL    Total NET: 75.1 mL          LABS:                          17.0   20.11 )-----------( 175      ( 20 Feb 2021 12:27 )             52.3         20 Feb 2021 20:05    140    |  103    |  84     ----------------------------<  258    4.7     |  21     |  2.0      Ca    8.7        20 Feb 2021 20:05    TPro  6.5    /  Alb  2.8    /  TBili  0.8    /  DBili  x      /  AST  37     /  ALT  22     /  AlkPhos  137    20 Feb 2021 16:51  Amylase x     lipase x                                                 PT/INR - ( 20 Feb 2021 20:05 )   PT: 16.70 sec;   INR: 1.45 ratio         PTT - ( 21 Feb 2021 01:50 )  PTT:77.0 sec                                           Lactate, Blood: 3.7 mmol/L (02-20-21 @ 20:05)                                                                                                                                           ABG - ( 20 Feb 2021 15:59 )  pH, Arterial: 7.46  pH, Blood: x     /  pCO2: 32    /  pO2: 73    / HCO3: 22    / Base Excess: -0.3  /  SaO2: 94                  MEDICATIONS  (STANDING):  aMIOdarone Infusion 0.501 mG/Min (16.7 mL/Hr) IV Continuous <Continuous>  dexAMETHasone  Injectable 6 milliGRAM(s) IV Push daily  diltiazem Infusion 5 mG/Hr (5 mL/Hr) IV Continuous <Continuous>  furosemide    Tablet 40 milliGRAM(s) Oral daily  heparin  Infusion 1200 Unit(s)/Hr (12 mL/Hr) IV Continuous <Continuous>  influenza   Vaccine 0.5 milliLiter(s) IntraMuscular once  insulin glargine Injectable (LANTUS) 18 Unit(s) SubCutaneous at bedtime  insulin lispro (ADMELOG) corrective regimen sliding scale   SubCutaneous three times a day before meals  insulin lispro (ADMELOG) corrective regimen sliding scale   SubCutaneous at bedtime  insulin lispro Injectable (ADMELOG) 8 Unit(s) SubCutaneous three times a day before meals  meropenem  IVPB 1000 milliGRAM(s) IV Intermittent every 12 hours  metoprolol tartrate Injectable 5 milliGRAM(s) IV Push every 8 hours  pantoprazole    Tablet 40 milliGRAM(s) Oral before breakfast  simvastatin 20 milliGRAM(s) Oral at bedtime  tamsulosin 0.4 milliGRAM(s) Oral at bedtime    MEDICATIONS  (PRN):  acetaminophen   Tablet .. 650 milliGRAM(s) Oral every 4 hours PRN Temp greater or equal to 38.5C (101.3F)  sodium chloride 0.65% Nasal 1 Spray(s) Both Nostrils two times a day PRN Nasal Congestion          Xrays:  TLC:  OG:  ET tube:                                                                                    stable b/l oapcity    ECHO:  CAM ICU:

## 2021-02-21 NOTE — PROGRESS NOTE ADULT - ASSESSMENT
IMPRESSION:  Acute hypoxemic respiratory failure  COVID-19 PNA  HO SMILEY, noncompliant with CPAP  HO Afib, on coumadin at home  DVT ?? PE   PRAVIN   PLAN:    CNS: Avoid CNS depressants.     HEENT: Oral care    PULMONARY:  HOB @ 45 degrees.  Aspiration precautions. AVAPS taper to keep pox >92 %  Continue with decadron, D#7.      CARDIOVASCULAR: Avoid volume overload. Repeat BNP. Rate control on amiodarone iv while npo on bipap   give cardizem 10 push now   try to wean off bipap if tolerate resume home meds     GI: GI prophylaxis.  Feeding as tolerated when off AVAPS.  Bowel regimen     RENAL:  Follow up lytes.  Correct as needed. follow morning BMP   hold lasix for now     INFECTIOUS: DISEASE: PanCx. Repeat procal. Check nasal MRSA. Check urine strep/ legionella. Trend inflammatory markers. FU ID.    merop   HEMATOLOGICAL:  DVT prophylaxis. heparin drip keep ptt 60   follow h/h , plt also     ENDOCRINE:  Follow up FS.  Insulin protocol if needed.    MUSCULOSKELETAL: bedrest    Overall poor prognosis    Step Down Unit monitoring for now

## 2021-02-22 NOTE — DIETITIAN INITIAL EVALUATION ADULT. - FACTORS AFF FOOD INTAKE
poor respiratory status causing difficulty tolerating tough to chew foods as per RN. no BM noted since 2/17.

## 2021-02-22 NOTE — DIETITIAN INITIAL EVALUATION ADULT. - ORAL INTAKE PTA/DIET HISTORY
Unable to conduct face to face assessment d/t COVID-19 isolation precautions. Unable to reach pt via room phone or emergency contact via phone number in EMR. Unable to obtain nutrition hx. As per h&p pt w. increasing weakness x1 week PTA.

## 2021-02-22 NOTE — DIETITIAN INITIAL EVALUATION ADULT. - PERTINENT LABORATORY DATA
(2/22/21) WBC 20.46, POCT 237/301, glucose 266, Na 133, Cl 95, , Cr 2.7, corrected Ca 9.1, GFR 22, Mg 3.4

## 2021-02-22 NOTE — PROGRESS NOTE ADULT - SUBJECTIVE AND OBJECTIVE BOX
SUBJECTIVE:    Patient is a 76y old Male who presents with a chief complaint of COVID-19 pneumonia (21 Feb 2021 11:31)    Currently admitted to medicine with the primary diagnosis of COVID-19       Today is hospital day 7d. This morning he is resting comfortably in bed and reports no new issues or overnight events.     PAST MEDICAL & SURGICAL HISTORY  Gastroesophageal reflux disease    Enlarged liver    BPH (benign prostatic hyperplasia)    Essential hypertension    Heart valve disorder    History of back surgery      ALLERGIES:  No Known Allergies    MEDICATIONS:  STANDING MEDICATIONS  aMIOdarone Infusion 0.501 mG/Min IV Continuous <Continuous>  aMIOdarone Infusion 0.501 mG/Min IV Continuous <Continuous>  dexAMETHasone  Injectable 6 milliGRAM(s) IV Push daily  diltiazem Infusion 10 mG/Hr IV Continuous <Continuous>  furosemide    Tablet 40 milliGRAM(s) Oral daily  heparin  Infusion 1200 Unit(s)/Hr IV Continuous <Continuous>  influenza   Vaccine 0.5 milliLiter(s) IntraMuscular once  insulin glargine Injectable (LANTUS) 18 Unit(s) SubCutaneous at bedtime  insulin lispro (ADMELOG) corrective regimen sliding scale   SubCutaneous three times a day before meals  insulin lispro (ADMELOG) corrective regimen sliding scale   SubCutaneous at bedtime  insulin lispro Injectable (ADMELOG) 8 Unit(s) SubCutaneous three times a day before meals  meropenem  IVPB 1000 milliGRAM(s) IV Intermittent every 12 hours  metoprolol tartrate Injectable 5 milliGRAM(s) IV Push every 8 hours  pantoprazole    Tablet 40 milliGRAM(s) Oral before breakfast  simvastatin 20 milliGRAM(s) Oral at bedtime  tamsulosin 0.4 milliGRAM(s) Oral at bedtime    PRN MEDICATIONS  acetaminophen   Tablet .. 650 milliGRAM(s) Oral every 4 hours PRN  sodium chloride 0.65% Nasal 1 Spray(s) Both Nostrils two times a day PRN    VITALS:   T(F): 96.9  HR: 109  BP: 126/82  RR: 25  SpO2: 93%    LABS:                        16.5   19.57 )-----------( 224      ( 21 Feb 2021 08:47 )             49.3     02-21    140  |  101  |  102<HH>  ----------------------------<  317<H>  5.0   |  19  |  2.7<H>    Ca    8.2<L>      21 Feb 2021 04:30    TPro  6.5  /  Alb  3.0<L>  /  TBili  0.8  /  DBili  x   /  AST  34  /  ALT  18  /  AlkPhos  141<H>  02-21    PT/INR - ( 21 Feb 2021 21:46 )   PT: 14.30 sec;   INR: 1.24 ratio         PTT - ( 22 Feb 2021 01:00 )  PTT:61.2 sec    ABG - ( 20 Feb 2021 15:59 )  pH, Arterial: 7.46  pH, Blood: x     /  pCO2: 32    /  pO2: 73    / HCO3: 22    / Base Excess: -0.3  /  SaO2: 94                        RADIOLOGY:    PHYSICAL EXAM:  GEN: No acute distress  LUNGS: Clear to auscultation bilaterally   HEART: Regular  ABD: Soft, non-tender, non-distended.  EXT: NC/NC/NE/2+PP/FERRER/Skin Intact.   NEURO: AAOX3    Intravenous access:   NG tube:   Haines Catheter:        SUBJECTIVE:    Patient is a 76y old Male who presents with a chief complaint of COVID-19 pneumonia (21 Feb 2021 11:31)    Currently admitted to medicine with the primary diagnosis of COVID-19       Today is hospital day 7d. Afib remains rate uncontrolled, will give 1 dose of digoxin today as per cardio recs    PAST MEDICAL & SURGICAL HISTORY  Gastroesophageal reflux disease    Enlarged liver    BPH (benign prostatic hyperplasia)    Essential hypertension    Heart valve disorder    History of back surgery      ALLERGIES:  No Known Allergies    MEDICATIONS:  STANDING MEDICATIONS  aMIOdarone Infusion 0.501 mG/Min IV Continuous <Continuous>  aMIOdarone Infusion 0.501 mG/Min IV Continuous <Continuous>  dexAMETHasone  Injectable 6 milliGRAM(s) IV Push daily  diltiazem Infusion 10 mG/Hr IV Continuous <Continuous>  furosemide    Tablet 40 milliGRAM(s) Oral daily  heparin  Infusion 1200 Unit(s)/Hr IV Continuous <Continuous>  influenza   Vaccine 0.5 milliLiter(s) IntraMuscular once  insulin glargine Injectable (LANTUS) 18 Unit(s) SubCutaneous at bedtime  insulin lispro (ADMELOG) corrective regimen sliding scale   SubCutaneous three times a day before meals  insulin lispro (ADMELOG) corrective regimen sliding scale   SubCutaneous at bedtime  insulin lispro Injectable (ADMELOG) 8 Unit(s) SubCutaneous three times a day before meals  meropenem  IVPB 1000 milliGRAM(s) IV Intermittent every 12 hours  metoprolol tartrate Injectable 5 milliGRAM(s) IV Push every 8 hours  pantoprazole    Tablet 40 milliGRAM(s) Oral before breakfast  simvastatin 20 milliGRAM(s) Oral at bedtime  tamsulosin 0.4 milliGRAM(s) Oral at bedtime    PRN MEDICATIONS  acetaminophen   Tablet .. 650 milliGRAM(s) Oral every 4 hours PRN  sodium chloride 0.65% Nasal 1 Spray(s) Both Nostrils two times a day PRN    VITALS:   T(F): 96.9  HR: 109  BP: 126/82  RR: 25  SpO2: 93%    LABS:                        16.5   19.57 )-----------( 224      ( 21 Feb 2021 08:47 )             49.3     02-21    140  |  101  |  102<HH>  ----------------------------<  317<H>  5.0   |  19  |  2.7<H>    Ca    8.2<L>      21 Feb 2021 04:30    TPro  6.5  /  Alb  3.0<L>  /  TBili  0.8  /  DBili  x   /  AST  34  /  ALT  18  /  AlkPhos  141<H>  02-21    PT/INR - ( 21 Feb 2021 21:46 )   PT: 14.30 sec;   INR: 1.24 ratio         PTT - ( 22 Feb 2021 01:00 )  PTT:61.2 sec    ABG - ( 20 Feb 2021 15:59 )  pH, Arterial: 7.46  pH, Blood: x     /  pCO2: 32    /  pO2: 73    / HCO3: 22    / Base Excess: -0.3  /  SaO2: 94                        RADIOLOGY:  Xray Chest 2/12:  Bilateral opacities    Xray Chest 2/21:  Slightly improving diffuse bilateral parenchymal opacities.    [facility] Duplex Lower Ext Vein Scan, Bilat 2/19:  Acute deep vein thrombosis of the left common femoral, femoral and popliteal veins. Right small saphenous vein superficial thrombosis.  Left gastrocnemius venous sinus thrombosis.      PHYSICAL EXAM:  GEN: No acute distress  LUNGS: Diminshed air entry bilaterally  CARDIO: tachycardic, S1&S2, no murmurs appreciated  ABD: Soft, non-tender, non-distended.  EXT: LLE edema  NEURO: AAOX3

## 2021-02-22 NOTE — PROGRESS NOTE ADULT - SUBJECTIVE AND OBJECTIVE BOX
SUBJECTIVE:    Patient is a 76y old Male who presents with a chief complaint of COVID-19 pneumonia (22 Feb 2021 14:53)    Currently admitted to medicine with the primary diagnosis of COVID-19       Today is hospital day 7d.     PAST MEDICAL & SURGICAL HISTORY  Gastroesophageal reflux disease    Enlarged liver    BPH (benign prostatic hyperplasia)    Essential hypertension    Heart valve disorder    History of back surgery      ALLERGIES:  No Known Allergies    MEDICATIONS:  STANDING MEDICATIONS  aMIOdarone Infusion 0.501 mG/Min IV Continuous <Continuous>  aMIOdarone Infusion 0.501 mG/Min IV Continuous <Continuous>  dexAMETHasone  Injectable 6 milliGRAM(s) IV Push daily  diltiazem Infusion 10 mG/Hr IV Continuous <Continuous>  furosemide    Tablet 40 milliGRAM(s) Oral daily  heparin  Infusion 1200 Unit(s)/Hr IV Continuous <Continuous>  influenza   Vaccine 0.5 milliLiter(s) IntraMuscular once  insulin glargine Injectable (LANTUS) 30 Unit(s) SubCutaneous at bedtime  insulin lispro (ADMELOG) corrective regimen sliding scale   SubCutaneous three times a day before meals  insulin lispro (ADMELOG) corrective regimen sliding scale   SubCutaneous at bedtime  insulin lispro Injectable (ADMELOG) 12 Unit(s) SubCutaneous three times a day before meals  meropenem  IVPB 1000 milliGRAM(s) IV Intermittent every 12 hours  metoprolol tartrate Injectable 5 milliGRAM(s) IV Push every 8 hours  pantoprazole    Tablet 40 milliGRAM(s) Oral before breakfast  simvastatin 20 milliGRAM(s) Oral at bedtime  tamsulosin 0.4 milliGRAM(s) Oral at bedtime    PRN MEDICATIONS  acetaminophen   Tablet .. 650 milliGRAM(s) Oral every 4 hours PRN  sodium chloride 0.65% Nasal 1 Spray(s) Both Nostrils two times a day PRN    VITALS:   T(F): 96.8  HR: 118  BP: 108/73  RR: 20  SpO2: 91%    LABS:                        16.4   20.46 )-----------( 224      ( 22 Feb 2021 08:07 )             48.8     02-22    133<L>  |  95<L>  |  107<HH>  ----------------------------<  266<H>  4.9   |  21  |  2.7<H>    Ca    8.3<L>      22 Feb 2021 08:07  Mg     3.4     02-22    TPro  6.6  /  Alb  3.0<L>  /  TBili  0.9  /  DBili  x   /  AST  39  /  ALT  18  /  AlkPhos  144<H>  02-22    PT/INR - ( 22 Feb 2021 08:07 )   PT: 14.50 sec;   INR: 1.26 ratio         PTT - ( 22 Feb 2021 08:07 )  PTT:79.8 sec              RADIOLOGY:    PHYSICAL EXAM:  GEN: No acute distress  LUNGS: Clear to auscultation bilaterally   HEART: S1/S2 present. RRR.   ABD/ GI: Soft, non-tender, non-distended. Bowel sounds present  EXT: NC/NC/NE/2+PP/FERRER  NEURO: AAOX3

## 2021-02-22 NOTE — PROGRESS NOTE ADULT - ASSESSMENT
IMPRESSION:  Acute hypoxemic respiratory failure  COVID-19 PNA  HO SMILEY, noncompliant with CPAP  HO Afib, on coumadin at home  DVT ?? PE   PRAVIN       PLAN:    CNS: Avoid CNS depressants.     HEENT: Oral care    PULMONARY:  HOB @ 45 degrees.  Aspiration precautions.   AVAPS taper to keep pox >92 %    Continue with decadron, D#7.   npo on NIV     CARDIOVASCULAR: Avoid volume overload. Repeat BNP. Rate control on amiodarone iv while  try to wean off bipap if tolerate resume home meds     GI: GI prophylaxis.  Feeding as tolerated when off AVAPS.  Bowel regimen     RENAL:  Follow up lytes.  Correct as needed. follow morning BMP   hold lasix for now     INFECTIOUS: DISEASE: PanCx. Repeat procal.   Check nasal MRSA. Check urine strep/ legionella.   Trend inflammatory markers. FU ID.  merop     HEMATOLOGICAL:  DVT prophylaxis. heparin drip keep ptt 60   follow h/h , plt also     ENDOCRINE:  Follow up FS.  Insulin protocol if needed.    MUSCULOSKELETAL: bedrest    Overall poor prognosis    CE[facility]

## 2021-02-22 NOTE — PROGRESS NOTE ADULT - ASSESSMENT
76 year old Male with a past medical history of HTN, HLD, BPH, Hepatomegaly, "Heart valve disorder" ?Afib on Coumadin, presented to the ER with a chief complaint of Weakness and shortness of breath x 1 week, covid + x 1 day. Patient is being admitted for increased weakness secondary to COVID infection     IMPRESSION;  COVID 19 with severe illness. SpO2 < 94% on RA and need for supplemental O2. HFNC and NRB  Pt was  in the early viral replicative phase based on the timeline/onset of symptoms.   COVID-19 antibodies NG  procalcitonin 0.08  , not suggestive of a bacterial PNA.  Ferritin 1246>1231>1231  CRP 7.70 >5.9>13.5  Ddimers 197>2959 ( suggestive of a pulmonary embolus )    s/p RDV   s/p plasma    RECOMMENDATIONS;  CT chest angio  Target SpO2 92 % to 96 %  Dexamethasone 6 mg iv q24h for 10 days. since 2/13  Monitor for side effects: hyperglycemia, neurological ( agitation/confusion), adrenal suppression, bacterial and fungal infections  Anticoagulation as per team

## 2021-02-22 NOTE — PROGRESS NOTE ADULT - SUBJECTIVE AND OBJECTIVE BOX
Patient is a 76y old  Male who presents with a chief complaint of COVID-19 pneumonia (21 Feb 2021 11:31)        HPI:    Pt evaluated on AM rounds.  Interval Events: No overnight events.    REVIEW OF SYSTEMS:   see HPI      OBJECTIVE:  ICU Vital Signs Last 24 Hrs  T(C): 36.1 (21 Feb 2021 20:00), Max: 37.1 (21 Feb 2021 11:59)  T(F): 96.9 (21 Feb 2021 20:00), Max: 98.8 (21 Feb 2021 11:59)  HR: 104 (22 Feb 2021 02:00) (95 - 140)  BP: 138/85 (21 Feb 2021 23:00) (96/74 - 138/85)    RR: 25 (21 Feb 2021 23:00) (18 - 27)  SpO2: 93% (21 Feb 2021 23:00) (86% - 100%)        02-20 @ 07:01  - 02-21 @ 07:00  --------------------------------------------------------  IN: 875.1 mL / OUT: 800 mL / NET: 75.1 mL    02-21 @ 07:01  -  02-22 @ 06:55  --------------------------------------------------------  IN: 690.2 mL / OUT: 350 mL / NET: 340.2 mL      CAPILLARY BLOOD GLUCOSE      POCT Blood Glucose.: 239 mg/dL (22 Feb 2021 06:37)        PHYSICAL EXAM:     · CONSTITUTIONAL:   not septic appearing,   well nourished,   NAD    · ENMT:   Airway patent,   Nasal mucosa clear.  Mouth with normal mucosa.   No thrush    · EYES:   Clear bilaterally,   pupils equal,   round and reactive to light.    · CARDIAC:   Normal rate,   regular rhythm.    Heart sounds S1, S2.   No murmurs, no rubs or gallops on auscultation  no edema        CAROTID:   normal systolic impulse  no bruits    · RESPIRATORY:   rales  normal chest expansion  tachypnea,  no retractions or use of accessory muscles  palpation of chest is normal with no fremitus  percussion of chest demonstrates no hyperresonance or dullness    · GASTROINTESTINAL:  Abdomen soft,   non-tender,   + BS  liver/spleen not palpable    · MUSCULOSKELETAL:   no clubbing, cyanosis      · SKIN:   Skin normal color for race,   warm, dry   No evidence of rash.      · HEME LYMPH:   no splenomegaly.  No cervical  lymphadenopathy.  no inguinal lymphadenopathy    HOSPITAL MEDICATIONS:  MEDICATIONS  (STANDING):  aMIOdarone Infusion 0.501 mG/Min (16.7 mL/Hr) IV Continuous <Continuous>  aMIOdarone Infusion 0.501 mG/Min (16.7 mL/Hr) IV Continuous <Continuous>  dexAMETHasone  Injectable 6 milliGRAM(s) IV Push daily  diltiazem Infusion 10 mG/Hr (10 mL/Hr) IV Continuous <Continuous>  furosemide    Tablet 40 milliGRAM(s) Oral daily  heparin  Infusion 1200 Unit(s)/Hr (13 mL/Hr) IV Continuous <Continuous>  influenza   Vaccine 0.5 milliLiter(s) IntraMuscular once  insulin glargine Injectable (LANTUS) 18 Unit(s) SubCutaneous at bedtime  insulin lispro (ADMELOG) corrective regimen sliding scale   SubCutaneous three times a day before meals  insulin lispro (ADMELOG) corrective regimen sliding scale   SubCutaneous at bedtime  insulin lispro Injectable (ADMELOG) 8 Unit(s) SubCutaneous three times a day before meals  meropenem  IVPB 1000 milliGRAM(s) IV Intermittent every 12 hours  metoprolol tartrate Injectable 5 milliGRAM(s) IV Push every 8 hours  pantoprazole    Tablet 40 milliGRAM(s) Oral before breakfast  simvastatin 20 milliGRAM(s) Oral at bedtime  tamsulosin 0.4 milliGRAM(s) Oral at bedtime    MEDICATIONS  (PRN):  acetaminophen   Tablet .. 650 milliGRAM(s) Oral every 4 hours PRN Temp greater or equal to 38.5C (101.3F)  sodium chloride 0.65% Nasal 1 Spray(s) Both Nostrils two times a day PRN Nasal Congestion        LABS:                        16.5   19.57 )-----------( 224      ( 21 Feb 2021 08:47 )             49.3     02-21    140  |  101  |  102<HH>  ----------------------------<  317<H>  5.0   |  19  |  2.7<H>    Ca    8.2<L>      21 Feb 2021 04:30    TPro  6.5  /  Alb  3.0<L>  /  TBili  0.8  /  DBili  x   /  AST  34  /  ALT  18  /  AlkPhos  141<H>  02-21    PT/INR - ( 21 Feb 2021 21:46 )   PT: 14.30 sec;   INR: 1.24 ratio         PTT - ( 22 Feb 2021 01:00 )  PTT:61.2 sec    Arterial Blood Gas:  02-20 @ 15:59  7.46/32/73/22/94/-0.3  ABG lactate: --  Arterial Blood Gas:  02-20 @ 13:36  7.44/32/48/22/83/-1.3  ABG lactate: --                ABG - ( 20 Feb 2021 15:59 )  pH, Arterial: 7.46  pH, Blood: x     /  pCO2: 32    /  pO2: 73    / HCO3: 22    / Base Excess: -0.3  /  SaO2: 94                  RADIOLOGY: I personally reviewed latest CXR and other pertinent films.

## 2021-02-22 NOTE — DIETITIAN INITIAL EVALUATION ADULT. - OTHER INFO
Pt presented from [facility] d/t hypoxia while undergoing plasma infusion. Hospital course complicated by acute hypoxic respiratory failure 2/2 COVID PNA--on 60L HFNC + 15L NRB; s/p RDV x5d, 1unit plasma 2/13. Acute DVT to L common femoral and peroneal, L gastrocnemius. PRAVIN on CKD--baseline Cr 1.1; likely prerenal d/t decreased PO intake. Chronic Afib, rate uncontrolled--on cardizem & amio drips. T2DM--lantus 30u & lispro 12u TID.

## 2021-02-22 NOTE — PROGRESS NOTE ADULT - ASSESSMENT
# Ac hypoxic resp failure sec to covid pNA-- patient on  Bipap  CXR worsening again  today-- after diuresis-- oxygenation 98% on high flow   Diagnosed on 2/11 on dexa day 7  RDV completed course-- [place] dose was [place]    # ac DVT< from: [facility] Dup[facility] Lower Ext Vein Scan, Bilat (02.19.21 @ 16:50) >  Acute deep vein thrombosis of the left common femoral, femoral and popliteal veins. Right small saphenous vein superficial thrombosis.  Left gastrocnemius venous sinus thrombosis.  was started  on heparin based on creatinine.    #  possible DIC--supratherapeutic INR-- developed clots on it -- hematology consulted-- given  Vit K  X2 and PTT along with INR stabilized-- currently on heparin .Fribrin degradation products in lab.-- hematology has being following the case--- would get some idea if eliquis can be started from hematology..    #  chr a fib on coumadin-- inr  now normal-- coumadin is still on hold currently-- rate not controlled--  cardizem drip IV lowered today as BP is low and continued on amiodarone--  seen by cardiology. was given digitalis X2 today to decrease heart rate.    # PRAVIN sec to use of lasix 2/20-- and patient  now drinking water.  #DM 2 on insulin.    I spoke with wife on the phone today-- patient is better today but continues to remain critical-- she was made aware.

## 2021-02-22 NOTE — DIETITIAN INITIAL EVALUATION ADULT. - ENERGY INTAKE
Poor (<50%) As per RN pt has not been able to tolerate significant portions of PO d/t inability to tolerate HFNC long enough for full meal. Today he was able to but consumed mainly jell-o, soup, soft foods. Recs d/w LIP (Dr. Johnson).

## 2021-02-22 NOTE — DIETITIAN INITIAL EVALUATION ADULT. - CONTINUE CURRENT NUTRITION CARE PLAN
1. Add Glucerna TID & sugar free Prosouce Gelatin BID. 2. Adjust diet order to Dysphagia III mechanical soft, thin liquids + CHO Consistent for optimal tolerance. 3. Check serum vitamin D 25-hydroxy & supplement PRN. 4. Consider adding daily multivitamin w. out minerals.

## 2021-02-22 NOTE — DIETITIAN INITIAL EVALUATION ADULT. - ADD RECOMMEND
RD will monitor diet order, energy intake, nutrition related labs, body composition, NFPF (PO tolerance)

## 2021-02-22 NOTE — PROGRESS NOTE ADULT - ASSESSMENT
76 year old Male with a past medical history of HTN, HLD, BPH, Hepatomegaly, valvular heart disease/ chronic Afib on Coumadin, presented to the ER with a chief complaint of Weakness and shortness of breath x 1 week, covid + x 1 day. Patient is being admitted for increased weakness secondary to COVID infection . Transferred back to [facility] , was dced to [place] after plasma but p[atient became hypoxic.     #Acute hypoxemic respiratory failure secondary to COVID-19 pneumonia- worsening  - COVID-19 PCR positive  - EKG: a fib  - Chest X-ray: b/l opacities, repeat cxr  - Oxygen Therapy: High Flow ( 60L/100%) w/ BiPAP 4 hours on 4 hours off, respiratory aware   - s/p plasma (2/13)  - c/w RDV Day 4  - c/w decadron Day 5  - Inflammatory markers:   ·	 DDimer: 155>>355  >> 197   ·	CRP: 8>>10 > 5.76  ·	Procal:0.06>>0.08   ·	Ferritin: 982>>1137 >> 1246   - Incentive spirometry at bedside, albuterol prn MDI  - DVT prophylaxis per protocol  - supportive measures: antipyretics, antitussives  - Isolation precautions (contact, droplet, airborne)    #valvular heart disease/ Chronic Afib/ essential HTN/ Mixed HLD   - continue coumadin to maintain INR 2-3  - coumadin >3 today, will hold today's dose of coumadin   - daily INR  - c/w Metorpolol succinate 50 mg, lasix 40 mg daily, [id number] CD daily, simvastatin 20mg daily  - cardiologist Dr. Travis    # DMII - uncontrolled  - increasing insulin to 18/8/8/8 + sliding scale (premeal & qhs)   - monitor FS ACHS  - A1C with Estimated Average Glucose Result: 11.0    #GERD  - c/w  protonix     #BPH  - c/w tamsulosin     #DIET: DASH/CC  #DVTppx: coumadin on hold due to supratherapeutic INR   #GIppx: protonix  #Activity: Increase as tolerated  #CODE: FULL, patient was DNR /DNI resented to full code  Dispo home when stable, fully functional at baseline     76 year old Male with a past medical history of HTN, HLD, BPH, Hepatomegaly, valvular heart disease/ chronic Afib on Coumadin, presented to the ER with a chief complaint of Weakness and shortness of breath x 1 week, covid + x 1 day. Patient is being admitted for increased weakness secondary to COVID infection . Transferred back to [facility] , was dced to [facility] after plasma but p[atient became hypoxic.     # Acute hypoxemic respiratory failure secondary to COVID-19 pneumonia  - 2/22 spO2 90% on 100% 60L HFNC and 15 L NRB  - CXR on admission showed bilateral opacities  - Dimer 68>>2959, ferritin 982>>1231, CRP 8.53>>9.66, procal 0.06>>0.13  - s/p 5 day course RDV  - s/p 1 unit of plasma on 2/13  - c/w decadron 6 mg IV daily (start date 2/15)    # Acute DVT  - Duplex 2/19: DVT Lt common femoral and peroneal, Lt gastrocnemius  - c/w heparin drip  - monitor PTT    # Chronic Afib    - rate uncontrolled on cardizem and amio drips  - s/p 1 dose of digoxin 0.25 mg on 2/22  - c/w lopressor 5 mg IV q8  - c/w heparin gtt  - on coumadin at home (valvular heart disease)  - cardiologist Dr. Travis    # DMII  - uncontrolled. A1C 11  - increased lantus to 30 from 18 and lispro to 12 from 8 on 2/22  - monitor FS ACHS    # GERD  - c/w protonix     # BPH  - c/w tamsulosin     # Misc:  DVT ppx: heparin gtt  GI ppx: protonix  Diet: DASH/CC  Activity: IAT  Code status: Full  Dispo: Acute       76 year old Male with a past medical history of HTN, HLD, BPH, Hepatomegaly, valvular heart disease/ chronic Afib on Coumadin, presented to the ER with a chief complaint of Weakness and shortness of breath x 1 week, covid + x 1 day. Patient is being admitted for increased weakness secondary to COVID infection . Transferred back to [facility] , was dced to [facility] after plasma but p[atient became hypoxic.     # Acute hypoxemic respiratory failure secondary to COVID-19 pneumonia  - 2/22 spO2 90% on 100% 60L HFNC and 15 L NRB  - CXR on admission showed bilateral opacities  - Dimer 68>>2959, ferritin 982>>1231, CRP 8.53>>9.66, procal 0.06>>0.13  - s/p 5 day course RDV  - s/p 1 unit of plasma on 2/13  - c/w decadron 6 mg IV daily (start date 2/15)    # Acute DVT  - Duplex 2/19: DVT Lt common femoral and peroneal, Lt gastrocnemius  - c/w heparin drip  - monitor PTT    # PRAVIN on CKD  - Baseline 1.1  - 2/22 Cr 2.7  - monitor (happened after dose of lasix)    # Chronic Afib    - rate uncontrolled on cardizem and amio drips  - s/p 1 dose of digoxin 0.25 mg on 2/22  - c/w lopressor 5 mg IV q8  - c/w heparin gtt  - on coumadin at home (valvular heart disease)  - cardiologist Dr. Travis    # DMII  - uncontrolled. A1C 11  - increased lantus to 30 from 18 and lispro to 12 from 8 on 2/22  - monitor FS ACHS    # GERD  - c/w protonix     # BPH  - c/w tamsulosin     # Misc:  DVT ppx: heparin gtt  GI ppx: protonix  Diet: DASH/CC  Activity: IAT  Code status: Full  Dispo: Acute       76 year old Male with a past medical history of HTN, HLD, BPH, Hepatomegaly, valvular heart disease/ chronic Afib on Coumadin, presented to the ER with a chief complaint of Weakness and shortness of breath x 1 week, covid + x 1 day. Patient is being admitted for increased weakness secondary to COVID infection . Transferred back to [facility] , was dced to [facility] after plasma but p[atient became hypoxic.     # Acute hypoxemic respiratory failure secondary to COVID-19 pneumonia  - 2/22 spO2 90% on 100% 60L HFNC and 15 L NRB  - CXR on admission showed bilateral opacities  - Dimer 68>>2959, ferritin 982>>1231, CRP 8.53>>9.66, procal 0.06>>0.13  - s/p 5 day course RDV  - s/p 1 unit of plasma on 2/13  - c/w decadron 6 mg IV daily (start date 2/15)    # Acute DVT  - Despite supratherapeutic INR on admission (pt on coumadin at home for Afib) hematology consulted-- given  Vit K  X2 and PTT along with INR stabilized  - Duplex 2/19: DVT Lt common femoral and peroneal, Lt gastrocnemius  - c/w heparin drip  - monitor PTT    # PRAVIN on CKD  - Baseline 1.1  - 2/22 Cr 2.7. likely pre-renal, decreased PO intake as pt on NRB  - monitor (happened after dose of lasix)    # Chronic Afib    - rate uncontrolled on cardizem and amio drips  - s/p 1 dose of digoxin 0.25 mg on 2/22  - c/w lopressor 5 mg IV q8  - c/w heparin gtt  - on coumadin at home (valvular heart disease)  - cardiologist Dr. Travis, cardio eval appreciated    # DMII  - uncontrolled. A1C 11  - increased lantus to 30 from 18 and lispro to 12 from 8 on 2/22  - monitor FS ACHS    # GERD  - c/w protonix     # BPH  - c/w tamsulosin     # Misc:  DVT ppx: heparin gtt  GI ppx: protonix  Diet: DASH/CC  Activity: IAT  Code status: Full  Dispo: Acute

## 2021-02-22 NOTE — PROGRESS NOTE ADULT - SUBJECTIVE AND OBJECTIVE BOX
ELIZA POWELL  76y, Male    All available historical data reviewed    OVERNIGHT EVENTS:  no fevers  HFNC and NRB    ROS:  unable to obtain history secondary to patient's mental status and/or sedation     VITALS:  T(F): 97.2, Max: 98 (02-21-21 @ 16:00)  HR: 109  BP: 113/73  RR: 20Vital Signs Last 24 Hrs  T(C): 36.2 (22 Feb 2021 12:00), Max: 36.7 (21 Feb 2021 16:00)  T(F): 97.2 (22 Feb 2021 12:00), Max: 98 (21 Feb 2021 16:00)  HR: 109 (22 Feb 2021 15:00) (104 - 136)  BP: 113/73 (22 Feb 2021 15:00) (97/52 - 138/85)  BP(mean): 88 (22 Feb 2021 15:00) (70 - 95)  RR: 20 (22 Feb 2021 15:00) (20 - 25)  SpO2: 95% (22 Feb 2021 15:00) (90% - 97%)    TESTS & MEASUREMENTS:                        16.4   20.46 )-----------( 224      ( 22 Feb 2021 08:07 )             48.8     02-22    133<L>  |  95<L>  |  107<HH>  ----------------------------<  266<H>  4.9   |  21  |  2.7<H>    Ca    8.3<L>      22 Feb 2021 08:07  Mg     3.4     02-22    TPro  6.6  /  Alb  3.0<L>  /  TBili  0.9  /  DBili  x   /  AST  39  /  ALT  18  /  AlkPhos  144<H>  02-22    LIVER FUNCTIONS - ( 22 Feb 2021 08:07 )  Alb: 3.0 g/dL / Pro: 6.6 g/dL / ALK PHOS: 144 U/L / ALT: 18 U/L / AST: 39 U/L / GGT: x                   RADIOLOGY & ADDITIONAL TESTS:  Personal review of radiological diagnostics performed  Echo and EKG results noted when applicable.     MEDICATIONS:  acetaminophen   Tablet .. 650 milliGRAM(s) Oral every 4 hours PRN  aMIOdarone Infusion 0.501 mG/Min IV Continuous <Continuous>  aMIOdarone Infusion 0.501 mG/Min IV Continuous <Continuous>  dexAMETHasone  Injectable 6 milliGRAM(s) IV Push daily  diltiazem Infusion 10 mG/Hr IV Continuous <Continuous>  furosemide    Tablet 40 milliGRAM(s) Oral daily  heparin  Infusion 1200 Unit(s)/Hr IV Continuous <Continuous>  influenza   Vaccine 0.5 milliLiter(s) IntraMuscular once  insulin glargine Injectable (LANTUS) 30 Unit(s) SubCutaneous at bedtime  insulin lispro (ADMELOG) corrective regimen sliding scale   SubCutaneous three times a day before meals  insulin lispro (ADMELOG) corrective regimen sliding scale   SubCutaneous at bedtime  insulin lispro Injectable (ADMELOG) 12 Unit(s) SubCutaneous three times a day before meals  meropenem  IVPB 1000 milliGRAM(s) IV Intermittent every 12 hours  metoprolol tartrate Injectable 5 milliGRAM(s) IV Push every 8 hours  pantoprazole    Tablet 40 milliGRAM(s) Oral before breakfast  simvastatin 20 milliGRAM(s) Oral at bedtime  sodium chloride 0.65% Nasal 1 Spray(s) Both Nostrils two times a day PRN  tamsulosin 0.4 milliGRAM(s) Oral at bedtime      ANTIBIOTICS:  meropenem  IVPB 1000 milliGRAM(s) IV Intermittent every 12 hours

## 2021-02-23 NOTE — PROGRESS NOTE ADULT - ASSESSMENT
# Ac hypoxic resp failure sec to covid pNA-- patient on  Bipap  CXR better today after Bipap  -- oxygenation 100% on high flow and bipap alternate.   Diagnosed on 2/11 on dexa day 8  RDV completed course-- [place] dose was [place] in [place]    # ac DVT< from: [facility] Duplex Lower Ext Vein Scan, Bilat (02.19.21 @ 16:50) >  Acute deep vein thrombosis of the left common femoral, femoral and popliteal veins. Right small saphenous vein superficial thrombosis.  Left gastrocnemius venous sinus thrombosis.  was started  on heparin based on creatinine.   will change to eliquis after discussion with hematology-- creatinine is now improving     #  possible DIC--supratherapeutic INR-- developed clots on it -- hematology consulted-- given  Vit K  X2 and PTT along with INR stabilized-- currently on heparin .Fribrin degradation products elevated.-- hematology has being following the case--- would get some idea if eliquis can be started from hematology..    #  chr a fib on coumadin-- inr  now normal-- coumadin is still on hold currently-- rate not controlled--  cardizem drip IV drip and continued on amiodarone--  seen by cardiology. was given digitalis X2 yesterday and is on metoprolol 5mg iv push q6h.    # PRAVIN sec to use of lasix 2/20-- and patient  now drinking water. creatinine is now improving.  #DM 2 on insulin.    I called son and updated him about the current status of the patient  [phone]-- Eneida Beauchamp.

## 2021-02-23 NOTE — PROGRESS NOTE ADULT - SUBJECTIVE AND OBJECTIVE BOX
[facility] ([facility])  Patient Name: ELIZA POWELL  Age: 76y  Gender: Male    Past Medical and Surgical History:  Gastroesophageal reflux disease    Enlarged liver    BPH (benign prostatic hyperplasia)    Essential hypertension    Heart valve disorder    History of back surgery        Social History:  Social History:      Allergies:  No Known Allergies      Patient is a 76y old Male who presents with a chief complaint of COVID-19 pneumonia (22 Feb 2021 14:53)    Primary diagnosis of COVID-19        Progress Note  This morning patient was seen and examined at bedside.    Today is hospital day 8d.  Mr. XXX is doing fine.   He reports he had a good night sleep. His XXX (chief complaint) has improved. His appetite is adequate, and he denies nausea or vomiting. He denies any abdominal pain, diarrhea, or constipation. His last bowel movement was soft and was on 03/08/2020 in AM. He is ambulating and denies any shortness of breath, chest pain, palpitations, or light headedness. He is voiding freely/ via zaragoza catheter and denies urinary symptoms (dysuria, urgency, frequency, intermittence).      Vital Signs in the last 24 hours   Vitals Summary T(C): 36.6 (02-23-21 @ 04:00), Max: 36.6 (02-23-21 @ 04:00)  HR: 112 (02-23-21 @ 07:00) (95 - 139)  BP: 105/74 (02-23-21 @ 07:00) (97/52 - 124/76)  RR: 28 (02-23-21 @ 07:00) (20 - 30)  SpO2: 97% (02-23-21 @ 07:00) (88% - 100%)  Vent Data   Intake/ Output   02-22-21 @ 07:01  -  02-23-21 @ 07:00  --------------------------------------------------------  IN: 2103.8 mL / OUT: 1560 mL / NET: 543.8 mL          Physical Exam  * General Appearance: Alert, cooperative, interactive, well-groomed, oriented to time, place, and person, in no acute distress  * Head: Normocephalic, without obvious abnormality, atraumatic  * Lungs: Respirations unlabored, Good bilateral air entry, normal breath sounds (Clear to auscultation bilaterally, no audible wheezes, crackles, or rhonchi)  * Chest Wall: No tenderness or deformity  * Heart: Regular Rate and Rhythm, normal S1 and S2, no audible murmur, rub, or gallop  * Abdomen: Symmetric, non-distended, no scar, soft, non-tender, bowel sounds active all four quadrants, no masses, no organomegaly (no hepatosplenomegaly)  * Extremities: Extremities normal, atraumatic, no cyanosis, no lower extremity pitting edema bilaterally, adequate dorsalis pedis pulses  * Pulses: 2+ and symmetric all extremities  * Skin: Skin color, texture, turgor normal, no rashes or lesions  * Lymph nodes: Cervical, supraclavicular, and axillary nodes normal  * Neurologic:	CNII-XII intact, normal strength, sensation and reflexes  throughout      Investigations   Laboratory Workup  - CBC:                        16.4   20.46 )-----------( 224      ( 22 Feb 2021 08:07 )             48.8     - Chemistry:  02-22    136  |  97<L>  |  104<HH>  ----------------------------<  199<H>  4.6   |  18  |  2.3<H>    Ca    8.2<L>      22 Feb 2021 18:33  Mg     3.4     02-22    TPro  6.6  /  Alb  3.0<L>  /  TBili  0.9  /  DBili  x   /  AST  39  /  ALT  18  /  AlkPhos  144<H>  02-22    - Coagulation Studies:  PT/INR - ( 22 Feb 2021 08:07 )   PT: 14.50 sec;   INR: 1.26 ratio    PTT - ( 23 Feb 2021 01:39 )  PTT:101.6 sec        Current Medications  Standing Medications  aMIOdarone Infusion 0.501 mG/Min (16.7 mL/Hr) IV Continuous <Continuous>  aMIOdarone Infusion 0.501 mG/Min (16.7 mL/Hr) IV Continuous <Continuous>  dexAMETHasone  Injectable 6 milliGRAM(s) IV Push daily  diltiazem Infusion 10 mG/Hr (10 mL/Hr) IV Continuous <Continuous>  furosemide    Tablet 40 milliGRAM(s) Oral daily  heparin  Infusion 1100 Unit(s)/Hr (11 mL/Hr) IV Continuous <Continuous>  influenza   Vaccine 0.5 milliLiter(s) IntraMuscular once  insulin glargine Injectable (LANTUS) 30 Unit(s) SubCutaneous at bedtime  insulin lispro (ADMELOG) corrective regimen sliding scale   SubCutaneous three times a day before meals  insulin lispro (ADMELOG) corrective regimen sliding scale   SubCutaneous at bedtime  insulin lispro Injectable (ADMELOG) 12 Unit(s) SubCutaneous three times a day before meals  meropenem  IVPB 1000 milliGRAM(s) IV Intermittent every 12 hours  metoprolol tartrate Injectable 5 milliGRAM(s) IV Push every 8 hours  pantoprazole    Tablet 40 milliGRAM(s) Oral before breakfast  simvastatin 20 milliGRAM(s) Oral at bedtime  tamsulosin 0.4 milliGRAM(s) Oral at bedtime    PRN Medications  acetaminophen   Tablet .. 650 milliGRAM(s) Oral every 4 hours PRN Temp greater or equal to 38.5C (101.3F)  sodium chloride 0.65% Nasal 1 Spray(s) Both Nostrils two times a day PRN Nasal Congestion    Singles Doses Administered  (Floorstock) 1 each &lt;see task&gt; GiveOnce  (Floorstock) 1 each &lt;see task&gt; GiveOnce  (Floorstock) 1 each &lt;see task&gt; GiveOnce  aMIOdarone Infusion 0.501 mG/Min IV Continuous <Continuous>  aMIOdarone IVPB 150 milliGRAM(s) IV Intermittent once  digoxin  Injectable 0.25 milliGRAM(s) IV Push once  diltiazem    milliGRAM(s) Oral once  diltiazem Injectable 24 milliGRAM(s) IV Push once  diltiazem Injectable 10 milliGRAM(s) IV Push once  diltiazem Injectable 10 milliGRAM(s) IV Push once PRN  diltiazem Injectable 10 milliGRAM(s) IV Push once  furosemide   Injectable 40 milliGRAM(s) IV Push once  furosemide   Injectable 40 milliGRAM(s) IV Push once  insulin lispro Injectable (ADMELOG) 6 Unit(s) SubCutaneous once  insulin lispro Injectable (ADMELOG). 12 Unit(s) SubCutaneous once  metoprolol tartrate Injectable 5 milliGRAM(s) IV Push once  phytonadione  IVPB 5 milliGRAM(s) IV Intermittent once  phytonadione  IVPB 2.5 milliGRAM(s) IV Intermittent once  remdesivir  IVPB 100 milliGRAM(s) IV Intermittent every 24 hours  sodium zirconium cyclosilicate 5 Gram(s) Oral once     Progress Note  This morning patient was seen and examined at bedside.    Today is hospital day 8d.  Mr. Smiley is doing fine.   He reports he had a good night sleep. He is still on BiPAP at night EPAP 10 but will switch to HF now. His appetite is reduced as he is on BiPAP, but he denies nausea or vomiting. He denies any abdominal pain, diarrhea, or constipation. He is not ambulating and denies any chest pain, palpitations, or light headedness. He is voiding freely and denies urinary symptoms (dysuria, urgency, frequency, intermittence).      Vital Signs in the last 24 hours   Vitals Summary T(C): 36.6 (02-23-21 @ 04:00), Max: 36.6 (02-23-21 @ 04:00)  HR: 112 (02-23-21 @ 07:00) (95 - 139)  BP: 105/74 (02-23-21 @ 07:00) (97/52 - 124/76)  RR: 28 (02-23-21 @ 07:00) (20 - 30)  SpO2: 97% (02-23-21 @ 07:00) (88% - 100%)  Vent Data   Intake/ Output   02-22-21 @ 07:01  -  02-23-21 @ 07:00  --------------------------------------------------------  IN: 2103.8 mL / OUT: 1560 mL / NET: 543.8 mL          Physical Exam  * General Appearance: Alert, cooperative, interactive, well-groomed, oriented to time, place, and person, in no acute distress  * Head: Normocephalic, without obvious abnormality, atraumatic  * Lungs: Respirations unlabored, Good bilateral air entry, normal breath sounds (Clear to auscultation bilaterally, no audible wheezes, crackles, or rhonchi)  * Chest Wall: No tenderness or deformity  * Heart: Regular Rate and Rhythm, normal S1 and S2, no audible murmur, rub, or gallop  * Abdomen: Symmetric, non-distended, no scar, soft, non-tender, bowel sounds active all four quadrants, no masses, no organomegaly (no hepatosplenomegaly)  * Extremities: Extremities normal, atraumatic, no cyanosis, no lower extremity pitting edema bilaterally, adequate dorsalis pedis pulses  * Pulses: 2+ and symmetric all extremities  * Skin: Skin color, texture, turgor normal, no rashes or lesions  * Lymph nodes: Cervical, supraclavicular, and axillary nodes normal  * Neurologic:	CNII-XII intact, normal strength, sensation and reflexes  throughout      Investigations   Laboratory Workup  - CBC:                        16.4   20.46 )-----------( 224      ( 22 Feb 2021 08:07 )             48.8     - Chemistry:  02-22    136  |  97<L>  |  104<HH>  ----------------------------<  199<H>  4.6   |  18  |  2.3<H>    Ca    8.2<L>      22 Feb 2021 18:33  Mg     3.4     02-22    TPro  6.6  /  Alb  3.0<L>  /  TBili  0.9  /  DBili  x   /  AST  39  /  ALT  18  /  AlkPhos  144<H>  02-22    - Coagulation Studies:  PT/INR - ( 22 Feb 2021 08:07 )   PT: 14.50 sec;   INR: 1.26 ratio    PTT - ( 23 Feb 2021 01:39 )  PTT:101.6 sec        Current Medications  Standing Medications  aMIOdarone Infusion 0.501 mG/Min (16.7 mL/Hr) IV Continuous <Continuous>  aMIOdarone Infusion 0.501 mG/Min (16.7 mL/Hr) IV Continuous <Continuous>  dexAMETHasone  Injectable 6 milliGRAM(s) IV Push daily  diltiazem Infusion 10 mG/Hr (10 mL/Hr) IV Continuous <Continuous>  furosemide    Tablet 40 milliGRAM(s) Oral daily  heparin  Infusion 1100 Unit(s)/Hr (11 mL/Hr) IV Continuous <Continuous>  influenza   Vaccine 0.5 milliLiter(s) IntraMuscular once  insulin glargine Injectable (LANTUS) 30 Unit(s) SubCutaneous at bedtime  insulin lispro (ADMELOG) corrective regimen sliding scale   SubCutaneous three times a day before meals  insulin lispro (ADMELOG) corrective regimen sliding scale   SubCutaneous at bedtime  insulin lispro Injectable (ADMELOG) 12 Unit(s) SubCutaneous three times a day before meals  meropenem  IVPB 1000 milliGRAM(s) IV Intermittent every 12 hours  metoprolol tartrate Injectable 5 milliGRAM(s) IV Push every 8 hours  pantoprazole    Tablet 40 milliGRAM(s) Oral before breakfast  simvastatin 20 milliGRAM(s) Oral at bedtime  tamsulosin 0.4 milliGRAM(s) Oral at bedtime    PRN Medications  acetaminophen   Tablet .. 650 milliGRAM(s) Oral every 4 hours PRN Temp greater or equal to 38.5C (101.3F)  sodium chloride 0.65% Nasal 1 Spray(s) Both Nostrils two times a day PRN Nasal Congestion    Singles Doses Administered  (Floorstock) 1 each &lt;see task&gt; GiveOnce  (Floorstock) 1 each &lt;see task&gt; GiveOnce  (Floorstock) 1 each &lt;see task&gt; GiveOnce  aMIOdarone Infusion 0.501 mG/Min IV Continuous <Continuous>  aMIOdarone IVPB 150 milliGRAM(s) IV Intermittent once  digoxin  Injectable 0.25 milliGRAM(s) IV Push once  diltiazem    milliGRAM(s) Oral once  diltiazem Injectable 24 milliGRAM(s) IV Push once  diltiazem Injectable 10 milliGRAM(s) IV Push once  diltiazem Injectable 10 milliGRAM(s) IV Push once PRN  diltiazem Injectable 10 milliGRAM(s) IV Push once  furosemide   Injectable 40 milliGRAM(s) IV Push once  furosemide   Injectable 40 milliGRAM(s) IV Push once  insulin lispro Injectable (ADMELOG) 6 Unit(s) SubCutaneous once  insulin lispro Injectable (ADMELOG). 12 Unit(s) SubCutaneous once  metoprolol tartrate Injectable 5 milliGRAM(s) IV Push once  phytonadione  IVPB 5 milliGRAM(s) IV Intermittent once  phytonadione  IVPB 2.5 milliGRAM(s) IV Intermittent once  remdesivir  IVPB 100 milliGRAM(s) IV Intermittent every 24 hours  sodium zirconium cyclosilicate 5 Gram(s) Oral once

## 2021-02-23 NOTE — PROGRESS NOTE ADULT - SUBJECTIVE AND OBJECTIVE BOX
SUBJECTIVE:    Patient is a 76y old Male who presents with a chief complaint of COVID-19 pneumonia (22 Feb 2021 14:53)    Currently admitted to medicine with the primary diagnosis of COVID-19       Today is hospital day 8d.     PAST MEDICAL & SURGICAL HISTORY  Gastroesophageal reflux disease    Enlarged liver    BPH (benign prostatic hyperplasia)    Essential hypertension    Heart valve disorder    History of back surgery      ALLERGIES:  No Known Allergies    MEDICATIONS:  STANDING MEDICATIONS  aMIOdarone Infusion 0.501 mG/Min IV Continuous <Continuous>  aMIOdarone Infusion 0.501 mG/Min IV Continuous <Continuous>  dexAMETHasone  Injectable 6 milliGRAM(s) IV Push daily  diltiazem Infusion 10 mG/Hr IV Continuous <Continuous>  furosemide    Tablet 40 milliGRAM(s) Oral daily  heparin  Infusion 1100 Unit(s)/Hr IV Continuous <Continuous>  influenza   Vaccine 0.5 milliLiter(s) IntraMuscular once  insulin glargine Injectable (LANTUS) 30 Unit(s) SubCutaneous at bedtime  insulin lispro (ADMELOG) corrective regimen sliding scale   SubCutaneous three times a day before meals  insulin lispro (ADMELOG) corrective regimen sliding scale   SubCutaneous at bedtime  insulin lispro Injectable (ADMELOG) 12 Unit(s) SubCutaneous three times a day before meals  meropenem  IVPB 1000 milliGRAM(s) IV Intermittent every 12 hours  metoprolol tartrate Injectable 5 milliGRAM(s) IV Push every 6 hours  pantoprazole    Tablet 40 milliGRAM(s) Oral before breakfast  simvastatin 20 milliGRAM(s) Oral at bedtime  tamsulosin 0.4 milliGRAM(s) Oral at bedtime    PRN MEDICATIONS  acetaminophen   Tablet .. 650 milliGRAM(s) Oral every 4 hours PRN  sodium chloride 0.65% Nasal 1 Spray(s) Both Nostrils two times a day PRN    VITALS:   T(F): 97.8  HR: 128  BP: 113/78  RR: 24  SpO2: 95%    LABS:                        16.9   20.16 )-----------( 226      ( 23 Feb 2021 08:07 )             50.5     02-23    138  |  100  |  100<HH>  ----------------------------<  212<H>  4.8   |  19  |  2.3<H>    Ca    8.1<L>      23 Feb 2021 08:07  Mg     3.7     02-23    TPro  6.6  /  Alb  3.0<L>  /  TBili  0.9  /  DBili  x   /  AST  39  /  ALT  18  /  AlkPhos  144<H>  02-22    PT/INR - ( 22 Feb 2021 08:07 )   PT: 14.50 sec;   INR: 1.26 ratio         PTT - ( 23 Feb 2021 08:07 )  PTT:64.4 sec              RADIOLOGY:    PHYSICAL EXAM:  GEN: No acute distress  LUNGS: Clear to auscultation bilaterally   HEART: S1/S2 present. RRR.   ABD/ GI: Soft, non-tender, non-distended. Bowel sounds present  EXT: NC/NC/NE/2+PP/FERRER  NEURO: AAOX3

## 2021-02-23 NOTE — PROGRESS NOTE ADULT - SUBJECTIVE AND OBJECTIVE BOX
Patient is a 76y old  Male who presents with a chief complaint of COVID-19 pneumonia (22 Feb 2021 14:53)        Over Night Events:  on AVAPS this am.  off pressors. DILLON.  NO SOB at rest         ROS:     All ROS are negative except HPI         PHYSICAL EXAM    ICU Vital Signs Last 24 Hrs  T(C): 36.6 (23 Feb 2021 04:00), Max: 36.6 (23 Feb 2021 04:00)  T(F): 97.9 (23 Feb 2021 04:00), Max: 97.9 (23 Feb 2021 04:00)  HR: 125 (23 Feb 2021 05:00) (97 - 139)  BP: 119/79 (23 Feb 2021 05:00) (97/52 - 124/76)  BP(mean): 93 (23 Feb 2021 05:00) (70 - 96)  ABP: --  ABP(mean): --  RR: 29 (23 Feb 2021 05:00) (20 - 30)  SpO2: 99% (23 Feb 2021 05:00) (88% - 100%)      CONSTITUTIONAL:  Well nourished.  NAD    ENT:   Airway patent,   Mouth with normal mucosa.   No thrush    EYES:   Pupils equal,   Round and reactive to light.    CARDIAC:   Normal rate,   Regular rhythm.     edema      Vascular:  Normal systolic impulse  No Carotid bruits    RESPIRATORY:   No wheezing  Bilateral BS  Normal chest expansion  Not tachypneic,  No use of accessory muscles    GASTROINTESTINAL:  Abdomen soft,   Non-tender,   No guarding,   + BS    MUSCULOSKELETAL:   Range of motion is not limited,  No clubbing, cyanosis    NEUROLOGICAL:   Alert  No motor  deficits.    SKIN:   Skin normal color for race,   Warm and dry   No evidence of rash.    PSYCHIATRIC:   Normal mood and affect.   No apparent risk to self or others.    HEMATOLOGICAL:  No cervical  lymphadenopathy.  no inguinal lymphadenopathy      02-22-21 @ 07:01  -  02-23-21 @ 07:00  --------------------------------------------------------  IN:    Amiodarone: 367.4 mL    Diltiazem: 300 mL    Heparin: 260 mL    IV PiggyBack: 50 mL    Oral Fluid: 990 mL  Total IN: 1967.4 mL    OUT:    Voided (mL): 1260 mL  Total OUT: 1260 mL    Total NET: 707.4 mL          LABS:                            16.4   20.46 )-----------( 224      ( 22 Feb 2021 08:07 )             48.8                                               02-22    136  |  97<L>  |  104<HH>  ----------------------------<  199<H>  4.6   |  18  |  2.3<H>    Creatinine Trend  , Cr 2.3, (02-22-21 @ 18:33)  Creatinine Trend  , Cr 2.7, (02-22-21 @ 08:07)  Creatinine Trend  , Cr 2.7, (02-21-21 @ 04:30)  Creatinine Trend  BUN 84, Cr 2.0, (02-20-21 @ 20:05)  Creatinine Trend  BUN 83, Cr 1.9, (02-20-21 @ 16:51)  Creatinine Trend  BUN 77, Cr 1.9, (02-20-21 @ 12:27)  Creatinine Trend  BUN 38, Cr 1.1, (02-19-21 @ 06:05)      Ca    8.2<L>      22 Feb 2021 18:33  Mg     3.4     02-22    TPro  6.6  /  Alb  3.0<L>  /  TBili  0.9  /  DBili  x   /  AST  39  /  ALT  18  /  AlkPhos  144<H>  02-22      PT/INR - ( 22 Feb 2021 08:07 )   PT: 14.50 sec;   INR: 1.26 ratio         PTT - ( 23 Feb 2021 01:39 )  PTT:101.6 sec                                                                                     LIVER FUNCTIONS - ( 22 Feb 2021 08:07 )  Alb: 3.0 g/dL / Pro: 6.6 g/dL / ALK PHOS: 144 U/L / ALT: 18 U/L / AST: 39 U/L / GGT: x                                                                                                                                       MEDICATIONS  (STANDING):  aMIOdarone Infusion 0.501 mG/Min (16.7 mL/Hr) IV Continuous <Continuous>  aMIOdarone Infusion 0.501 mG/Min (16.7 mL/Hr) IV Continuous <Continuous>  dexAMETHasone  Injectable 6 milliGRAM(s) IV Push daily  diltiazem Infusion 10 mG/Hr (10 mL/Hr) IV Continuous <Continuous>  furosemide    Tablet 40 milliGRAM(s) Oral daily  heparin  Infusion 1100 Unit(s)/Hr (11 mL/Hr) IV Continuous <Continuous>  influenza   Vaccine 0.5 milliLiter(s) IntraMuscular once  insulin glargine Injectable (LANTUS) 30 Unit(s) SubCutaneous at bedtime  insulin lispro (ADMELOG) corrective regimen sliding scale   SubCutaneous three times a day before meals  insulin lispro (ADMELOG) corrective regimen sliding scale   SubCutaneous at bedtime  insulin lispro Injectable (ADMELOG) 12 Unit(s) SubCutaneous three times a day before meals  meropenem  IVPB 1000 milliGRAM(s) IV Intermittent every 12 hours  metoprolol tartrate Injectable 5 milliGRAM(s) IV Push every 8 hours  pantoprazole    Tablet 40 milliGRAM(s) Oral before breakfast  simvastatin 20 milliGRAM(s) Oral at bedtime  tamsulosin 0.4 milliGRAM(s) Oral at bedtime    MEDICATIONS  (PRN):  acetaminophen   Tablet .. 650 milliGRAM(s) Oral every 4 hours PRN Temp greater or equal to 38.5C (101.3F)  sodium chloride 0.65% Nasal 1 Spray(s) Both Nostrils two times a day PRN Nasal Congestion      New X-rays reviewed:                                                                                  ECHO    CXR interpreted by me:  Bilateral infiltrates

## 2021-02-23 NOTE — PROGRESS NOTE ADULT - ASSESSMENT
Assessment and Plan  Case of a 76 year old male patient with recent positive COVID test on 02/11 who presented with 1 week of SOB on 02/12, found to have COVID pneumonia, admitted on 02/12 for hypoxic RF secondary to COVID pneumonia with stay complicated by acute DVT and possible PE. Currently hemodynamically stable.      Acute Hypoxic Respiratory Failure Secondary to COVID Pneumonia  * Recent COVID positive test on 02/11  * SARS-COV2: positive 02/12  * Chest X Ray: 02/12 bilateral opacities  * Markers as below    - Infectious Disease and Pulmonary team are on board  - Monitor for fever: afebrile in last 24 hours. Tylenol PRN for fever  - Trend WBC: 02/22 20.46  - Monitor SaO2 and Oxygen Requirements: 10/31: 02/23 BiPAP/ CPAP EPAP 10 (min 14, max 22), , RR 12, FiO2 100 with S99% RR 29  - Follow up Chest X Ray on 02/23. Last Chest X Ray on 02/22 worsening bilateral opacities  - Trend Inflammatory Markers:  --> D-dimer 68 02/12 -> 2959 02/22  --> Procalcitonin 0.06 02/12 -> 0.24 (02/20) -> 0.28 02/21 -> FU 02/22 received  --> C-reactive protein 02/12 8.53 -> 02/17 5.99 -> 02/18 9.66 -> 13.5 02/21 -> FU 02/22 received   --> LDH 02/12 263 -> 442 (02/16)  --> Ferritin 02/12 982 -> 1137 (02/13) -> 1246 (02/15) -> 1231 (02/17) -> 02/22 received  --> Fibrinogen -> follow up repeat on  - Follow up blood cultures: not drawn   - COVID therapy:  --> S/P 1 unit of convalescent plasma 02/13  --> Completed a 5 day course of IV Remdesevir: S/P 100mg QD from 02/13-02/17  --> Started on IV Dexamethasone 6mg QD on 02/15 for 10 days  --> Monitor POCT (details under DM section)  - Therapeutic Anticoagulation Heparin Drip (details under Acute DVT and possible PE)      Provoked DVT  Possible PE  * Duplex Venous (02/19): left common femoral, femoral, popliteal DVT, left gastrocnemius, right small superficial thrombosis  * Home med: coumadin for afib  * D-dimer as above under COVID pneumonia  * ED 02/12 INR 1.83 s/p Vitamin K x2 doses  - Heme onc on board for supra therapeutic INR in ED and presumed PE/ DIC: will ask about eliquis on discharge  - Continue Heparin Drip: last PTT 11PM 02/22 101.6 so held for 2 hours then resumed at 11ml/hour -> FU aPTT 4 AM 02/23  - Last coumadin dose 3mg x1 02/14. Holding coumadin for now      Pre-Renal PRAVIN Secondary to reduced PO intake on NR and possibly the IV Lasix dose of 40mg on 02/20  * Possibly Secondary to reduced PO intake on NR and possibly the IV Lasix dose of 40mg on 02/20  * Baseline Cr 1.1 -> 02/20 BUN 84, Cr 2.0  * 02/21 and 02/22 Cr 2.7  - Monitor BUN and Cr  - Continuing PO Lasix 40mg QD for now  - Monitor I/O: 02/23 net +384ml, 24h +693mL (uo 1.26L)      Chronic Atrial Fibrillation  * Rate uncontrolled possibly 2ry to ARF  * Home coumadin 3mg QD, Cardizem 240mg QD  * ECG 02/13 afib with RVR  - Cardiology Dr Palacios on board: last on 02/21  - Monitor HR 02/23 106, 116, 115, 98 bpm  - Continue IV Lopressor 5mg Q8h (since 02/20), Amio drip at 16.7ml/hour (since 02/21), and Cardizem drip 10ml/hour (since 02/21). Also S/P IV Digoxin 0.25mg x1 dose on 02/22. FU digoxin level 02/23  - For AC: heparin drip as above  - Keep K>4, Mg>2      Hypertension  * Home meds Losartan 50mg BID, Cardizem 240mg QD, and Lasix 40mg QD  - Monitor BP: 02/23 108/70 (84) mmHg  - Continue Lasix PO 40mg QD  - Continue IV lopressor 5mg Q8h and Cardizem/Amio drips (02/21 start date)      DM II  * a1c 02/13 11  * Home metformin 500mg BID  - Monitor POCT 02/23 239, 237, 301, 188, 241  - Continue Lantus 30u, Lispro 12 u TID, and Scale B      GERD  * Home omperazole 20mg QD  - Protonix 40mg QD PO      DL  * No lipid profile  * Home simvastatin 20mg QD  - Continue Simvastatin 20mg QD      Others  - DVT Prophylaxis: Heparin Drip as above  - GI Prophylaxis: Pantoprazole 40mg PO QD  - Diet: Regular  - Code Status: Full      Barriers to learning: NO  Discharge Planning: Patient will be discharged once stable   Plan was communicated with medical team and RN in charge      Niecy Colmenares MD  PGY - 1 Internal Medicine   [facility]

## 2021-02-23 NOTE — PROGRESS NOTE ADULT - SUBJECTIVE AND OBJECTIVE BOX
ELIZA POWELL  76y, Male    All available historical data reviewed    OVERNIGHT EVENTS:  no fever  feels well and has no complaints  BIPAP    ROS:  General: Denies rigors, nightsweats  HEENT: Denies headache, rhinorrhea, sore throat, eye pain  CV: Denies CP, palpitations  PULM: Denies wheezing, hemoptysis  GI: Denies hematemesis, hematochezia, melena  : Denies discharge, hematuria  MSK: Denies arthralgias, myalgias  SKIN: Denies rash, lesions  NEURO: Denies paresthesias, weakness  PSYCH: Denies depression, anxiety    VITALS:  T(F): 97.8, Max: 97.9 (02-23-21 @ 04:00)  HR: 116  BP: 112/73  RR: 28Vital Signs Last 24 Hrs  T(C): 36.6 (23 Feb 2021 08:00), Max: 36.6 (23 Feb 2021 04:00)  T(F): 97.8 (23 Feb 2021 08:00), Max: 97.9 (23 Feb 2021 04:00)  HR: 116 (23 Feb 2021 08:00) (95 - 139)  BP: 112/73 (23 Feb 2021 08:00) (102/70 - 124/76)  BP(mean): 85 (23 Feb 2021 07:00) (82 - 96)  RR: 28 (23 Feb 2021 08:00) (20 - 30)  SpO2: 97% (23 Feb 2021 08:00) (88% - 100%)    TESTS & MEASUREMENTS:                        16.9   20.16 )-----------( 226      ( 23 Feb 2021 08:07 )             50.5     02-23    138  |  100  |  100<HH>  ----------------------------<  212<H>  4.8   |  19  |  2.3<H>    Ca    8.1<L>      23 Feb 2021 08:07  Mg     3.7     02-23    TPro  6.6  /  Alb  3.0<L>  /  TBili  0.9  /  DBili  x   /  AST  39  /  ALT  18  /  AlkPhos  144<H>  02-22    LIVER FUNCTIONS - ( 22 Feb 2021 08:07 )  Alb: 3.0 g/dL / Pro: 6.6 g/dL / ALK PHOS: 144 U/L / ALT: 18 U/L / AST: 39 U/L / GGT: x                   RADIOLOGY & ADDITIONAL TESTS:  Personal review of radiological diagnostics performed  Echo and EKG results noted when applicable.     MEDICATIONS:  acetaminophen   Tablet .. 650 milliGRAM(s) Oral every 4 hours PRN  aMIOdarone Infusion 0.501 mG/Min IV Continuous <Continuous>  aMIOdarone Infusion 0.501 mG/Min IV Continuous <Continuous>  dexAMETHasone  Injectable 6 milliGRAM(s) IV Push daily  diltiazem Infusion 10 mG/Hr IV Continuous <Continuous>  furosemide    Tablet 40 milliGRAM(s) Oral daily  heparin  Infusion 1100 Unit(s)/Hr IV Continuous <Continuous>  influenza   Vaccine 0.5 milliLiter(s) IntraMuscular once  insulin glargine Injectable (LANTUS) 30 Unit(s) SubCutaneous at bedtime  insulin lispro (ADMELOG) corrective regimen sliding scale   SubCutaneous three times a day before meals  insulin lispro (ADMELOG) corrective regimen sliding scale   SubCutaneous at bedtime  insulin lispro Injectable (ADMELOG) 12 Unit(s) SubCutaneous three times a day before meals  meropenem  IVPB 1000 milliGRAM(s) IV Intermittent every 12 hours  metoprolol tartrate Injectable 5 milliGRAM(s) IV Push every 6 hours  pantoprazole    Tablet 40 milliGRAM(s) Oral before breakfast  simvastatin 20 milliGRAM(s) Oral at bedtime  sodium chloride 0.65% Nasal 1 Spray(s) Both Nostrils two times a day PRN  tamsulosin 0.4 milliGRAM(s) Oral at bedtime      ANTIBIOTICS:  meropenem  IVPB 1000 milliGRAM(s) IV Intermittent every 12 hours

## 2021-02-23 NOTE — PROGRESS NOTE ADULT - ASSESSMENT
76 year old Male with a past medical history of HTN, HLD, BPH, Hepatomegaly, "Heart valve disorder" ?Afib on Coumadin, presented to the ER with a chief complaint of Weakness and shortness of breath x 1 week, covid + x 1 day. Patient is being admitted for increased weakness secondary to COVID infection     IMPRESSION;  COVID 19 with severe illness. SpO2 < 94% on RA and need for supplemental O2. BIPAP  Pt was  in the early viral replicative phase based on the timeline/onset of symptoms.   COVID-19 antibodies NG  procalcitonin 0.08  , not suggestive of a bacterial PNA.  Ferritin 1246>1231>1231  CRP 7.70 >5.9>13.5  Ddimers 197>2959 ( suggestive of a pulmonary embolus )  Legionella NG    s/p RDV   s/p plasma    RECOMMENDATIONS;  CT chest angio  Target SpO2 92 % to 96 %  Dexamethasone 6 mg iv q24h for 10 days. since 2/13  Monitor for side effects: hyperglycemia, neurological ( agitation/confusion), adrenal suppression, bacterial and fungal infections  Anticoagulation as per team

## 2021-02-23 NOTE — PROGRESS NOTE ADULT - ASSESSMENT
IMPRESSION:  Acute hypoxemic respiratory failure  COVID-19 PNA  HO SMILEY, noncompliant with CPAP  HO Afib, on coumadin at home  DVT ?? PE   PRAVIN       PLAN:    CNS: Avoid CNS depressants.     HEENT: Oral care    PULMONARY:  HOB @ 45 degrees.  Aspiration precautions. trial of HF.  Alternate wiht NIV.  NIV during sleep.  Rebecca BALLARD#[id number].       CARDIOVASCULAR: Avoid volume overload. Repeat BNP. Rate control.      GI: GI prophylaxis.  Feeding as tolerated when off AVAPS.  Bowel regimen     RENAL:  Follow up lytes.  Correct as needed.     INFECTIOUS: DISEASE: FU with ID.       HEMATOLOGICAL:  DVT therapy.  Heparin drip.  Monitor CBC and PTT     ENDOCRINE:  Follow up FS.  Insulin protocol if needed.    MUSCULOSKELETAL: bedrest    Overall poor prognosis    [facility]

## 2021-02-24 NOTE — PROGRESS NOTE ADULT - SUBJECTIVE AND OBJECTIVE BOX
Progress Note  This morning patient was seen and examined at bedside.    Today is hospital day 9d.  Mr. Smiley is doing fine.   He reports he had a good night sleep. He was on AVAPs at night EPAP 10. We will alternate between HF 60L 100% for 2 hours and NIV for 4 hours during day from now on. His appetite is reduced as he was on BiPAP, but he denies nausea or vomiting. He denies any abdominal pain, diarrhea, or constipation. He had a BM on 02/24. He is not ambulating and denies any chest pain, palpitations, or light headedness. He is voiding freely and denies urinary symptoms (dysuria, urgency, frequency, intermittence).      Vital Signs in the last 24 hours   Vitals Summary T(C): 37 (02-24-21 @ 07:45), Max: 37.1 (02-23-21 @ 12:30)  HR: 78 (02-24-21 @ 10:00) (78 - 124)  BP: 129/69 (02-24-21 @ 10:00) (98/66 - 129/69)  RR: 26 (02-24-21 @ 10:00) (20 - 30)  SpO2: 90% (02-24-21 @ 10:00) (90% - 99%)  Vent Data   Intake/ Output   02-23-21 @ 07:01  -  02-24-21 @ 07:00  --------------------------------------------------------  IN: 1002.1 mL / OUT: 2100 mL / NET: -1097.9 mL    02-24-21 @ 07:01  -  02-24-21 @ 11:28  --------------------------------------------------------  IN: 160.8 mL / OUT: 0 mL / NET: 160.8 mL      Physical Exam  * General Appearance: Alert, cooperative, interactive, well-groomed, oriented to time, place, and person, in no acute distress  * Head: Normocephalic, without obvious abnormality, atraumatic  * Lungs: Respirations unlabored, Good bilateral air entry, normal breath sounds (Clear to auscultation bilaterally, no audible wheezes, crackles, or rhonchi)  * Chest Wall: No tenderness or deformity  * Heart: Regular Rate and Rhythm, normal S1 and S2, no audible murmur, rub, or gallop  * Abdomen: Symmetric, non-distended, no scar, soft, non-tender, bowel sounds active all four quadrants, no masses, no organomegaly (no hepatosplenomegaly)  * Extremities: Extremities normal, atraumatic, no cyanosis, no lower extremity pitting edema bilaterally, adequate dorsalis pedis pulses  * Pulses: 2+ and symmetric all extremities  * Skin: Skin color, texture, turgor normal, no rashes or lesions  * Lymph nodes: Cervical, supraclavicular, and axillary nodes normal  * Neurologic: CNII-XII intact, normal strength, sensation and reflexes  throughout      Investigations   Laboratory Workup  - CBC:                        16.4   20.72 )-----------( 262      ( 24 Feb 2021 08:12 )             48.7     - Chemistry:  02-24    137  |  100  |  111<HH>  ----------------------------<  246<H>  4.9   |  20  |  2.2<H>    Ca    8.1<L>      24 Feb 2021 08:12  Mg     3.8     02-24    TPro  6.4  /  Alb  2.8<L>  /  TBili  0.8  /  DBili  x   /  AST  31  /  ALT  19  /  AlkPhos  129<H>  02-24    - Coagulation Studies:  PTT - ( 24 Feb 2021 08:12 )  PTT:61.7 sec      Current Medications  Standing Medications  aMIOdarone Infusion 0.501 mG/Min (16.7 mL/Hr) IV Continuous <Continuous>  aMIOdarone Infusion 0.501 mG/Min (16.7 mL/Hr) IV Continuous <Continuous>  dexAMETHasone  Injectable 6 milliGRAM(s) IV Push daily  diltiazem Infusion 10 mG/Hr (10 mL/Hr) IV Continuous <Continuous>  heparin  Infusion 1100 Unit(s)/Hr (11 mL/Hr) IV Continuous <Continuous>  influenza   Vaccine 0.5 milliLiter(s) IntraMuscular once  insulin glargine Injectable (LANTUS) 30 Unit(s) SubCutaneous at bedtime  insulin lispro (ADMELOG) corrective regimen sliding scale   SubCutaneous three times a day before meals  insulin lispro (ADMELOG) corrective regimen sliding scale   SubCutaneous at bedtime  insulin lispro Injectable (ADMELOG) 12 Unit(s) SubCutaneous three times a day before meals  lactated ringers. 1000 milliLiter(s) (50 mL/Hr) IV Continuous <Continuous>  meropenem  IVPB 1000 milliGRAM(s) IV Intermittent every 12 hours  metoprolol tartrate Injectable 5 milliGRAM(s) IV Push every 6 hours  pantoprazole    Tablet 40 milliGRAM(s) Oral before breakfast  simvastatin 20 milliGRAM(s) Oral at bedtime  tamsulosin 0.4 milliGRAM(s) Oral at bedtime    PRN Medications  acetaminophen   Tablet .. 650 milliGRAM(s) Oral every 4 hours PRN Temp greater or equal to 38.5C (101.3F)  sodium chloride 0.65% Nasal 1 Spray(s) Both Nostrils two times a day PRN Nasal Congestion    Singles Doses Administered  (Floorstock) 1 each &lt;see task&gt; GiveOnce  (Floorstock) 1 each &lt;see task&gt; GiveOnce  (Floorstock) 1 each &lt;see task&gt; GiveOnce  aMIOdarone Infusion 0.501 mG/Min IV Continuous <Continuous>  aMIOdarone IVPB 150 milliGRAM(s) IV Intermittent once  digoxin  Injectable 0.25 milliGRAM(s) IV Push once  diltiazem    milliGRAM(s) Oral once  diltiazem Injectable 24 milliGRAM(s) IV Push once  diltiazem Injectable 10 milliGRAM(s) IV Push once PRN  diltiazem Injectable 10 milliGRAM(s) IV Push once  diltiazem Injectable 10 milliGRAM(s) IV Push once  furosemide   Injectable 40 milliGRAM(s) IV Push once  furosemide   Injectable 40 milliGRAM(s) IV Push once  insulin lispro Injectable (ADMELOG) 6 Unit(s) SubCutaneous once  insulin lispro Injectable (ADMELOG). 12 Unit(s) SubCutaneous once  metoprolol tartrate Injectable 5 milliGRAM(s) IV Push once  phytonadione  IVPB 5 milliGRAM(s) IV Intermittent once  phytonadione  IVPB 2.5 milliGRAM(s) IV Intermittent once  remdesivir  IVPB 100 milliGRAM(s) IV Intermittent every 24 hours  sodium zirconium cyclosilicate 5 Gram(s) Oral once

## 2021-02-24 NOTE — PROGRESS NOTE ADULT - SUBJECTIVE AND OBJECTIVE BOX
SUBJ:  Chart reviewed, spoke to house staff    MEDICATIONS  (STANDING):  aMIOdarone Infusion 0.501 mG/Min (16.7 mL/Hr) IV Continuous <Continuous>  aMIOdarone Infusion 0.501 mG/Min (16.7 mL/Hr) IV Continuous <Continuous>  dexAMETHasone  Injectable 6 milliGRAM(s) IV Push daily  diltiazem Infusion 10 mG/Hr (10 mL/Hr) IV Continuous <Continuous>  heparin  Infusion 1100 Unit(s)/Hr (11 mL/Hr) IV Continuous <Continuous>  influenza   Vaccine 0.5 milliLiter(s) IntraMuscular once  insulin glargine Injectable (LANTUS) 30 Unit(s) SubCutaneous at bedtime  insulin lispro (ADMELOG) corrective regimen sliding scale   SubCutaneous three times a day before meals  insulin lispro (ADMELOG) corrective regimen sliding scale   SubCutaneous at bedtime  insulin lispro Injectable (ADMELOG) 12 Unit(s) SubCutaneous three times a day before meals  lactated ringers. 1000 milliLiter(s) (50 mL/Hr) IV Continuous <Continuous>  meropenem  IVPB 1000 milliGRAM(s) IV Intermittent every 12 hours  metoprolol tartrate Injectable 5 milliGRAM(s) IV Push every 6 hours  pantoprazole    Tablet 40 milliGRAM(s) Oral before breakfast  simvastatin 20 milliGRAM(s) Oral at bedtime  tamsulosin 0.4 milliGRAM(s) Oral at bedtime    MEDICATIONS  (PRN):  acetaminophen   Tablet .. 650 milliGRAM(s) Oral every 4 hours PRN Temp greater or equal to 38.5C (101.3F)  sodium chloride 0.65% Nasal 1 Spray(s) Both Nostrils two times a day PRN Nasal Congestion            Vital Signs Last 24 Hrs  T(C): 37.2 (24 Feb 2021 19:45), Max: 37.2 (24 Feb 2021 19:45)  T(F): 98.9 (24 Feb 2021 19:45), Max: 98.9 (24 Feb 2021 19:45)  HR: 74 (24 Feb 2021 19:45) (70 - 124)  BP: 127/75 (24 Feb 2021 19:45) (99/71 - 131/74)  BP(mean): 98 (24 Feb 2021 17:00) (80 - 102)  RR: 20 (24 Feb 2021 19:45) (20 - 30)  SpO2: 98% (24 Feb 2021 19:45) (90% - 99%)       TELEMETRY:    ECG:    TTE:    LABS:                        16.4   20.72 )-----------( 262      ( 24 Feb 2021 08:12 )             48.7     02-24    137  |  100  |  111<HH>  ----------------------------<  246<H>  4.9   |  20  |  2.2<H>    Ca    8.1<L>      24 Feb 2021 08:12  Mg     3.8     02-24    TPro  6.4  /  Alb  2.8<L>  /  TBili  0.8  /  DBili  x   /  AST  31  /  ALT  19  /  AlkPhos  129<H>  02-24        PTT - ( 24 Feb 2021 08:12 )  PTT:61.7 sec    I&O's Summary    23 Feb 2021 07:01  -  24 Feb 2021 07:00  --------------------------------------------------------  IN: 1002.1 mL / OUT: 2100 mL / NET: -1097.9 mL    24 Feb 2021 07:01  -  24 Feb 2021 21:18  --------------------------------------------------------  IN: 882.4 mL / OUT: 0 mL / NET: 882.4 mL      BNP  RADIOLOGY & ADDITIONAL STUDIES:    IMPRESSION AND PLAN:

## 2021-02-24 NOTE — PROGRESS NOTE ADULT - ASSESSMENT
77 yo M with PMx Chronic Afib on Coumadin, HTN, DLD, DM II,  with recent positive COVID test on 02/11 who presented with 1 week of SOB on 02/12, transferred to [facility] due to acute hypoxic respiratory failure due to COVID PNA, complicated by DVT and possible PE    Acute Hypoxemic Respiratory Failure Secondary to COVID Pneumonia  Provoked DVT/Possible PE  currently on AVAPs, saturating 99%, alternate with HFNC if possible   s/p RDV 2/13-2/17, s/p 1u convalescent plasma 2/13  on dexamethasone 6mg daily for 10 days, started 2/13.... can dc it today   Chest xray with possible pneumomediastinum, pending repeat Xray  ID on board  taper o2 as tolerated  trend inflammatory markers   LE duplex (02/19): left common femoral, femoral, popliteal DVT, left gastrocnemius, right small superficial thrombosis  continue with Heparin Gtt for  now, monitor PTTs    PRAVNI, likely prerenal  Scr 2.2 today, baseline 1.1-1.3  Lasix 40 on hold for now  start gentle hydration LR 50cc hr and monitor daily BMP  monitor I&O    Chronic Atrial Fibrillation - uncontrolled  Hypertension  HR fluctuates 80s to 110s  currently on Cardizem gtt 15, Amiodarone gtt, Lopressor 5mg IV Q6H and s/p Digoxin 0.25 x2  f/u digoxin level  monitor Electrolytes and replete PRN  f/u cardio Dr Carl    DM II  Hba1c 11  continune with Lantus 30 and Lispro 12 AC with ISS  monitor FS    Patient requires continuous monitoring in [facility]

## 2021-02-24 NOTE — PROGRESS NOTE ADULT - ASSESSMENT
· Assessment	  76 year old Male with a past medical history of HTN, HLD, BPH, Hepatomegaly, "Heart valve disorder" ?Afib on Coumadin, presented to the ER with a chief complaint of Weakness and shortness of breath x 1 week, covid + x 1 day. Patient is being admitted for increased weakness secondary to COVID infection     IMPRESSION;  COVID 19 with severe illness. SpO2 < 94% on RA and need for supplemental O2. BIPAP  Pt was  in the early viral replicative phase based on the timeline/onset of symptoms.   COVID-19 antibodies NG  procalcitonin 0.08  , not suggestive of a bacterial PNA.  Ferritin 1246>1231>1231  CRP 7.70 >5.9>13.5  Ddimers 197>2959 ( suggestive of a pulmonary embolus ) > 3921  Legionella NG  Worsening renal function    s/p RDV   s/p plasma    RECOMMENDATIONS;  CT chest angio  Target SpO2 92 % to 96 %  D/c Dexamethasone 6 mg iv q24h for 10 days. since 2/13  Monitor for side effects: hyperglycemia, neurological ( agitation/confusion), adrenal suppression, bacterial and fungal infections  Anticoagulation as per team

## 2021-02-24 NOTE — PROGRESS NOTE ADULT - SUBJECTIVE AND OBJECTIVE BOX
ELIZA POWELL  76y Male    CHIEF COMPLAINT:    Patient is a 76y old  Male who presents with a chief complaint of COVID-19 pneumonia (22 Feb 2021 14:53)      INTERVAL HPI/OVERNIGHT EVENTS:    Patient seen and examined. No acute events overnight.     ROS: All other systems are negative.    Vital Signs:    T(F): 98.6 (02-24-21 @ 07:45), Max: 98.6 (02-24-21 @ 04:00)  HR: 78 (02-24-21 @ 14:00) (78 - 124)  BP: 119/77 (02-24-21 @ 14:00) (99/71 - 129/69)  RR: 20 (02-24-21 @ 14:00) (20 - 30)  SpO2: 99% (02-24-21 @ 14:00) (90% - 99%)    23 Feb 2021 07:01  -  24 Feb 2021 07:00  --------------------------------------------------------  IN: 1002.1 mL / OUT: 2100 mL / NET: -1097.9 mL    24 Feb 2021 07:01  -  24 Feb 2021 15:23  --------------------------------------------------------  IN: 301.6 mL / OUT: 0 mL / NET: 301.6 mL    POCT Blood Glucose.: 267 mg/dL (24 Feb 2021 11:53)  POCT Blood Glucose.: 209 mg/dL (24 Feb 2021 05:58)  POCT Blood Glucose.: 201 mg/dL (23 Feb 2021 21:27)  POCT Blood Glucose.: 204 mg/dL (23 Feb 2021 16:41)      PHYSICAL EXAM:    GENERAL:  NAD  SKIN: No rashes or lesions  HEENT: Atraumatic. Normocephalic.   NECK: Supple, No JVD.   PULMONARY: CTA B/L. No wheezing. No rales  CVS: Normal S1, S2. Rate and Rhythm are regular.  ABDOMEN/GI: Soft, Nontender, Nondistended; BS present  MSK: No clubbing or cyanosis  NEUROLOGIC: moves all extremities  PSYCH: Alert & oriented x 3, normal affect    Consultant(s) Notes Reviewed:  [x ] YES  [ ] NO  Care Discussed with Consultants/Other Providers [ x] YES  [ ] NO    LABS:                        16.4   20.72 )-----------( 262      ( 24 Feb 2021 08:12 )             48.7     137  |  100  |  111<HH>  ----------------------------<  246<H>  4.9   |  20  |  2.2<H>    Ca    8.1<L>      24 Feb 2021 08:12  Mg     3.8     02-24    TPro  6.4  /  Alb  2.8<L>  /  TBili  0.8  /  DBili  x   /  AST  31  /  ALT  19  /  AlkPhos  129<H>  02-24    PTT - ( 24 Feb 2021 08:12 )  PTT:61.7 sec  Serum Pro-Brain Natriuretic Peptide: 1086 pg/mL (02-20-21 @ 12:27)    RADIOLOGY & ADDITIONAL TESTS:  < from: Xray Chest 1 View- PORTABLE-Routine (Xray Chest 1 View- PORTABLE-Routine in AM.) (02.24.21 @ 06:52) >  IMPRESSION:    Lucency surrounding the aortic knob which may represent pneumomediastinum.    Unchanged bilateral opacities.    < end of copied text >    Imaging or report Personally Reviewed:  [x] YES  [ ] NO  EKG reviewed: [x] YES  [ ] NO    Medications:  Standing  aMIOdarone Infusion 0.501 mG/Min IV Continuous <Continuous>  aMIOdarone Infusion 0.501 mG/Min IV Continuous <Continuous>  dexAMETHasone  Injectable 6 milliGRAM(s) IV Push daily  diltiazem Infusion 10 mG/Hr IV Continuous <Continuous>  heparin  Infusion 1100 Unit(s)/Hr IV Continuous <Continuous>  influenza   Vaccine 0.5 milliLiter(s) IntraMuscular once  insulin glargine Injectable (LANTUS) 30 Unit(s) SubCutaneous at bedtime  insulin lispro (ADMELOG) corrective regimen sliding scale   SubCutaneous three times a day before meals  insulin lispro (ADMELOG) corrective regimen sliding scale   SubCutaneous at bedtime  insulin lispro Injectable (ADMELOG) 12 Unit(s) SubCutaneous three times a day before meals  lactated ringers. 1000 milliLiter(s) IV Continuous <Continuous>  meropenem  IVPB 1000 milliGRAM(s) IV Intermittent every 12 hours  metoprolol tartrate Injectable 5 milliGRAM(s) IV Push every 6 hours  pantoprazole    Tablet 40 milliGRAM(s) Oral before breakfast  simvastatin 20 milliGRAM(s) Oral at bedtime  tamsulosin 0.4 milliGRAM(s) Oral at bedtime    PRN Meds  acetaminophen   Tablet .. 650 milliGRAM(s) Oral every 4 hours PRN  sodium chloride 0.65% Nasal 1 Spray(s) Both Nostrils two times a day PRN

## 2021-02-24 NOTE — PROGRESS NOTE ADULT - ASSESSMENT
Assessment and Plan  Case of a 76 year old male patient with recent positive COVID test on 02/11 who presented with 1 week of SOB on 02/12, found to have COVID pneumonia, admitted on 02/12 for hypoxic RF secondary to COVID pneumonia with stay complicated by acute DVT and possible PE. Currently hemodynamically stable.      Acute Hypoxic Respiratory Failure Secondary to COVID Pneumonia  * Recent COVID positive test on 02/11  * SARS-COV2: positive 02/12  * Chest X Ray: 02/12 bilateral opacities  * Markers as below    - Infectious Disease and Pulmonary team are on board  - Monitor for fever: afebrile in last 24 hours. Tylenol PRN for fever  - Trend WBC: 02/22 20.46 -> 02/23 20.16 -> 02/24 20.72  - Monitor SaO2 and Oxygen Requirements: 02/24 was on AVAP overnight EPAP 10, TV 380mL with S99% then switched to HF at 60L with FiO2 100%. Will alternate between HF for 2 hours then  NIV for 4h   - Follow up Chest X Ray on 02/24. Last Chest X Ray on 02/23 reduced bilateral opacities  - Trend Inflammatory Markers:  --> D-dimer 68 02/12 -> 2959 02/22 -> 3921 02/24  --> Procalcitonin 0.06 02/12 -> 0.24 (02/20) -> 0.28 02/21 -> 02/22 00.18 -> 02/24 received  --> C-reactive protein 02/12 8.53 -> 02/17 5.99 -> 02/18 9.66 -> 13.5 02/21 -> 02/22 6.78 -> 02/24 received  --> LDH 02/12 263 -> 442 (02/16)  --> Ferritin 02/12 982 -> 1137 (02/13) -> 1246 (02/15) -> 1231 (02/17) -> 02/22 2398 -> 02/25  - Follow up blood cultures: ordered for    - COVID therapy:  --> S/P 1 unit of convalescent plasma 02/13  --> Completed a 5 day course of IV Remdesevir: S/P 100mg QD from 02/13-02/17  --> Started on IV Dexamethasone 6mg QD on 02/15 for 10 days  --> Monitor POCT (details under DM section)  - Therapeutic Anticoagulation Heparin Drip (details under Acute DVT and possible PE)      Provoked DVT  Possible PE  * Duplex Venous (02/19): left common femoral, femoral, popliteal DVT, left gastrocnemius, right small superficial thrombosis  * Home med: coumadin for afib  * D-dimer as above under COVID pneumonia  * ED 02/12 INR 1.83 s/p Vitamin K x2 doses  - Heme onc on board for supra therapeutic INR in ED and presumed PE/ DIC: will ask about eliquis on discharge  - Continue Heparin Drip: last PTT 11PM 02/22 101.6 so held for 2 hours then resumed at 11ml/hour -> FU aPTT 4 AM 02/23  - Last coumadin dose 3mg x1 02/14. Holding coumadin for now      Pre-Renal PRAVIN Secondary to reduced PO intake on NR and possibly the IV Lasix dose of 40mg on 02/20  * Possibly Secondary to reduced PO intake on NR and possibly the IV Lasix dose of 40mg on 02/20  * Baseline Cr 1.1 -> 02/20 BUN 84, Cr 2.0  * 02/21 and 02/22 Cr 2.7  - Monitor BUN and Cr  - Continuing PO Lasix 40mg QD for now  - Monitor I/O: 02/23 net +384ml, 24h +693mL (uo 1.26L)      Chronic Atrial Fibrillation  * Rate uncontrolled possibly 2ry to ARF  * Home coumadin 3mg QD, Cardizem 240mg QD  * ECG 02/13 afib with RVR  - Cardiology Dr Palacios on board: last on 02/21  - Monitor HR 02/23 106, 116, 115, 98 bpm  - Continue IV Lopressor 5mg Q8h (since 02/20), Amio drip at 16.7ml/hour (since 02/21), and Cardizem drip 10ml/hour (since 02/21). Also S/P IV Digoxin 0.25mg x1 dose on 02/22. FU digoxin level 02/23  - For AC: heparin drip as above  - Keep K>4, Mg>2      Hypertension  * Home meds Losartan 50mg BID, Cardizem 240mg QD, and Lasix 40mg QD  - Monitor BP: 02/23 108/70 (84) mmHg  - Continue Lasix PO 40mg QD  - Continue IV lopressor 5mg Q8h and Cardizem/Amio drips (02/21 start date)      DM II  * a1c 02/13 11  * Home metformin 500mg BID  - Monitor POCT 02/23 239, 237, 301, 188, 241  - Continue Lantus 30u, Lispro 12 u TID, and Scale B      GERD  * Home omperazole 20mg QD  - Protonix 40mg QD PO      DL  * No lipid profile  * Home simvastatin 20mg QD  - Continue Simvastatin 20mg QD      Others  - DVT Prophylaxis: Heparin Drip as above  - GI Prophylaxis: Pantoprazole 40mg PO QD  - Diet: Regular  - Code Status: Full      Barriers to learning: NO  Discharge Planning: Patient will be discharged once stable   Plan was communicated with medical team and RN in charge      Niecy Colmenares MD  PGY - 1 Internal Medicine   [facility]     Assessment and Plan  Case of a 76 year old male patient with recent positive COVID test on 02/11 who presented with 1 week of SOB on 02/12, found to have COVID pneumonia, admitted on 02/12 for hypoxic RF secondary to COVID pneumonia with stay complicated by acute DVT and possible PE. Currently hemodynamically stable.      Acute Hypoxic Respiratory Failure Secondary to COVID Pneumonia  * Recent COVID positive test on 02/11  * SARS-COV2: positive 02/12  * Chest X Ray: 02/12 bilateral opacities  * Markers as below    - Infectious Disease and Pulmonary team are on board  - Monitor for fever: afebrile in last 24 hours. Tylenol PRN for fever  - Trend WBC: 02/22 20.46 -> 02/23 20.16 -> 02/24 20.72  - Monitor SaO2 and Oxygen Requirements: 02/24 was on AVAP overnight EPAP 10, TV 380mL with S99% then switched to HF at 60L with FiO2 100%. Will alternate between HF for 2 hours then  NIV for 4h   - Follow up Chest X Ray on 02/24. Last Chest X Ray on 02/23 reduced bilateral opacities  - Trend Inflammatory Markers:  --> D-dimer 68 02/12 -> 2959 02/22 -> 3921 02/24  --> Procalcitonin 0.06 02/12 -> 0.24 (02/20) -> 0.28 02/21 -> 02/22 00.18 -> 02/24 received  --> C-reactive protein 02/12 8.53 -> 02/17 5.99 -> 02/18 9.66 -> 13.5 02/21 -> 02/22 6.78 -> 02/24 received  --> LDH 02/12 263 -> 442 (02/16)  --> Ferritin 02/12 982 -> 1137 (02/13) -> 1246 (02/15) -> 1231 (02/17) -> 02/22 2398 -> 02/25  - Follow up blood cultures: ordered for 11:00 AM 02/24  - COVID therapy:  --> S/P 1 unit of convalescent plasma 02/13  --> Completed a 5 day course of IV Remdesevir: S/P 100mg QD from 02/13-02/17  --> Started on IV Dexamethasone 6mg QD on 02/15 for 10 days  --> Monitor POCT (details under DM section)  - Therapeutic Anticoagulation Heparin Drip (details under Acute DVT and possible PE)      Provoked DVT  Possible PE  * Duplex Venous (02/19): left common femoral, femoral, popliteal DVT, left gastrocnemius, right small superficial thrombosis  * Home med: coumadin for afib  * D-dimer as above under COVID pneumonia  * ED 02/12 INR 1.83 s/p Vitamin K x2 doses  - Heme onc on board for supra therapeutic INR in ED and presumed PE/ DIC: does not recommend eliquis on discharge in setting of valvular afib  - Continue Heparin Drip: last PTT 04:30 AM 02/24 61.7 so continued at 11ml/hour -> Will only order aPTT QD from now on   - Last coumadin dose 3mg x1 02/14. Holding coumadin for now      Pre-Renal PRAVIN Secondary to reduced PO intake on NR and possibly the IV Lasix dose of 40mg on 02/20  * Possibly Secondary to reduced PO intake on NR and possibly the IV Lasix dose of 40mg on 02/20  * Baseline Cr 1.1 -> 02/20 BUN 84, Cr 2.0  * 02/21 and 02/22 Cr 2.7  - Monitor BUN and Cr  - Will hold PO Lasix 40mg QD for now 02/24 and start gentle IV fluid hydration with LR at 50mL/hour on 02/24  - Monitor I/O: 02/23 net -823ml, 24h -1.06L (uo 1.7L)      Chronic Atrial Fibrillation  * Rate uncontrolled possibly 2ry to ARF  * Home coumadin 3mg QD, Cardizem 240mg QD  * ECG 02/13 afib with RVR  - Cardiology Dr Palacios on board: last on 02/21  - Monitor HR 02/23 max HR 124bpm in PM -> 02/24 88, 118, 101 bpm  - Continue IV Lopressor 5mg Q6h (Increased from Q8h on 02/23), Amio drip at 16.7ml/hour (since 02/21), and Cardizem drip 15ml/hour (since 02/21, increased on 02/24 from 10 to 15). Also S/P IV Digoxin 0.25mg x2 doses on 02/22. FU digoxin level 02/23: 0.3  - For AC: heparin drip as above  - Keep K>4, Mg>2      Hypertension  * Home meds Losartan 50mg BID, Cardizem 240mg QD, and Lasix 40mg QD  - Monitor BP: 02/23 108/70 (84) mmHg  - Holding Lasix PO 40mg QD starting 02/24 for pre-renal PRAVIN  - Continue IV lopressor 5mg Q8h and Cardizem/Amio drips (02/21 start date)      DM II  * a1c 02/13 11  * Home metformin 500mg BID  - Monitor POCT 02/24 215, 194, 204, 201  - Continue Lantus 30u, Lispro 12 u TID, and Scale B      GERD  * Home Omperazole 20mg QD  - Protonix 40mg QD PO      DL  * No lipid profile  * Home simvastatin 20mg QD  - Continue Simvastatin 20mg QD      Others  - DVT Prophylaxis: Heparin Drip as above  - GI Prophylaxis: Pantoprazole 40mg PO QD  - Diet: Regular  - Code Status: Full      Barriers to learning: NO  Discharge Planning: Patient will be discharged once stable   Plan was communicated with medical team and RN in charge      Niecy Colmenares MD  PGY - 1 Internal Medicine   [facility]

## 2021-02-24 NOTE — PROGRESS NOTE ADULT - ASSESSMENT
1) Persistent atrial fibrillation  presently rate is not controlled due to hypoxic respiratory failure   Consider changing  Amiodarone to 200 mg po q12h amiodarone.  Start Metoprolol 50 mg po q12h  Continue cardizem drip   Continue heparin drip.  We can start coumadin 5mg for 2 days and monitor INR and adjust the dosage.    2)DVT and possible PE  Continue heparin drip.  Start coumadin   We can add ASA 81 mg po q24h since he developed DVT while on therapeutic INR.    3)Hypoxic respiratory failure COVID - 19 pneumonia on high flow o2  F/U Pulmonary and critical care recommendation.

## 2021-02-24 NOTE — PROGRESS NOTE ADULT - SUBJECTIVE AND OBJECTIVE BOX
ELIZA POWELL  76y, Male    All available historical data reviewed    OVERNIGHT EVENTS:  no fevers  daron  feels well and has no complaints     ROS:  General: Denies rigors, nightsweats  HEENT: Denies headache, rhinorrhea, sore throat, eye pain  CV: Denies CP, palpitations  PULM: Denies wheezing, hemoptysis  GI: Denies hematemesis, hematochezia, melena  : Denies discharge, hematuria  MSK: Denies arthralgias, myalgias  SKIN: Denies rash, lesions  NEURO: Denies paresthesias, weakness  PSYCH: Denies depression, anxiety    VITALS:  T(F): 98.6, Max: 98.6 (02-24-21 @ 04:00)  HR: 78  BP: 129/69  RR: 26Vital Signs Last 24 Hrs  T(C): 37 (24 Feb 2021 07:45), Max: 37 (24 Feb 2021 04:00)  T(F): 98.6 (24 Feb 2021 07:45), Max: 98.6 (24 Feb 2021 04:00)  HR: 78 (24 Feb 2021 10:00) (78 - 124)  BP: 129/69 (24 Feb 2021 10:00) (99/71 - 129/69)  BP(mean): 95 (24 Feb 2021 09:00) (80 - 98)  RR: 26 (24 Feb 2021 10:00) (20 - 30)  SpO2: 90% (24 Feb 2021 10:00) (90% - 99%)    TESTS & MEASUREMENTS:                        16.4   20.72 )-----------( 262      ( 24 Feb 2021 08:12 )             48.7     02-24    137  |  100  |  111<HH>  ----------------------------<  246<H>  4.9   |  20  |  2.2<H>    Ca    8.1<L>      24 Feb 2021 08:12  Mg     3.8     02-24    TPro  6.4  /  Alb  2.8<L>  /  TBili  0.8  /  DBili  x   /  AST  31  /  ALT  19  /  AlkPhos  129<H>  02-24    LIVER FUNCTIONS - ( 24 Feb 2021 08:12 )  Alb: 2.8 g/dL / Pro: 6.4 g/dL / ALK PHOS: 129 U/L / ALT: 19 U/L / AST: 31 U/L / GGT: x                   RADIOLOGY & ADDITIONAL TESTS:  Personal review of radiological diagnostics performed  Echo and EKG results noted when applicable.     MEDICATIONS:  acetaminophen   Tablet .. 650 milliGRAM(s) Oral every 4 hours PRN  aMIOdarone Infusion 0.501 mG/Min IV Continuous <Continuous>  aMIOdarone Infusion 0.501 mG/Min IV Continuous <Continuous>  dexAMETHasone  Injectable 6 milliGRAM(s) IV Push daily  diltiazem Infusion 10 mG/Hr IV Continuous <Continuous>  heparin  Infusion 1100 Unit(s)/Hr IV Continuous <Continuous>  influenza   Vaccine 0.5 milliLiter(s) IntraMuscular once  insulin glargine Injectable (LANTUS) 30 Unit(s) SubCutaneous at bedtime  insulin lispro (ADMELOG) corrective regimen sliding scale   SubCutaneous three times a day before meals  insulin lispro (ADMELOG) corrective regimen sliding scale   SubCutaneous at bedtime  insulin lispro Injectable (ADMELOG) 12 Unit(s) SubCutaneous three times a day before meals  lactated ringers. 1000 milliLiter(s) IV Continuous <Continuous>  meropenem  IVPB 1000 milliGRAM(s) IV Intermittent every 12 hours  metoprolol tartrate Injectable 5 milliGRAM(s) IV Push every 6 hours  pantoprazole    Tablet 40 milliGRAM(s) Oral before breakfast  simvastatin 20 milliGRAM(s) Oral at bedtime  sodium chloride 0.65% Nasal 1 Spray(s) Both Nostrils two times a day PRN  tamsulosin 0.4 milliGRAM(s) Oral at bedtime      ANTIBIOTICS:  meropenem  IVPB 1000 milliGRAM(s) IV Intermittent every 12 hours

## 2021-02-24 NOTE — PROGRESS NOTE ADULT - ASSESSMENT
IMPRESSION:  Acute hypoxemic respiratory failure  COVID-19 PNA  HO SMILEY, noncompliant with CPAP  HO Afib, on coumadin at home  DVT ?? PE   PRAVIN       PLAN:    CNS: Avoid CNS depressants.     HEENT: Oral care    PULMONARY:  HOB @ 45 degrees.  Aspiration precautions. trial of HF.  Alternate wiht NIV.  NIV during sleep.  Rebecca BALLARD#10.       CARDIOVASCULAR: Avoid volume overload. Repeat BNP. Rate control.  Gentle hydration     GI: GI prophylaxis.  Feeding as tolerated when off AVAPS.  Bowel regimen     RENAL:  Follow up lytes.  Correct as needed.     INFECTIOUS: DISEASE: FU with ID.       HEMATOLOGICAL:  DVT therapy.  Heparin drip.  Monitor CBC and PTT     ENDOCRINE:  Follow up FS.  Insulin protocol if needed.    MUSCULOSKELETAL: bedrest    Overall poor prognosis    CEU

## 2021-02-24 NOTE — PROGRESS NOTE ADULT - SUBJECTIVE AND OBJECTIVE BOX
Patient is a 76y old  Male who presents with a chief complaint of COVID-19 pneumonia (22 Feb 2021 14:53)        Over Night Events:  On NIV this am.          ROS:     All ROS are negative except HPI         PHYSICAL EXAM    ICU Vital Signs Last 24 Hrs  T(C): 37 (24 Feb 2021 04:00), Max: 37.1 (23 Feb 2021 12:30)  T(F): 98.6 (24 Feb 2021 04:00), Max: 98.7 (23 Feb 2021 12:30)  HR: 100 (24 Feb 2021 07:00) (83 - 146)  BP: 109/75 (24 Feb 2021 07:00) (98/66 - 127/84)  BP(mean): 85 (24 Feb 2021 07:00) (80 - 98)  ABP: --  ABP(mean): --  RR: 25 (24 Feb 2021 07:00) (20 - 30)  SpO2: 99% (24 Feb 2021 07:00) (90% - 99%)      CONSTITUTIONAL:  Well nourished.  NAD    ENT:   Airway patent,   Mouth with normal mucosa.   No thrush    EYES:   Pupils equal,   Round and reactive to light.    CARDIAC:   Normal rate,   Irregular rhythm.    No edema      Vascular:  Normal systolic impulse  No Carotid bruits    RESPIRATORY:   No wheezing  Bilateral BS  Normal chest expansion  Not tachypneic,  No use of accessory muscles    GASTROINTESTINAL:  Abdomen soft,   Non-tender,   No guarding,   + BS    MUSCULOSKELETAL:   Range of motion is not limited,  No clubbing, cyanosis    NEUROLOGICAL:   Alert   No motor  deficits.    SKIN:   Skin normal color for race,   Warm and dry and intact.   No evidence of rash.    PSYCHIATRIC:   Normal mood and affect.   No apparent risk to self or others.    HEMATOLOGICAL:  No cervical  lymphadenopathy.  no inguinal lymphadenopathy      02-23-21 @ 07:01  -  02-24-21 @ 07:00  --------------------------------------------------------  IN:    Amiodarone: 384.1 mL    Diltiazem: 315 mL    Heparin: 253 mL    IV PiggyBack: 50 mL  Total IN: 1002.1 mL    OUT:    Voided (mL): 2100 mL  Total OUT: 2100 mL    Total NET: -1097.9 mL          LABS:                            16.9   20.16 )-----------( 226      ( 23 Feb 2021 08:07 )             50.5                                               02-23    138  |  100  |  100<HH>  ----------------------------<  212<H>  4.8   |  19  |  2.3<H>    Creatinine Trend  , Cr 2.3, (02-23-21 @ 08:07)  Creatinine Trend  , Cr 2.3, (02-22-21 @ 18:33)  Creatinine Trend  , Cr 2.7, (02-22-21 @ 08:07)  Creatinine Trend  , Cr 2.7, (02-21-21 @ 04:30)  Creatinine Trend  BUN 84, Cr 2.0, (02-20-21 @ 20:05)  Creatinine Trend  BUN 83, Cr 1.9, (02-20-21 @ 16:51)  Creatinine Trend  BUN 77, Cr 1.9, (02-20-21 @ 12:27)      Ca    8.1<L>      23 Feb 2021 08:07  Mg     3.7     02-23    TPro  6.6  /  Alb  3.0<L>  /  TBili  0.9  /  DBili  x   /  AST  39  /  ALT  18  /  AlkPhos  144<H>  02-22      PT/INR - ( 22 Feb 2021 08:07 )   PT: 14.50 sec;   INR: 1.26 ratio         PTT - ( 23 Feb 2021 21:45 )  PTT:65.8 sec                                                                                     LIVER FUNCTIONS - ( 22 Feb 2021 08:07 )  Alb: 3.0 g/dL / Pro: 6.6 g/dL / ALK PHOS: 144 U/L / ALT: 18 U/L / AST: 39 U/L / GGT: x                                                                                                                                       MEDICATIONS  (STANDING):  aMIOdarone Infusion 0.501 mG/Min (16.7 mL/Hr) IV Continuous <Continuous>  aMIOdarone Infusion 0.501 mG/Min (16.7 mL/Hr) IV Continuous <Continuous>  dexAMETHasone  Injectable 6 milliGRAM(s) IV Push daily  diltiazem Infusion 10 mG/Hr (10 mL/Hr) IV Continuous <Continuous>  furosemide    Tablet 40 milliGRAM(s) Oral daily  heparin  Infusion 1100 Unit(s)/Hr (11 mL/Hr) IV Continuous <Continuous>  influenza   Vaccine 0.5 milliLiter(s) IntraMuscular once  insulin glargine Injectable (LANTUS) 30 Unit(s) SubCutaneous at bedtime  insulin lispro (ADMELOG) corrective regimen sliding scale   SubCutaneous three times a day before meals  insulin lispro (ADMELOG) corrective regimen sliding scale   SubCutaneous at bedtime  insulin lispro Injectable (ADMELOG) 12 Unit(s) SubCutaneous three times a day before meals  meropenem  IVPB 1000 milliGRAM(s) IV Intermittent every 12 hours  metoprolol tartrate Injectable 5 milliGRAM(s) IV Push every 6 hours  pantoprazole    Tablet 40 milliGRAM(s) Oral before breakfast  simvastatin 20 milliGRAM(s) Oral at bedtime  tamsulosin 0.4 milliGRAM(s) Oral at bedtime    MEDICATIONS  (PRN):  acetaminophen   Tablet .. 650 milliGRAM(s) Oral every 4 hours PRN Temp greater or equal to 38.5C (101.3F)  sodium chloride 0.65% Nasal 1 Spray(s) Both Nostrils two times a day PRN Nasal Congestion      New X-rays reviewed:                                                                                  ECHO    CXR interpreted by me:  Improving infiltrates

## 2021-02-25 NOTE — PROGRESS NOTE ADULT - SUBJECTIVE AND OBJECTIVE BOX
SUBJ:  Patient seen and spoke to house staff    MEDICATIONS  (STANDING):  aMIOdarone    Tablet 200 milliGRAM(s) Oral every 12 hours  diltiazem Infusion 10 mG/Hr (10 mL/Hr) IV Continuous <Continuous>  heparin  Infusion 1100 Unit(s)/Hr (11 mL/Hr) IV Continuous <Continuous>  influenza   Vaccine 0.5 milliLiter(s) IntraMuscular once  insulin glargine Injectable (LANTUS) 30 Unit(s) SubCutaneous at bedtime  insulin lispro (ADMELOG) corrective regimen sliding scale   SubCutaneous three times a day before meals  insulin lispro (ADMELOG) corrective regimen sliding scale   SubCutaneous at bedtime  insulin lispro Injectable (ADMELOG) 12 Unit(s) SubCutaneous three times a day before meals  lactated ringers. 1000 milliLiter(s) (50 mL/Hr) IV Continuous <Continuous>  metoprolol tartrate 50 milliGRAM(s) Oral two times a day  pantoprazole    Tablet 40 milliGRAM(s) Oral before breakfast  simvastatin 20 milliGRAM(s) Oral at bedtime  tamsulosin 0.4 milliGRAM(s) Oral at bedtime    MEDICATIONS  (PRN):  acetaminophen   Tablet .. 650 milliGRAM(s) Oral every 4 hours PRN Temp greater or equal to 38.5C (101.3F)  sodium chloride 0.65% Nasal 1 Spray(s) Both Nostrils two times a day PRN Nasal Congestion            Vital Signs Last 24 Hrs  T(C): 35.6 (25 Feb 2021 17:11), Max: 37.2 (25 Feb 2021 05:30)  T(F): 96 (25 Feb 2021 17:11), Max: 98.9 (25 Feb 2021 05:30)  HR: 77 (25 Feb 2021 18:55) (72 - 91)  BP: 116/77 (25 Feb 2021 18:55) (113/74 - 142/81)  BP(mean): --  RR: 18 (25 Feb 2021 18:55) (18 - 26)  SpO2: 98% (25 Feb 2021 18:55) (92% - 99%)     	  TELEMETRY: Atrial fibrillation rate controlled better.    ECG:    TTE:    LABS:                        15.6   21.85 )-----------( 261      ( 25 Feb 2021 08:14 )             47.4     02-25    133<L>  |  97<L>  |  120<HH>  ----------------------------<  278<H>  5.7<H>   |  19  |  2.2<H>    Ca    8.6      25 Feb 2021 08:14  Mg     3.9     02-25    TPro  6.2  /  Alb  2.6<L>  /  TBili  0.8  /  DBili  x   /  AST  34  /  ALT  19  /  AlkPhos  141<H>  02-25        PT/INR - ( 25 Feb 2021 08:14 )   PT: 15.20 sec;   INR: 1.32 ratio         PTT - ( 25 Feb 2021 08:14 )  PTT:66.4 sec    I&O's Summary    24 Feb 2021 07:01  -  25 Feb 2021 07:00  --------------------------------------------------------  IN: 1808.1 mL / OUT: 0 mL / NET: 1808.1 mL    25 Feb 2021 07:01  -  25 Feb 2021 20:08  --------------------------------------------------------  IN: 144 mL / OUT: 200 mL / NET: -56 mL      BNP  RADIOLOGY & ADDITIONAL STUDIES:    IMPRESSION AND PLAN:    1) Persistent atrial fibrillation  presently rate is controlled   Continue Amiodarone to 200 mg po q12h   Continue Metoprolol 50 mg po q12h  Start cardizem 60 mg po q8h from tomorrow.   Continue heparin drip as recommended by hematology.    2)DVT and possible PE  Continue heparin drip.  We can add ASA 81 mg po q24h since he developed DVT while on therapeutic INR.    3)Hypoxic respiratory failure COVID - 19 pneumonia on CPAP  F/U Pulmonary and critical care recommendation.

## 2021-02-25 NOTE — PROGRESS NOTE ADULT - ASSESSMENT
77 yo M with PMx Chronic Afib on Coumadin, HTN, DLD, DM II,  with recent positive COVID test on 02/11 who presented with 1 week of SOB on 02/12, transferred to CEU due to acute hypoxic respiratory failure due to COVID PNA, complicated by DVT and possible PE    Acute Hypoxemic Respiratory Failure Secondary to COVID Pneumonia  Provoked DVT/Possible PE  Tolerated HFNC 60/100 this AM, switched to bipap in afternoon, c/w alternating  s/p RDV 2/13-2/17, s/p 1u convalescent plasma 2/13  s/p 10 days course of decadron  Chest xray with worsening opacieites, no pneumomediastinum  ID on board  taper o2 as tolerated  trend inflammatory markers   LE duplex (02/19): left common femoral, femoral, popliteal DVT, left gastrocnemius, right small superficial thrombosis  continue with Heparin Gtt for  now, monitor PTTs    PRAVIN, likely prerenal  Scr 2.2 today, baseline 1.1-1.3  Lasix 40 on hold for now  on gentle hydration LR 50cc hr and monitor daily BMP  monitor I&O  renal eval Dr Grady    Chronic Atrial Fibrillation - uncontrolled  Hypertension  HR better controlled today  Cardio on board, change IV lopressor to metorpolol 50 Q12H, Amiodarone 200 Q12H, continue cardizem gtt and heparin GTT  case discussed with heme/onc, given extensive DVTs while on coumadin, only start bridging to coumadin once Covid infection resolves     DM II  Hba1c 11  continune with Lantus 30 and Lispro 12 AC with ISS  monitor FS    Patient requires continuous monitoring in CEU

## 2021-02-25 NOTE — PROGRESS NOTE ADULT - ASSESSMENT
IMPRESSION:  Acute hypoxemic respiratory failure  COVID-19 PNA  HO SMILEY, noncompliant with CPAP  HO Afib, on coumadin at home  DVT ?? PE   PRAVIN       PLAN:    CNS: Avoid CNS depressants.     HEENT: Oral care    PULMONARY:  HOB @ 45 degrees.  Aspiration precautions. trial of HF.  Alternate wiht NIV.  NIV during sleep.  DC Dexa.       CARDIOVASCULAR: Avoid volume overload. FU Repeat BNP. Rate control.  Gentle hydration.  if tolerates off NIV, Switch to oral rate control     GI: GI prophylaxis.  Feeding as tolerated when off AVAPS.  Bowel regimen     RENAL:  Follow up lytes.  Correct as needed.     INFECTIOUS: DISEASE: FU with ID.       HEMATOLOGICAL:  DVT therapy.  Heparin drip.  Monitor CBC and PTT     ENDOCRINE:  Follow up FS.  Insulin protocol if needed.    MUSCULOSKELETAL: bedrest    Overall poor prognosis    CEU

## 2021-02-25 NOTE — PROGRESS NOTE ADULT - SUBJECTIVE AND OBJECTIVE BOX
ELIZA POWELL  76y, Male    All available historical data reviewed    OVERNIGHT EVENTS:  no fevers  BIPAP    ROS:  General: Denies rigors, nightsweats  HEENT: Denies headache, rhinorrhea, sore throat, eye pain  CV: Denies CP, palpitations  PULM: Denies wheezing, hemoptysis  GI: Denies hematemesis, hematochezia, melena  : Denies discharge, hematuria  MSK: Denies arthralgias, myalgias  SKIN: Denies rash, lesions  NEURO: Denies paresthesias, weakness  PSYCH: Denies depression, anxiety    VITALS:  T(F): 96.4, Max: 98.9 (02-24-21 @ 19:45)  HR: 75  BP: 117/76  RR: 26Vital Signs Last 24 Hrs  T(C): 35.8 (25 Feb 2021 07:50), Max: 37.2 (24 Feb 2021 19:45)  T(F): 96.4 (25 Feb 2021 07:50), Max: 98.9 (24 Feb 2021 19:45)  HR: 75 (25 Feb 2021 08:00) (70 - 93)  BP: 117/76 (25 Feb 2021 08:00) (116/72 - 131/74)  BP(mean): 98 (24 Feb 2021 17:00) (92 - 102)  RR: 26 (25 Feb 2021 08:00) (20 - 26)  SpO2: 97% (25 Feb 2021 08:00) (90% - 99%)    TESTS & MEASUREMENTS:                        15.6   21.85 )-----------( 261      ( 25 Feb 2021 08:14 )             47.4     02-25    133<L>  |  97<L>  |  120<HH>  ----------------------------<  278<H>  5.7<H>   |  19  |  2.2<H>    Ca    8.6      25 Feb 2021 08:14  Mg     3.9     02-25    TPro  6.2  /  Alb  2.6<L>  /  TBili  0.8  /  DBili  x   /  AST  34  /  ALT  19  /  AlkPhos  141<H>  02-25    LIVER FUNCTIONS - ( 25 Feb 2021 08:14 )  Alb: 2.6 g/dL / Pro: 6.2 g/dL / ALK PHOS: 141 U/L / ALT: 19 U/L / AST: 34 U/L / GGT: x                   RADIOLOGY & ADDITIONAL TESTS:  Personal review of radiological diagnostics performed  Echo and EKG results noted when applicable.     MEDICATIONS:  acetaminophen   Tablet .. 650 milliGRAM(s) Oral every 4 hours PRN  aMIOdarone    Tablet 200 milliGRAM(s) Oral every 12 hours  diltiazem Infusion 10 mG/Hr IV Continuous <Continuous>  heparin  Infusion 1100 Unit(s)/Hr IV Continuous <Continuous>  influenza   Vaccine 0.5 milliLiter(s) IntraMuscular once  insulin glargine Injectable (LANTUS) 30 Unit(s) SubCutaneous at bedtime  insulin lispro (ADMELOG) corrective regimen sliding scale   SubCutaneous three times a day before meals  insulin lispro (ADMELOG) corrective regimen sliding scale   SubCutaneous at bedtime  insulin lispro Injectable (ADMELOG) 12 Unit(s) SubCutaneous three times a day before meals  lactated ringers. 1000 milliLiter(s) IV Continuous <Continuous>  metoprolol tartrate 50 milliGRAM(s) Oral two times a day  pantoprazole    Tablet 40 milliGRAM(s) Oral before breakfast  simvastatin 20 milliGRAM(s) Oral at bedtime  sodium chloride 0.65% Nasal 1 Spray(s) Both Nostrils two times a day PRN  tamsulosin 0.4 milliGRAM(s) Oral at bedtime      ANTIBIOTICS:

## 2021-02-25 NOTE — PROGRESS NOTE ADULT - ASSESSMENT
Assessment and Plan  Case of a 76 year old male patient with recent positive COVID test on 02/11 who presented with 1 week of SOB on 02/12, found to have COVID pneumonia, admitted on 02/12 for hypoxic RF secondary to COVID pneumonia with stay complicated by acute DVT and possible PE. Currently hemodynamically stable.      Acute Hypoxic Respiratory Failure Secondary to COVID Pneumonia  * Recent COVID positive test on 02/11  * SARS-COV2: positive 02/12  * Chest X Ray: 02/12 bilateral opacities  * Markers as below    - Infectious Disease and Pulmonary team are on board  - Monitor for fever: afebrile in last 24 hours. Tylenol PRN for fever  - Trend WBC: 02/22 20.46 -> 02/23 20.16 -> 02/24 20.72  - Monitor SaO2 and Oxygen Requirements: 02/24 alternating between HF 60L FiO2 100% for 2 hours then NIV AVAPs EPAP 10 FiO2 100% for 4h with S94%  - Follow up Chest X Ray on 02/25. Last Chest X Ray on 02/24 unchanged bilateral opacities  - Trend Inflammatory Markers:  --> D-dimer 68 02/12 -> 2959 02/22 -> 3921 02/24 -> 02/26  --> Procalcitonin 0.06 02/12 -> 0.24 (02/20) -> 0.28 02/21 -> 02/22 00.18 -> 02/24 0.12 -> 02/26  --> C-reactive protein 02/12 8.53 -> 02/17 5.99 -> 02/18 9.66 -> 13.5 02/21 -> 02/22 6.78 -> 02/24 2.45 -> 02/26  --> LDH 02/12 263 -> 442 (02/16)  --> Ferritin 02/12 982 -> 1137 (02/13) -> 1246 (02/15) -> 1231 (02/17) -> 02/22 2398 -> 02/25 received  - Follow up blood cultures: received on 11:00 AM 02/24  - COVID therapy:  --> S/P 1 unit of convalescent plasma 02/13  --> Completed a 5 day course of IV Remdesevir: S/P 100mg QD from 02/13-02/17  --> Started on IV Dexamethasone 6mg QD on 02/15 for 10 days  --> Monitor POCT (details under DM section)  - Therapeutic Anticoagulation Heparin Drip (details under Acute DVT and possible PE)      Provoked DVT  Possible PE  * Duplex Venous (02/19): left common femoral, femoral, popliteal DVT, left gastrocnemius, right small superficial thrombosis  * Home med: coumadin for afib  * D-dimer as above under COVID pneumonia  * ED 02/12 INR 1.83 s/p Vitamin K x2 doses  - Heme onc on board for supra therapeutic INR in ED and presumed PE/ DIC  - Continue Heparin Drip for now: last PTT 04:30 AM therapeutic so continued at 11ml/hour -> Will only order aPTT QD from now on   - Last coumadin dose 3mg x1 02/14. Holding coumadin for now but will consider resumption soon in preparation for discharge on coumadin and Aspirin 81mg QD per Dr Carl      Pre-Renal PRAVIN Secondary to reduced PO intake on NR and possibly the IV Lasix dose of 40mg on 02/20  * Possibly Secondary to reduced PO intake on NR and possibly the IV Lasix dose of 40mg on 02/20  * Baseline Cr 1.1 -> 02/20 BUN 84, Cr 2.0  * 02/21 and 02/22 Cr 2.7  - Monitor BUN and Cr: 02/25 Cr 2.2  - Will hold PO Lasix 40mg QD for now 02/24 and start gentle IV fluid hydration with LR at 50mL/hour on 02/24  - Monitor I/O: 02/25 net +597mL      Chronic Atrial Fibrillation  * Rate uncontrolled possibly 2ry to ARF  * Home coumadin 3mg QD, Cardizem 240mg QD  * ECG 02/13 afib with RVR  - Cardiology Dr Palacios on board: last on 02/21  - Monitor HR 02/23 max HR 124bpm in PM -> 02/24 88, 118, 101 bpm -> 02/25 82 bpm  - Switched IV Lopressor 5mg Q6h to PO Lopressor 5mg BID on 02/25  - Switched from Amio drip at 16.7ml/hour to PO Amiodarone 200mg BID on 02/25   - Will continue Cardizem drip at 5ml/hour for now (since 02/21). Once patient tolerate PO meds, will switch to PO Cardizem 60mg Q8h on DC and stop amiodarone PO  - S/P IV Digoxin 0.25mg x2 doses on 02/22. FU digoxin level 02/23: 0.3  - For AC: heparin drip as above  - Keep K>4, Mg>2      Hypertension  * Home meds Losartan 50mg BID, Cardizem 240mg QD, and Lasix 40mg QD  - Monitor BP: 02/23 108/70 (84) mmHg -> 02/25 126/83 mmHg  - Holding Lasix PO 40mg QD starting 02/24 for pre-renal PRAVIN  - Switched IV Lopressor 5mg Q6h to PO Lopressor 5mg BID on 02/25  - Switched from Amio drip at 16.7ml/hour to PO Amiodarone 200mg BID on 02/25   - Will continue Cardizem drip at 5ml/hour for now (since 02/21). Once patient tolerate PO meds, will switch to PO Cardizem 60mg Q8h on DC and stop amiodarone PO        DM II  * a1c 02/13 11  * Home metformin 500mg BID  - Monitor POCT 02/24 209, 267, 287  - Continue Lantus 30u, Lispro 12 u TID, and Scale B      GERD  * Home Omperazole 20mg QD  - Protonix 40mg QD PO      DL  * No lipid profile  * Home simvastatin 20mg QD  - Continue Simvastatin 20mg QD      Others  - DVT Prophylaxis: Heparin Drip as above  - GI Prophylaxis: Pantoprazole 40mg PO QD  - Diet: Regular  - Code Status: Full      Barriers to learning: NO  Discharge Planning: Patient will be discharged once stable   Plan was communicated with medical team and RN in charge      Niecy Colmenares MD  PGY - 1 Internal Medicine   Health system

## 2021-02-25 NOTE — PROGRESS NOTE ADULT - ASSESSMENT
· Assessment	  76 year old Male with a past medical history of HTN, HLD, BPH, Hepatomegaly, "Heart valve disorder" ?Afib on Coumadin, presented to the ER with a chief complaint of Weakness and shortness of breath x 1 week, covid + x 1 day. Patient is being admitted for increased weakness secondary to COVID infection     IMPRESSION;  COVID 19 with severe illness. SpO2 < 94% on RA and need for supplemental O2. BIPAP  Pt was  in the early viral replicative phase based on the timeline/onset of symptoms.   COVID-19 antibodies NG  procalcitonin 0.08  , not suggestive of a bacterial PNA.  Ferritin 1246>1231>1231  CRP 7.70 >5.9>13.5>2.45  Ddimers 197>2959 ( suggestive of a pulmonary embolus ) > 3921  Legionella NG  Worsening renal function    s/p RDV   s/p plasma  s/p Dexamethason 2/13-24    RECOMMENDATIONS;  CT chest angio  Target SpO2 92 % to 96 %  Anticoagulation as per team

## 2021-02-25 NOTE — PROGRESS NOTE ADULT - SUBJECTIVE AND OBJECTIVE BOX
Patient is a 76y old  Male who presents with a chief complaint of covid-19 (24 Feb 2021 21:17)        Over Night Events:  On NIV this am.  Off pressors.         ROS:     All ROS are negative except HPI         PHYSICAL EXAM    ICU Vital Signs Last 24 Hrs  T(C): 36.9 (25 Feb 2021 05:33), Max: 37.2 (24 Feb 2021 19:45)  T(F): 98.4 (25 Feb 2021 05:33), Max: 98.9 (24 Feb 2021 19:45)  HR: 74 (25 Feb 2021 05:33) (70 - 109)  BP: 118/76 (25 Feb 2021 05:33) (110/69 - 131/74)  BP(mean): 98 (24 Feb 2021 17:00) (89 - 102)  ABP: --  ABP(mean): --  RR: 20 (25 Feb 2021 05:33) (20 - 26)  SpO2: 98% (25 Feb 2021 05:33) (90% - 99%)      CONSTITUTIONAL:  Well nourished.  NAD    ENT:   Airway patent,   Mouth with normal mucosa.   No thrush    EYES:   Pupils equal,   Round and reactive to light.    CARDIAC:   Normal rate,   Regular rhythm.    No edema      Vascular:  Normal systolic impulse  No Carotid bruits    RESPIRATORY:   No wheezing  Bilateral crackles   Normal chest expansion  Not tachypneic,  No use of accessory muscles    GASTROINTESTINAL:  Abdomen soft,   Non-tender,   No guarding,   + BS    MUSCULOSKELETAL:   Range of motion is not limited,  No clubbing, cyanosis    NEUROLOGICAL:   Alert and oriented   No motor  deficits.    SKIN:   Skin normal color for race,   Warm and dry and intact.   No evidence of rash.    PSYCHIATRIC:   Normal mood and affect.   No apparent risk to self or others.    HEMATOLOGICAL:  No cervical  lymphadenopathy.  no inguinal lymphadenopathy      02-24-21 @ 07:01  -  02-25-21 @ 07:00  --------------------------------------------------------  IN:    Amiodarone: 384.1 mL    Diltiazem: 155 mL    Heparin: 253 mL    Lactated Ringers: 1000 mL  Total IN: 1792.1 mL    OUT:  Total OUT: 0 mL    Total NET: 1792.1 mL          LABS:                            16.4   20.72 )-----------( 262      ( 24 Feb 2021 08:12 )             48.7                                               02-24    137  |  100  |  111<HH>  ----------------------------<  246<H>  4.9   |  20  |  2.2<H>    Ca    8.1<L>      24 Feb 2021 08:12  Mg     3.8     02-24    TPro  6.4  /  Alb  2.8<L>  /  TBili  0.8  /  DBili  x   /  AST  31  /  ALT  19  /  AlkPhos  129<H>  02-24      PTT - ( 24 Feb 2021 08:12 )  PTT:61.7 sec                                                                                     LIVER FUNCTIONS - ( 24 Feb 2021 08:12 )  Alb: 2.8 g/dL / Pro: 6.4 g/dL / ALK PHOS: 129 U/L / ALT: 19 U/L / AST: 31 U/L / GGT: x                                                                                                                                       MEDICATIONS  (STANDING):  aMIOdarone Infusion 0.501 mG/Min (16.7 mL/Hr) IV Continuous <Continuous>  aMIOdarone Infusion 0.501 mG/Min (16.7 mL/Hr) IV Continuous <Continuous>  diltiazem Infusion 10 mG/Hr (10 mL/Hr) IV Continuous <Continuous>  heparin  Infusion 1100 Unit(s)/Hr (11 mL/Hr) IV Continuous <Continuous>  influenza   Vaccine 0.5 milliLiter(s) IntraMuscular once  insulin glargine Injectable (LANTUS) 30 Unit(s) SubCutaneous at bedtime  insulin lispro (ADMELOG) corrective regimen sliding scale   SubCutaneous three times a day before meals  insulin lispro (ADMELOG) corrective regimen sliding scale   SubCutaneous at bedtime  insulin lispro Injectable (ADMELOG) 12 Unit(s) SubCutaneous three times a day before meals  lactated ringers. 1000 milliLiter(s) (50 mL/Hr) IV Continuous <Continuous>  meropenem  IVPB 1000 milliGRAM(s) IV Intermittent every 12 hours  metoprolol tartrate Injectable 5 milliGRAM(s) IV Push every 6 hours  pantoprazole    Tablet 40 milliGRAM(s) Oral before breakfast  simvastatin 20 milliGRAM(s) Oral at bedtime  tamsulosin 0.4 milliGRAM(s) Oral at bedtime    MEDICATIONS  (PRN):  acetaminophen   Tablet .. 650 milliGRAM(s) Oral every 4 hours PRN Temp greater or equal to 38.5C (101.3F)  sodium chloride 0.65% Nasal 1 Spray(s) Both Nostrils two times a day PRN Nasal Congestion      New X-rays reviewed:                                                                                  ECHO    CXR interpreted by me:  Bilateral infiltrates

## 2021-02-25 NOTE — PROGRESS NOTE ADULT - SUBJECTIVE AND OBJECTIVE BOX
ELIZA POWELL  76y Male    CHIEF COMPLAINT:    Patient is a 76y old  Male who presents with a chief complaint of covid-19 (24 Feb 2021 21:17)      INTERVAL HPI/OVERNIGHT EVENTS:    Patient seen and examined. No acute events overnight. Tolerated HFNC this am    ROS: All other systems are negative.    Vital Signs:    T(F): 96.4 (02-25-21 @ 07:50), Max: 98.9 (02-24-21 @ 19:45)  HR: 81 (02-25-21 @ 15:00) (72 - 91)  BP: 113/74 (02-25-21 @ 15:00) (113/74 - 127/82)  RR: 22 (02-25-21 @ 15:00) (20 - 26)  SpO2: 98% (02-25-21 @ 15:00) (90% - 99%)    24 Feb 2021 07:01  -  25 Feb 2021 07:00  --------------------------------------------------------  IN: 1808.1 mL / OUT: 0 mL / NET: 1808.1 mL    25 Feb 2021 07:01  -  25 Feb 2021 15:27  --------------------------------------------------------  IN: 144 mL / OUT: 200 mL / NET: -56 mL    POCT Blood Glucose.: 233 mg/dL (25 Feb 2021 11:21)  POCT Blood Glucose.: 257 mg/dL (25 Feb 2021 08:32)  POCT Blood Glucose.: 248 mg/dL (25 Feb 2021 07:54)  POCT Blood Glucose.: 287 mg/dL (24 Feb 2021 21:18)    PHYSICAL EXAM:    GENERAL:  NAD  SKIN: No rashes or lesions  HEENT: Atraumatic. Normocephalic.    NECK: Supple, No JVD.    PULMONARY: CTA B/L. No wheezing. No rales  CVS: Normal S1, S2. Rate and Rhythm are regular   ABDOMEN/GI: Soft, Nontender, Nondistended; BS present  MSK:  no clubbing or cyanosis  NEUROLOGIC: moves all extremities  PSYCH: Alert & oriented x 3    Consultant(s) Notes Reviewed:  [x ] YES  [ ] NO  Care Discussed with Consultants/Other Providers [ x] YES  [ ] NO    LABS:                        15.6   21.85 )-----------( 261      ( 25 Feb 2021 08:14 )             47.4     133<L>  |  97<L>  |  120<HH>  ----------------------------<  278<H>  5.7<H>   |  19  |  2.2<H>    Ca    8.6      25 Feb 2021 08:14  Mg     3.9     02-25    TPro  6.2  /  Alb  2.6<L>  /  TBili  0.8  /  DBili  x   /  AST  34  /  ALT  19  /  AlkPhos  141<H>  02-25    PT/INR - ( 25 Feb 2021 08:14 )   PT: 15.20 sec;   INR: 1.32 ratio       PTT - ( 25 Feb 2021 08:14 )  PTT:66.4 sec  Serum Pro-Brain Natriuretic Peptide: 1086 pg/mL (02-20-21 @ 12:27)    RADIOLOGY & ADDITIONAL TESTS:  < from: Xray Chest 1 View- PORTABLE-Urgent (Xray Chest 1 View- PORTABLE-Urgent .) (02.24.21 @ 17:12) >  Impression:    Bilateralopacifications, worsening.    No pneumomediastinum.    < end of copied text >    Imaging or report Personally Reviewed:  [x] YES  [ ] NO  EKG reviewed: [x] YES  [ ] NO    Medications:  Standing  aMIOdarone    Tablet 200 milliGRAM(s) Oral every 12 hours  diltiazem Infusion 10 mG/Hr IV Continuous <Continuous>  heparin  Infusion 1100 Unit(s)/Hr IV Continuous <Continuous>  influenza   Vaccine 0.5 milliLiter(s) IntraMuscular once  insulin glargine Injectable (LANTUS) 30 Unit(s) SubCutaneous at bedtime  insulin lispro (ADMELOG) corrective regimen sliding scale   SubCutaneous three times a day before meals  insulin lispro (ADMELOG) corrective regimen sliding scale   SubCutaneous at bedtime  insulin lispro Injectable (ADMELOG) 12 Unit(s) SubCutaneous three times a day before meals  lactated ringers. 1000 milliLiter(s) IV Continuous <Continuous>  metoprolol tartrate 50 milliGRAM(s) Oral two times a day  pantoprazole    Tablet 40 milliGRAM(s) Oral before breakfast  simvastatin 20 milliGRAM(s) Oral at bedtime  tamsulosin 0.4 milliGRAM(s) Oral at bedtime    PRN Meds  acetaminophen   Tablet .. 650 milliGRAM(s) Oral every 4 hours PRN  sodium chloride 0.65% Nasal 1 Spray(s) Both Nostrils two times a day PRN

## 2021-02-25 NOTE — PROGRESS NOTE ADULT - SUBJECTIVE AND OBJECTIVE BOX
Progress Note  This morning patient was seen and examined at bedside.    Today is hospital day 10d.  Mr. Smiley is doing fine.   He reports he had a good night sleep. He is currently alternating between HF 60L 100% for 2 hours and NIV for 4 hours during day since 02/24. His appetite is reduced, but he denies nausea or vomiting. He denies any abdominal pain, diarrhea, or constipation. He had a BM was a few days ago. He is not ambulating and denies any chest pain, palpitations, or light headedness. He is voiding freely and denies urinary symptoms (dysuria, urgency, frequency, intermittence).      Vital Signs in the last 24 hours   Vitals Summary T(C): 35.8 (02-25-21 @ 07:50), Max: 37.2 (02-24-21 @ 19:45)  HR: 75 (02-25-21 @ 12:00) (72 - 91)  BP: 127/77 (02-25-21 @ 12:00) (116/72 - 131/74)  RR: 25 (02-25-21 @ 12:00) (20 - 26)  SpO2: 93% (02-25-21 @ 12:00) (90% - 99%)  Vent Data   Intake/ Output   02-24-21 @ 07:01  -  02-25-21 @ 07:00  --------------------------------------------------------  IN: 1808.1 mL / OUT: 0 mL / NET: 1808.1 mL    02-25-21 @ 07:01  -  02-25-21 @ 14:13  --------------------------------------------------------  IN: 96 mL / OUT: 200 mL / NET: -104 mL      Physical Exam  * General Appearance: Alert, cooperative, interactive, well-groomed, oriented to time, place, and person, in no acute distress  * Head: Normocephalic, without obvious abnormality, atraumatic  * Lungs: Respirations unlabored, Good bilateral air entry, normal breath sounds (Clear to auscultation bilaterally, no audible wheezes, crackles, or rhonchi)  * Chest Wall: No tenderness or deformity  * Heart: Regular Rate and Rhythm, normal S1 and S2, no audible murmur, rub, or gallop  * Abdomen: Symmetric, non-distended, no scar, soft, non-tender, bowel sounds active all four quadrants, no masses, no organomegaly (no hepatosplenomegaly)  * Extremities: Extremities normal, atraumatic, no cyanosis, no lower extremity pitting edema bilaterally, adequate dorsalis pedis pulses  * Pulses: 2+ and symmetric all extremities  * Skin: Skin color, texture, turgor normal, no rashes or lesions  * Lymph nodes: Cervical, supraclavicular, and axillary nodes normal  * Neurologic: CNII-XII intact, normal strength, sensation and reflexes  throughout      Investigations   Laboratory Workup  - CBC:                        15.6   21.85 )-----------( 261      ( 25 Feb 2021 08:14 )             47.4     - Chemistry:  02-25    133<L>  |  97<L>  |  120<HH>  ----------------------------<  278<H>  5.7<H>   |  19  |  2.2<H>    Ca    8.6      25 Feb 2021 08:14  Mg     3.9     02-25    TPro  6.2  /  Alb  2.6<L>  /  TBili  0.8  /  DBili  x   /  AST  34  /  ALT  19  /  AlkPhos  141<H>  02-25    - Coagulation Studies:  PT/INR - ( 25 Feb 2021 08:14 )   PT: 15.20 sec;   INR: 1.32 ratio    PTT - ( 25 Feb 2021 08:14 )  PTT:66.4 sec        Current Medications  Standing Medications  aMIOdarone    Tablet 200 milliGRAM(s) Oral every 12 hours  diltiazem Infusion 10 mG/Hr (10 mL/Hr) IV Continuous <Continuous>  heparin  Infusion 1100 Unit(s)/Hr (11 mL/Hr) IV Continuous <Continuous>  influenza   Vaccine 0.5 milliLiter(s) IntraMuscular once  insulin glargine Injectable (LANTUS) 30 Unit(s) SubCutaneous at bedtime  insulin lispro (ADMELOG) corrective regimen sliding scale   SubCutaneous three times a day before meals  insulin lispro (ADMELOG) corrective regimen sliding scale   SubCutaneous at bedtime  insulin lispro Injectable (ADMELOG) 12 Unit(s) SubCutaneous three times a day before meals  lactated ringers. 1000 milliLiter(s) (50 mL/Hr) IV Continuous <Continuous>  metoprolol tartrate 50 milliGRAM(s) Oral two times a day  pantoprazole    Tablet 40 milliGRAM(s) Oral before breakfast  simvastatin 20 milliGRAM(s) Oral at bedtime  tamsulosin 0.4 milliGRAM(s) Oral at bedtime    PRN Medications  acetaminophen   Tablet .. 650 milliGRAM(s) Oral every 4 hours PRN Temp greater or equal to 38.5C (101.3F)  sodium chloride 0.65% Nasal 1 Spray(s) Both Nostrils two times a day PRN Nasal Congestion    Singles Doses Administered  (Floorstock) 1 each &lt;see task&gt; GiveOnce  (Floorstock) 1 each &lt;see task&gt; GiveOnce  (Floorstock) 1 each &lt;see task&gt; GiveOnce  aMIOdarone Infusion 0.501 mG/Min IV Continuous <Continuous>  aMIOdarone IVPB 150 milliGRAM(s) IV Intermittent once  dexAMETHasone  Injectable 6 milliGRAM(s) IV Push daily  digoxin  Injectable 0.25 milliGRAM(s) IV Push once  diltiazem    milliGRAM(s) Oral once  diltiazem Injectable 24 milliGRAM(s) IV Push once  diltiazem Injectable 10 milliGRAM(s) IV Push once PRN  diltiazem Injectable 10 milliGRAM(s) IV Push once  diltiazem Injectable 10 milliGRAM(s) IV Push once  furosemide   Injectable 40 milliGRAM(s) IV Push once  furosemide   Injectable 40 milliGRAM(s) IV Push once  insulin lispro Injectable (ADMELOG) 6 Unit(s) SubCutaneous once  insulin lispro Injectable (ADMELOG). 12 Unit(s) SubCutaneous once  metoprolol tartrate Injectable 5 milliGRAM(s) IV Push once  phytonadione  IVPB 2.5 milliGRAM(s) IV Intermittent once  phytonadione  IVPB 5 milliGRAM(s) IV Intermittent once  remdesivir  IVPB 100 milliGRAM(s) IV Intermittent every 24 hours  sodium zirconium cyclosilicate 5 Gram(s) Oral once  sodium zirconium cyclosilicate 5 Gram(s) Oral once

## 2021-02-25 NOTE — CHART NOTE - NSCHARTNOTEFT_GEN_A_CORE
Registered Dietitian Follow-Up     Patient Profile Reviewed                           Yes [x]   No []     Nutrition History Previously Obtained        Yes []  No [x]  Again unable to reach pt via room phone.      Pertinent Subjective Information: Unable to conduct face to face assessment d/t COVID-19 isolation precautions. Again unable to reach pt. Spoke to RN who notes pt was bale to tolerate HFNC for breakfast & lunch today but only ate oatmeal at breakfast & dessert at lunch. Pt reporting to RN that he only wants to eat Jell-O, drinking liquids well. As per EMR intake noted as 10-25%. Previous RD supplement recs remain pending. Again d/w LIP Dr. Colmenares, as pt will likely tolerate supplements better & main souce of intake at this time. Recs at end of document.      Pertinent Medical Interventions:  1. Acute Hypoxic Respiratory Failure 2/2 COVID PNA--alt b/w HFNC 60L 100% x2H and NIV for 4H during day & PRN; afebrile x24H. f/u CXR 2/25  2. Provoked DVT; possible PE  3. Pre-renal PRAVIN 2/2 poor PO intake & possibly d/t lasix--lasix held & IVF started   4. Chronic Afib--rate uncontrolled  5. T2DM--Lantus 30u, Lispro 12 u TID     Diet order: DASH/TLC      Anthropometrics:  - Ht. 177.8cm   - Wt  96kg (2/19) dosing wt   94.2kg (2/23)   - BMI 30.3   - IBW 75.4kg      Pertinent Lab Data: (2/25/21) WBC 21.85, Na 133, K+ 5.7, Cl 97, , Cr 2.2, POCT 233/257, glucose 278, GFR 28, Mg 3.9     Pertinent Meds: heparin infusion, lactated ringers @50mL/hr, lantus, admelog, amiodarone, metoprolol, cardizem, protonix, simvastatin    Physical Findings:  - Appearance: AAOx4   - GI function: no BM since 2/17   - Tubes: n/a   - Oral/Mouth cavity: poor respiratory status w. increased work of chewing   - Skin: abrasion to bridge of nose. no edema per RN flowsheet      Nutrition Requirements  Weight Used: 75.4kg IBW (continued from RD initial assessment)      Estimated Energy Needs    Continue [x]  6540-3824 kcal/day (20-25 kcal/kg IBW d/t BMI 30 & COVID PNA)   Estimated Protein Needs    Continue [x]  75-90 gm/day (1.0-1.2 g/kg IBW, reasons mentioned above)   Estimated Fluid Needs        Continue [x]  per CEU team      Nutrient Intake: not meeting needs      [x] Previous Nutrition Diagnosis:  Inadequate Oral Intake.            [x] Ongoing          [] Resolved     Nutrition Intervention: meals & snacks, medical food supplements, nutrition related medication mgmt  Recommend: **activate pending diet order**   1. Add Glucerna TID & sugar free Prosouce Gelatin BID.   2. Adjust diet order to Dysphagia III mechanical soft, thin liquids + CHO Consistent for optimal tolerance.   3. Check serum vitamin D 25-hydroxy & supplement PRN.   4. Consider adding daily multivitamin w. out minerals. Consider daily appetite stimulant if not contraindicated.      Goal/Expected Outcome: Pt to ideally consume >50% meals & supplements upon f/u in 4 days.      Indicator/Monitoring: RD will monitor diet order, energy intake, nutrition related labs, body composition, NFPF (PO tolerance)

## 2021-02-26 NOTE — PROGRESS NOTE ADULT - SUBJECTIVE AND OBJECTIVE BOX
ELIZA POWELL  76y Male    CHIEF COMPLAINT:    Patient is a 76y old  Male who presents with a chief complaint of COVID-!9 Infection (25 Feb 2021 20:07)      INTERVAL HPI/OVERNIGHT EVENTS:    Patient seen and examined. No acute events overnight.     ROS: All other systems are negative.    Vital Signs:    T(F): 96.8 (02-26-21 @ 12:00), Max: 96.8 (02-26-21 @ 00:00)  HR: 84 (02-26-21 @ 13:00) (71 - 108)  BP: 132/75 (02-26-21 @ 13:00) (101/64 - 142/81)  RR: 22 (02-26-21 @ 13:00) (18 - 33)  SpO2: 95% (02-26-21 @ 13:00) (67% - 99%)    25 Feb 2021 07:01  -  26 Feb 2021 07:00  --------------------------------------------------------  IN: 704 mL / OUT: 1000 mL / NET: -296 mL    26 Feb 2021 07:01  -  26 Feb 2021 13:57  --------------------------------------------------------  IN: 280 mL / OUT: 700 mL / NET: -420 mL    POCT Blood Glucose.: 230 mg/dL (26 Feb 2021 12:22)  POCT Blood Glucose.: 165 mg/dL (26 Feb 2021 07:56)  POCT Blood Glucose.: 156 mg/dL (26 Feb 2021 05:37)  POCT Blood Glucose.: 189 mg/dL (25 Feb 2021 21:44)  POCT Blood Glucose.: 212 mg/dL (25 Feb 2021 20:06)  POCT Blood Glucose.: 229 mg/dL (25 Feb 2021 17:29)    PHYSICAL EXAM:    GENERAL:  NAD  SKIN: No rashes or lesions  HEENT: Atraumatic. Normocephalic. superficial nasal wound  NECK: Supple, No JVD.   PULMONARY: CTA B/L. No wheezing. No rales  CVS: Normal S1, S2. Rate and Rhythm are irregular.   ABDOMEN/GI: Soft, Nontender, Nondistended; BS present  MSK:  No edema B/L LE. No clubbing or cyanosis  NEUROLOGIC: moves all extremities  PSYCH: Alert & oriented x 3, normal affect    Consultant(s) Notes Reviewed:  [x ] YES  [ ] NO  Care Discussed with Consultants/Other Providers [ x] YES  [ ] NO    LABS:                        16.0   20.11 )-----------( 245      ( 26 Feb 2021 07:05 )             48.1     136  |  100  |  102<HH>  ----------------------------<  198<H>  5.3<H>   |  22  |  1.7<H>    Ca    8.3<L>      26 Feb 2021 07:05  Mg     3.6     02-26    TPro  6.0  /  Alb  2.7<L>  /  TBili  0.7  /  DBili  x   /  AST  41  /  ALT  26  /  AlkPhos  134<H>  02-26    PT/INR - ( 26 Feb 2021 07:05 )   PT: 14.50 sec;   INR: 1.26 ratio      PTT - ( 26 Feb 2021 12:08 )  PTT:53.2 sec  Serum Pro-Brain Natriuretic Peptide: 1086 pg/mL (02-20-21 @ 12:27)    Culture - Blood (collected 24 Feb 2021 12:14)  Source: .Blood None  Preliminary Report (25 Feb 2021 18:02):    No growth to date.    RADIOLOGY & ADDITIONAL TESTS:  Imaging or report Personally Reviewed:  [x] YES  [ ] NO  EKG reviewed: [x] YES  [ ] NO    Medications:  Standing  aMIOdarone    Tablet 200 milliGRAM(s) Oral every 12 hours  diltiazem    Tablet 60 milliGRAM(s) Oral every 8 hours  diltiazem Infusion 10 mG/Hr IV Continuous <Continuous>  heparin  Infusion 1100 Unit(s)/Hr IV Continuous <Continuous>  influenza   Vaccine 0.5 milliLiter(s) IntraMuscular once  insulin glargine Injectable (LANTUS) 30 Unit(s) SubCutaneous at bedtime  insulin lispro (ADMELOG) corrective regimen sliding scale   SubCutaneous three times a day before meals  insulin lispro (ADMELOG) corrective regimen sliding scale   SubCutaneous at bedtime  insulin lispro Injectable (ADMELOG) 12 Unit(s) SubCutaneous three times a day before meals  lactated ringers. 1000 milliLiter(s) IV Continuous <Continuous>  metoprolol tartrate 50 milliGRAM(s) Oral two times a day  pantoprazole    Tablet 40 milliGRAM(s) Oral before breakfast  simvastatin 20 milliGRAM(s) Oral at bedtime  tamsulosin 0.4 milliGRAM(s) Oral at bedtime    PRN Meds  acetaminophen   Tablet .. 650 milliGRAM(s) Oral every 4 hours PRN  sodium chloride 0.65% Nasal 1 Spray(s) Both Nostrils two times a day PRN

## 2021-02-26 NOTE — PROGRESS NOTE ADULT - ASSESSMENT
77 yo M with PMx Chronic Afib on Coumadin, HTN, DLD, DM II,  with recent positive COVID test on 02/11 who presented with 1 week of SOB on 02/12, transferred to [facility] due to acute hypoxic respiratory failure due to COVID PNA, complicated by DVT and possible PE    Acute Hypoxemic Respiratory Failure Secondary to COVID Pneumonia  Provoked DVT/Possible PE  Tolerated HFNC 60/100 this AM, switched to bipap in afternoon, c/w alternating  s/p RDV 2/13-2/17, s/p 1u convalescent plasma 2/13  s/p 10 days course of decadron  Chest xray with worsening opacities no pneumomediastinum  ID on board  taper o2 as tolerated  trend inflammatory markers   LE duplex (02/19): left common femoral, femoral, popliteal DVT, left gastrocnemius, right small superficial thrombosis  continue with Heparin Gtt for  now, monitor PTTs  hold off on CTA at this time given PRAVIN    PRAVIN, likely prerenal - improving  Scr 1.7 today, baseline 1.1-1.3  Lasix 40 on hold for now  on gentle hydration LR 50cc hr and monitor daily BMP  monitor I&O  renal eval Dr Grady following    Chronic Atrial Fibrillation - uncontrolled  Hypertension  HR controlled today  Cardio on board, c/w  metoprolol 50 Q12H, Amiodarone 200 Q12H, start Cardizem 60 Q8H, continue with heparin GTT  case discussed with heme/onc, given extensive DVTs while on coumadin, only start bridging to coumadin once Covid infection resolves     DM II  Hba1c 11  continue with Lantus 30 and Lispro 12 AC with ISS  monitor FS    Patient requires continuous monitoring in CEU

## 2021-02-26 NOTE — CONSULT NOTE ADULT - ASSESSMENT
PRAVIN   - prerenal    - improving with iv hydration   - good urine ouput with void   - no CKD, nl baseline Cr on 2/19/21  improving hyperkalemia  ARF due to COVID-19 PNA   - alternating HFO2 with BIPAP  valvular heart disease / afib  HTN  new DVT / possible PE  BPH    plan:    cont LR @ 50cc/hr for another 24 hours  cont holding lasix  encourage po fluid intake when off BIPAP  low K+ diet  lokelma 10g PRN for K+ > 5.5  cont flomax / avoid zaragoza  avoid CTA chest as high risk for contrast PRAVIN and already on AC for documented DVT  d/w resident

## 2021-02-26 NOTE — CONSULT NOTE ADULT - SUBJECTIVE AND OBJECTIVE BOX
NEPHROLOGY CONSULTATION NOTE    75 yo male with PMH as below, admitted for COVID PNA  with acute respiratory failure 2/15 readmisison.  Currently not itubated on high flow O2.  Hospital course complicated by persistent resp failure, DVT and PRAVIN for which renal was consulted.  Pt with normal Cr on 2/19, but persistent azotemia since then.    PAST MEDICAL & SURGICAL HISTORY:  Gastroesophageal reflux disease    Enlarged liver    BPH (benign prostatic hyperplasia)    Essential hypertension    Heart valve disorder    History of back surgery      Allergies:  No Known Allergies    Home Medications Reviewed    SOCIAL HISTORY:  Denies ETOH,Smoking,   FAMILY HISTORY:  Family history of esophageal cancer          REVIEW OF SYSTEMS:  CONSTITUTIONAL: No weakness, fevers or chills  EYES/ENT: No visual changes;  No vertigo or throat pain   NECK: No pain or stiffness  RESPIRATORY: No cough, wheezing, hemoptysis; No shortness of breath  CARDIOVASCULAR: No chest pain or palpitations.  GASTROINTESTINAL: No abdominal or epigastric pain. No nausea, vomiting, or hematemesis; No diarrhea or constipation. No melena or hematochezia.  GENITOURINARY: No dysuria, frequency, foamy urine, urinary urgency, incontinence or hematuria  NEUROLOGICAL: No numbness or weakness  SKIN: No itching, burning, rashes, or lesions   VASCULAR: No bilateral lower extremity edema.   All other review of systems is negative unless indicated above.    PHYSICAL EXAM:  Constitutional: NAD  HEENT: anicteric sclera, oropharynx clear, MMM  Neck: No JVD  Respiratory: CTAB, no wheezes, rales or rhonchi  Cardiovascular: S1, S2, RRR  Gastrointestinal: BS+, soft, NT/ND  Extremities: No cyanosis or clubbing. No peripheral edema  Neurological: A/O x 3, no focal deficits  Psychiatric: Normal mood, normal affect  : No CVA tenderness. No zaragoza.   Skin: No rashes   NEPHROLOGY CONSULTATION NOTE    75 yo male with PMH as below, admitted for COVID PNA  with acute respiratory failure 2/15 readmisison.  Currently not itubated on high flow O2 alternating with BIPAP, thus unable to give history.  Hospital course complicated by persistent resp failure, DVT and PRAVIN for which renal was consulted.  Pt with normal Cr on 2/19, but persistent azotemia since then.    PAST MEDICAL & SURGICAL HISTORY:  Gastroesophageal reflux disease    Enlarged liver    BPH (benign prostatic hyperplasia)    Essential hypertension    Heart valve disorder    History of back surgery      Allergies:  No Known Allergies    Home Medications Reviewed    SOCIAL HISTORY:  Denies ETOH,Smoking,   FAMILY HISTORY:  Family history of esophageal cancer          REVIEW OF SYSTEMS:  unobtainable on bipap    PHYSICAL EXAM:  Constitutional: NAD  HEENT: + BPAP  Neck: No JVD  Respiratory: CTAB,  Cardiovascular: S1, S2, RRR  Gastrointestinal: BS+, soft, NT/ND  Extremities: No cyanosis or clubbing. No peripheral edema  Neurological: awake and alert, no limb weakness  : No CVA tenderness. No zaragoza  Skin: No rashes    Hospital Medications:   MEDICATIONS  (STANDING):  aMIOdarone    Tablet 200 milliGRAM(s) Oral every 12 hours  diltiazem    Tablet 60 milliGRAM(s) Oral every 8 hours  diltiazem Infusion 10 mG/Hr (10 mL/Hr) IV Continuous <Continuous>  heparin  Infusion 1100 Unit(s)/Hr (11 mL/Hr) IV Continuous <Continuous>  influenza   Vaccine 0.5 milliLiter(s) IntraMuscular once  insulin glargine Injectable (LANTUS) 30 Unit(s) SubCutaneous at bedtime  insulin lispro (ADMELOG) corrective regimen sliding scale   SubCutaneous three times a day before meals  insulin lispro (ADMELOG) corrective regimen sliding scale   SubCutaneous at bedtime  insulin lispro Injectable (ADMELOG) 12 Unit(s) SubCutaneous three times a day before meals  lactated ringers. 1000 milliLiter(s) (50 mL/Hr) IV Continuous <Continuous>  metoprolol tartrate 50 milliGRAM(s) Oral two times a day  pantoprazole    Tablet 40 milliGRAM(s) Oral before breakfast  simvastatin 20 milliGRAM(s) Oral at bedtime  tamsulosin 0.4 milliGRAM(s) Oral at bedtime        VITALS:  T(F): 96.8 (02-26-21 @ 12:00), Max: 96.8 (02-26-21 @ 00:00)  HR: 90 (02-26-21 @ 12:00)  BP: 120/67 (02-26-21 @ 12:00)  RR: 25 (02-26-21 @ 12:00)  SpO2: 95% (02-26-21 @ 12:00)  Wt(kg): --    02-24 @ 07:01  -  02-25 @ 07:00  --------------------------------------------------------  IN: 1808.1 mL / OUT: 0 mL / NET: 1808.1 mL    02-25 @ 07:01  -  02-26 @ 07:00  --------------------------------------------------------  IN: 704 mL / OUT: 1000 mL / NET: -296 mL    02-26 @ 07:01  -  02-26 @ 13:11  --------------------------------------------------------  IN: 280 mL / OUT: 700 mL / NET: -420 mL          LABS:  02-26    136  |  100  |  102<HH>  ----------------------------<  198<H>  5.3<H>   |  22  |  1.7<H>    Ca    8.3<L>      26 Feb 2021 07:05  Mg     3.6     02-26    TPro  6.0  /  Alb  2.7<L>  /  TBili  0.7  /  DBili      /  AST  41  /  ALT  26  /  AlkPhos  134<H>  02-26                          16.0   20.11 )-----------( 245      ( 26 Feb 2021 07:05 )             48.1       Urine Studies:        RADIOLOGY & ADDITIONAL STUDIES:

## 2021-02-26 NOTE — PROGRESS NOTE ADULT - SUBJECTIVE AND OBJECTIVE BOX
Progress Note  This morning patient was seen and examined at bedside.    Today is hospital day 11d.  Mr. Smiley is doing fine.   He reports he had a good night sleep. He was alternating between HF 60L 100% for 2 hours and NIV for 4 hours during day since 02/24 until he had an episode of hemoptysis in AM 02/26 followed by an episode of desaturation down to 79% requiring HF 70L 100%. His appetite is reduced, but he denies nausea or vomiting. He denies any abdominal pain, diarrhea, or constipation. He had a BM was a few days ago. He is not ambulating and denies any chest pain, palpitations, or light headedness. He is voiding freely and denies urinary symptoms (dysuria, urgency, frequency, intermittence).      Vital Signs in the last 24 hours   Vitals Summary T(C): 35.9 (02-26-21 @ 08:00), Max: 36 (02-26-21 @ 00:00)  HR: 81 (02-26-21 @ 09:00) (71 - 105)  BP: 111/72 (02-26-21 @ 09:00) (101/64 - 142/81)  RR: 21 (02-26-21 @ 09:00) (18 - 33)  SpO2: 92% (02-26-21 @ 09:00) (67% - 99%)  Vent Data   Intake/ Output   02-25-21 @ 07:01  -  02-26-21 @ 07:00  --------------------------------------------------------  IN: 704 mL / OUT: 1000 mL / NET: -296 mL    02-26-21 @ 07:01  -  02-26-21 @ 10:22  --------------------------------------------------------  IN: 0 mL / OUT: 350 mL / NET: -350 mL      Physical Exam  * General Appearance: Alert, cooperative, interactive, well-groomed, oriented to time, place, and person, in no acute distress  * Head: Normocephalic, without obvious abnormality, atraumatic  * Lungs: Respirations unlabored, Good bilateral air entry, normal breath sounds (Clear to auscultation bilaterally, no audible wheezes, crackles, or rhonchi)  * Chest Wall: No tenderness or deformity  * Heart: Regular Rate and Rhythm, normal S1 and S2, no audible murmur, rub, or gallop  * Abdomen: Symmetric, non-distended, no scar, soft, non-tender, bowel sounds active all four quadrants, no masses, no organomegaly (no hepatosplenomegaly)  * Extremities: Extremities normal, atraumatic, no cyanosis, no lower extremity pitting edema bilaterally, adequate dorsalis pedis pulses  * Pulses: 2+ and symmetric all extremities  * Skin: Skin color, texture, turgor normal, no rashes or lesions  * Lymph nodes: Cervical, supraclavicular, and axillary nodes normal  * Neurologic: CNII-XII intact, normal strength, sensation and reflexes  throughout      Investigations   Laboratory Workup  - CBC:                        16.0   20.11 )-----------( 245      ( 26 Feb 2021 07:05 )             48.1     - Chemistry:  02-26    136  |  100  |  102<HH>  ----------------------------<  198<H>  5.3<H>   |  22  |  1.7<H>    Ca    8.3<L>      26 Feb 2021 07:05  Mg     3.6     02-26    TPro  6.0  /  Alb  2.7<L>  /  TBili  0.7  /  DBili  x   /  AST  41  /  ALT  26  /  AlkPhos  134<H>  02-26    - Coagulation Studies:  PT/INR - ( 26 Feb 2021 07:05 )   PT: 14.50 sec;   INR: 1.26 ratio         PTT - ( 26 Feb 2021 07:05 )  PTT:54.6 sec  - ABG:    - Cardiac Markers:      Microbiological Workup  Culture - Blood (collected 24 Feb 2021 12:14)  Source: .Blood None  Preliminary Report (25 Feb 2021 18:02):    No growth to date.          Current Medications  Standing Medications  aMIOdarone    Tablet 200 milliGRAM(s) Oral every 12 hours  diltiazem    Tablet 60 milliGRAM(s) Oral every 8 hours  diltiazem Infusion 10 mG/Hr (10 mL/Hr) IV Continuous <Continuous>  heparin  Infusion 1100 Unit(s)/Hr (11 mL/Hr) IV Continuous <Continuous>  influenza   Vaccine 0.5 milliLiter(s) IntraMuscular once  insulin glargine Injectable (LANTUS) 30 Unit(s) SubCutaneous at bedtime  insulin lispro (ADMELOG) corrective regimen sliding scale   SubCutaneous three times a day before meals  insulin lispro (ADMELOG) corrective regimen sliding scale   SubCutaneous at bedtime  insulin lispro Injectable (ADMELOG) 12 Unit(s) SubCutaneous three times a day before meals  lactated ringers. 1000 milliLiter(s) (50 mL/Hr) IV Continuous <Continuous>  metoprolol tartrate 50 milliGRAM(s) Oral two times a day  pantoprazole    Tablet 40 milliGRAM(s) Oral before breakfast  simvastatin 20 milliGRAM(s) Oral at bedtime  tamsulosin 0.4 milliGRAM(s) Oral at bedtime    PRN Medications  acetaminophen   Tablet .. 650 milliGRAM(s) Oral every 4 hours PRN Temp greater or equal to 38.5C (101.3F)  sodium chloride 0.65% Nasal 1 Spray(s) Both Nostrils two times a day PRN Nasal Congestion    Singles Doses Administered  (Floorstock) 1 each &lt;see task&gt; GiveOnce  (Floorstock) 1 each &lt;see task&gt; GiveOnce  (Floorstock) 1 each &lt;see task&gt; GiveOnce  aMIOdarone Infusion 0.501 mG/Min IV Continuous <Continuous>  aMIOdarone IVPB 150 milliGRAM(s) IV Intermittent once  dexAMETHasone  Injectable 6 milliGRAM(s) IV Push daily  digoxin  Injectable 0.25 milliGRAM(s) IV Push once  diltiazem    milliGRAM(s) Oral once  diltiazem Injectable 24 milliGRAM(s) IV Push once  diltiazem Injectable 10 milliGRAM(s) IV Push once PRN  diltiazem Injectable 10 milliGRAM(s) IV Push once  diltiazem Injectable 10 milliGRAM(s) IV Push once  furosemide   Injectable 40 milliGRAM(s) IV Push once  furosemide   Injectable 40 milliGRAM(s) IV Push once  insulin lispro Injectable (ADMELOG) 6 Unit(s) SubCutaneous once  insulin lispro Injectable (ADMELOG). 12 Unit(s) SubCutaneous once  metoprolol tartrate Injectable 5 milliGRAM(s) IV Push once  phytonadione  IVPB 2.5 milliGRAM(s) IV Intermittent once  phytonadione  IVPB 5 milliGRAM(s) IV Intermittent once  remdesivir  IVPB 100 milliGRAM(s) IV Intermittent every 24 hours  sodium zirconium cyclosilicate 5 Gram(s) Oral once  sodium zirconium cyclosilicate 5 Gram(s) Oral once  sodium zirconium cyclosilicate 5 Gram(s) Oral once

## 2021-02-26 NOTE — PROGRESS NOTE ADULT - ASSESSMENT
Assessment and Plan  Case of a 76 year old male patient with recent positive COVID test on 02/11 who presented with 1 week of SOB on 02/12, found to have COVID pneumonia, admitted on 02/12 for hypoxic RF secondary to COVID pneumonia with stay complicated by acute DVT and possible PE. Currently hemodynamically stable.      Acute Hypoxic Respiratory Failure Secondary to COVID Pneumonia  * Recent COVID positive test on 02/11  * SARS-COV2: positive 02/12  * Chest X Ray: 02/12 bilateral opacities  * Markers as below    - Infectious Disease and Pulmonary team are on board  - Monitor for fever: afebrile in last 24 hours. Tylenol PRN for fever  - Trend WBC: 02/22 20.46 -> 02/24 20.72 -> 02/26 20.4  - Monitor SaO2 and Oxygen Requirements: still 02/26 alternating between HF 60L FiO2 100% for 2 hours then NIV AVAPs EPAP 10 FiO2 100% for 4h. Had an episode of hemoptysis possibly from coughing out nasal blood clot s/p desaturation on HF 60L FiO2 100% and switch to HF at 70L FiO2 100%  - Follow up Chest X Ray on 02/26. Last Chest X Ray on 02/24 unchanged bilateral opacities  - Trend Inflammatory Markers:  --> D-dimer 68 02/12 -> 2959 02/22 -> 3921 02/24 -> 02/26 2766 -> FU repeat on 02/28  --> Procalcitonin 0.06 02/12 -> 0.24 (02/20) -> 0.28 02/21 -> 02/22 00.18 -> 02/24 0.12 -> 02/26 received-> FU repeat on 02/28  --> C-reactive protein 02/12 8.53 -> 02/17 5.99 -> 02/18 9.66 -> 13.5 02/21 -> 02/22 6.78 -> 02/24 2.45 -> 02/26 received -> FU repeat on 02/28  --> LDH 02/12 263 -> 442 (02/16)  --> Ferritin 02/12 982 -> 1137 (02/13) -> 1246 (02/15) -> 1231 (02/17) -> 02/22 2398 -> 02/25 2995 -> FU repeat on 02/28  - Follow up blood cultures: received on 11:00 AM 02/24  - COVID therapy:  --> S/P 1 unit of convalescent plasma 02/13  --> Completed a 5 day course of IV Remdesevir: S/P 100mg QD from 02/13-02/17  --> Completed a 10-day course of IV Dexamethasone 6mg QD from 02/15-02/24  --> Monitor POCT (details under DM section)  - Therapeutic Anticoagulation Heparin Drip (details under Acute DVT and possible PE)      Provoked DVT  Possible PE  * Duplex Venous (02/19): left common femoral, femoral, popliteal DVT, left gastrocnemius, right small superficial thrombosis  * Home med: coumadin for afib  * D-dimer as above under COVID pneumonia  * ED 02/12 INR 1.83 s/p Vitamin K x2 doses  - Heme onc on board for supra therapeutic INR in ED and presumed PE/ DIC: will consider bridging to coumadin (plus Aspirin 81mg QD per Dr Carl) once clinically improves and COVID resolves. Last coumadin dose 3mg x1 02/14.   - Continue Heparin Drip for now: last PTT 04:30 AM 54.6 so will repeat aPTT at 11:00 AM and continue at rate of 11ml/hour for now      Pre-Renal PRAVIN Secondary to reduced PO intake on NR and possibly the IV Lasix dose of 40mg on 02/20  * Possibly Secondary to reduced PO intake on NR and possibly the IV Lasix dose of 40mg on 02/20  * Baseline Cr 1.1 -> 02/20 BUN 84, Cr 2.0  * 02/21 and 02/22 Cr 2.7  - Nephrology Dr Grady consulted on 02/25 and will pass by on 02/26  - Monitor BUN and Cr: 02/25 Cr 2.2 -> 02/26 BUN 14, Cr 1.7  - Continue holding PO Lasix 40mg QD since 02/24   - Continue gentle IV fluid hydration with LR at 50mL/hour for now (started 02/24)  - Monitor I/O: 02/26 uo 1L over 24 hours per flow sheet      Chronic Atrial Fibrillation  * Rate uncontrolled possibly 2ry to ARF  * Home coumadin 3mg QD, Cardizem 240mg QD  * ECG 02/13 afib with RVR  * S/P IV Digoxin 0.25mg x2 doses on 02/22. Digoxin level 02/23: 0.3    - Cardiology Dr Carl on board: last on 02/21  - Monitor HR 02/23 max HR 124bpm in PM -> 02/26 77, 79 bpm  - Continue PO Lopressor 5mg BID (Switched from IV Lopressor 5mg Q6h on 02/25)  - Continue PO Amiodarone 200mg BID (Switched from Amio drip at 16.7ml/hour on 02/25)   - Will start PO Cardizem 60mg Q8h on 02/26 and continue Cardizem drip at 5ml/hour for now (since 02/21). Will consider discontinuing Cardizem drip on 02/27 and continuing only PO Cardizem 60mg Q8h thereafter  - If HR remains controlled with above-mentioned plan, will consider discontinuing PO Amiodarone and keeping only: PO Lopressor 5mg BID and PO Cardizem 60mg Q8h per Dr Carl  - For AC: heparin drip as above  - Keep K>4, Mg>2      Hypertension  * Home meds Losartan 50mg BID, Cardizem 240mg QD, and Lasix 40mg QD  - Monitor BP: 02/23 108/70 (84) mmHg -> 02/25 126/83 mmHg -> 02/26 117/78, 101/64 mmHg  - Holding Lasix PO 40mg QD starting 02/24 for pre-renal PRAVIN  - Continue PO Lopressor 5mg BID (Switched from IV Lopressor 5mg Q6h on 02/25)  - Continue PO Amiodarone 200mg BID (Switched from Amio drip at 16.7ml/hour on 02/25)   - Will start PO Cardizem 60mg Q8h on 02/26 and continue Cardizem drip at 5ml/hour for now (since 02/21). Will consider discontinuing Cardizem drip on 02/27 and continuing only PO Cardizem 60mg Q8h thereafter      DM II  * a1c 02/13 11  * Home metformin 500mg BID  - Monitor POCT 02/25 248, 209, 267, 287, 189  - Continue Lantus 30u, Lispro 12 u TID, and Scale B      GERD  * Home Omperazole 20mg QD  - Protonix 40mg QD PO      DL  * No lipid profile  * Home simvastatin 20mg QD  - Continue Simvastatin 20mg QD      Others  - DVT Prophylaxis: Heparin Drip as above  - GI Prophylaxis: Pantoprazole 40mg PO QD  - Diet: Dysphagia 3 Soft Thin Diet + Gelatin BID  - Code Status: Full per [facility] 02/24      Barriers to learning: NO  Discharge Planning: Patient will be discharged once stable   Plan was communicated with medical team and RN in charge      Niecy Colmenares MD  PGY - 1 Internal Medicine   [facility]

## 2021-02-26 NOTE — PROGRESS NOTE ADULT - SUBJECTIVE AND OBJECTIVE BOX
SUBJ:  Patient seen and discussed with house staff    MEDICATIONS  (STANDING):  aMIOdarone    Tablet 200 milliGRAM(s) Oral every 12 hours  diltiazem    Tablet 60 milliGRAM(s) Oral every 8 hours  diltiazem Infusion 10 mG/Hr (10 mL/Hr) IV Continuous <Continuous>  heparin  Infusion 1100 Unit(s)/Hr (11 mL/Hr) IV Continuous <Continuous>  influenza   Vaccine 0.5 milliLiter(s) IntraMuscular once  insulin glargine Injectable (LANTUS) 30 Unit(s) SubCutaneous at bedtime  insulin lispro (ADMELOG) corrective regimen sliding scale   SubCutaneous three times a day before meals  insulin lispro (ADMELOG) corrective regimen sliding scale   SubCutaneous at bedtime  insulin lispro Injectable (ADMELOG) 12 Unit(s) SubCutaneous three times a day before meals  lactated ringers. 1000 milliLiter(s) (50 mL/Hr) IV Continuous <Continuous>  metoprolol tartrate 50 milliGRAM(s) Oral two times a day  pantoprazole    Tablet 40 milliGRAM(s) Oral before breakfast  simvastatin 20 milliGRAM(s) Oral at bedtime  tamsulosin 0.4 milliGRAM(s) Oral at bedtime    MEDICATIONS  (PRN):  acetaminophen   Tablet .. 650 milliGRAM(s) Oral every 4 hours PRN Temp greater or equal to 38.5C (101.3F)  sodium chloride 0.65% Nasal 1 Spray(s) Both Nostrils two times a day PRN Nasal Congestion            Vital Signs Last 24 Hrs  T(C): 35.8 (26 Feb 2021 16:02), Max: 36 (26 Feb 2021 00:00)  T(F): 96.5 (26 Feb 2021 16:02), Max: 96.8 (26 Feb 2021 00:00)  HR: 86 (26 Feb 2021 16:02) (71 - 108)  BP: 139/82 (26 Feb 2021 16:02) (101/64 - 139/82)  BP(mean): 89 (26 Feb 2021 15:00) (83 - 102)  RR: 20 (26 Feb 2021 16:02) (20 - 33)  SpO2: 93% (26 Feb 2021 16:02) (67% - 99%)    TELEMETRY: Atrial fibrillation with controlled ventricular rate    ECG:    TTE:    LABS:                        16.0   20.11 )-----------( 245      ( 26 Feb 2021 07:05 )             48.1     02-26    136  |  100  |  102<HH>  ----------------------------<  198<H>  5.3<H>   |  22  |  1.7<H>    Ca    8.3<L>      26 Feb 2021 07:05  Mg     3.6     02-26    TPro  6.0  /  Alb  2.7<L>  /  TBili  0.7  /  DBili  x   /  AST  41  /  ALT  26  /  AlkPhos  134<H>  02-26        PT/INR - ( 26 Feb 2021 07:05 )   PT: 14.50 sec;   INR: 1.26 ratio         PTT - ( 26 Feb 2021 12:08 )  PTT:53.2 sec    I&O's Summary    25 Feb 2021 07:01  -  26 Feb 2021 07:00  --------------------------------------------------------  IN: 704 mL / OUT: 1000 mL / NET: -296 mL    26 Feb 2021 07:01  -  26 Feb 2021 19:28  --------------------------------------------------------  IN: 528 mL / OUT: 1000 mL / NET: -472 mL      BNP  RADIOLOGY & ADDITIONAL STUDIES:    IMPRESSION AND PLAN:    1) Persistent atrial fibrillation  presently rate is controlled   Continue Amiodarone to 200 mg po q12h   Continue Metoprolol 50 mg po q12h  Start cardizem 60 mg po q8h    Continue heparin drip as recommended by hematology.    2)DVT and possible PE  Continue heparin drip.  We can add ASA 81 mg po q24h since he developed DVT while on therapeutic INR.    3)Hypoxic respiratory failure COVID - 19 pneumonia on CPAP  F/U Pulmonary and critical care recommendation.

## 2021-02-26 NOTE — PROGRESS NOTE ADULT - ASSESSMENT
76 year old Male with a past medical history of HTN, HLD, BPH, Hepatomegaly, "Heart valve disorder" ?Afib on Coumadin, presented to the ER with a chief complaint of Weakness and shortness of breath x 1 week, covid + x 1 day. Patient is being admitted for increased weakness secondary to COVID infection     IMPRESSION;  Clinically no change  COVID 19 with severe illness. SpO2 < 94% on RA and need for supplemental O2. HFNC  Pt was  in the early viral replicative phase based on the timeline/onset of symptoms.   COVID-19 antibodies NG  procalcitonin 0.08  , not suggestive of a bacterial PNA.  Ferritin 1246>1231>1231  CRP 7.70 >5.9>13.5>2.45  Ddimers 197>2959 ( suggestive of a pulmonary embolus ) > 3921  Legionella NG  Worsening renal function    s/p RDV   s/p plasma  s/p Dexamethason 2/13-24    RECOMMENDATIONS;  CT chest angio  Target SpO2 92 % to 96 %  Anticoagulation as per team

## 2021-02-27 NOTE — PROGRESS NOTE ADULT - ASSESSMENT
PRAVIN   - prerenal    - improving with iv hydration   - good urine ouput with void   - no CKD, nl baseline Cr on 2/19/21  improving hyperkalemia  ARF due to COVID-19 PNA   - alternating HFO2 with BIPAP  valvular heart disease / afib  HTN  new DVT / possible PE  BPH    plan:    DC IVF  cont holding lasix  encourage po fluid intake when off BIPAP  low K+ diet  lokelma 10g x 1 dose  cont flomax / avoid zaragoza  avoid CTA chest as high risk for contrast PRAVIN and already on AC for documented DVT  d/w resident

## 2021-02-27 NOTE — PROGRESS NOTE ADULT - SUBJECTIVE AND OBJECTIVE BOX
ELIZA POWELL  76y Male    CHIEF COMPLAINT:    Patient is a 76y old  Male who presents with a chief complaint of atrial fibrillation (26 Feb 2021 19:27)      INTERVAL HPI/OVERNIGHT EVENTS:    Patient seen and examined. No acute events overnight. tolerating hfnc    ROS: All other systems are negative.    Vital Signs:    T(F): 96.5 (02-27-21 @ 07:00), Max: 97.1 (02-27-21 @ 00:19)  HR: 102 (02-27-21 @ 10:58) (65 - 102)  BP: 98/57 (02-27-21 @ 08:00) (97/58 - 139/82)  RR: 20 (02-27-21 @ 08:00) (12 - 26)  SpO2: 93% (02-27-21 @ 10:58) (91% - 96%)    26 Feb 2021 07:01  -  27 Feb 2021 07:00  --------------------------------------------------------  IN: 1465 mL / OUT: 2240 mL / NET: -775 mL    27 Feb 2021 07:01  -  27 Feb 2021 11:48  --------------------------------------------------------  IN: 205 mL / OUT: 0 mL / NET: 205 mL    POCT Blood Glucose.: 168 mg/dL (27 Feb 2021 11:40)  POCT Blood Glucose.: 122 mg/dL (27 Feb 2021 07:51)  POCT Blood Glucose.: 115 mg/dL (27 Feb 2021 06:40)  POCT Blood Glucose.: 97 mg/dL (26 Feb 2021 20:21)  POCT Blood Glucose.: 137 mg/dL (26 Feb 2021 16:20)  POCT Blood Glucose.: 230 mg/dL (26 Feb 2021 12:22)    PHYSICAL EXAM:    GENERAL:  NAD  SKIN: No rashes or lesions  HEENT: Atraumatic. Normocephalic.   NECK: Supple, No JVD.   PULMONARY: poor inspiratory effort. No wheezing. No rales  CVS: Normal S1, S2. Rate and Rhythm are regular.   ABDOMEN/GI: Soft, Nontender, Nondistended; BS present  MSK:  No edema B/L LE. No clubbing or cyanosis  NEUROLOGIC: moves all extremities  PSYCH: Alert & oriented x 3, normal affect    Consultant(s) Notes Reviewed:  [x ] YES  [ ] NO  Care Discussed with Consultants/Other Providers [ x] YES  [ ] NO    LABS:                        15.8   20.76 )-----------( 239      ( 27 Feb 2021 08:06 )             46.7     136  |  101  |  85<HH>  ----------------------------<  126<H>  5.4<H>   |  23  |  1.5    Ca    7.8<L>      27 Feb 2021 08:06  Mg     3.3     02-27    TPro  5.5<L>  /  Alb  2.6<L>  /  TBili  0.8  /  DBili  x   /  AST  44<H>  /  ALT  29  /  AlkPhos  119<H>  02-27    PT/INR - ( 27 Feb 2021 08:06 )   PT: 15.40 sec;   INR: 1.34 ratio       PTT - ( 27 Feb 2021 08:06 )  PTT:70.3 sec  Serum Pro-Brain Natriuretic Peptide: 1086 pg/mL (02-20-21 @ 12:27)    Culture - Blood (collected 24 Feb 2021 12:14)  Source: .Blood None  Preliminary Report (25 Feb 2021 18:02):    No growth to date.    RADIOLOGY & ADDITIONAL TESTS:    Imaging or report Personally Reviewed:  [x] YES  [ ] NO  EKG reviewed: [x] YES  [ ] NO    Medications:  Standing  aMIOdarone    Tablet 200 milliGRAM(s) Oral every 12 hours  diltiazem    Tablet 60 milliGRAM(s) Oral every 8 hours  heparin  Infusion 1100 Unit(s)/Hr IV Continuous <Continuous>  influenza   Vaccine 0.5 milliLiter(s) IntraMuscular once  insulin glargine Injectable (LANTUS) 30 Unit(s) SubCutaneous at bedtime  insulin lispro (ADMELOG) corrective regimen sliding scale   SubCutaneous three times a day before meals  insulin lispro (ADMELOG) corrective regimen sliding scale   SubCutaneous at bedtime  insulin lispro Injectable (ADMELOG) 12 Unit(s) SubCutaneous three times a day before meals  metoprolol tartrate 50 milliGRAM(s) Oral two times a day  pantoprazole    Tablet 40 milliGRAM(s) Oral before breakfast  simvastatin 20 milliGRAM(s) Oral at bedtime  tamsulosin 0.4 milliGRAM(s) Oral at bedtime    PRN Meds  acetaminophen   Tablet .. 650 milliGRAM(s) Oral every 4 hours PRN  sodium chloride 0.65% Nasal 1 Spray(s) Both Nostrils two times a day PRN

## 2021-02-27 NOTE — PROGRESS NOTE ADULT - ASSESSMENT
IMPRESSION:  Acute hypoxemic respiratory failure  COVID-19 PNA  HO SMILEY, noncompliant with CPAP  HO Afib, on coumadin at home  DVT ?? PE   PRAVIN       PLAN:    CNS: Avoid CNS depressants.     HEENT: Oral care    PULMONARY:  HOB @ 45 degrees.  Aspiration precautions.  Alternate HF with NIV.  NIV during sleep.         CARDIOVASCULAR: Avoid volume overload.  Rate control.      GI: GI prophylaxis.  Feeding as tolerated when off AVAPS.  Bowel regimen     RENAL:  Follow up lytes.  Correct as needed.     INFECTIOUS: DISEASE: FU with ID.       HEMATOLOGICAL:  DVT therapy.  Heparin drip.  Monitor CBC and PTT     ENDOCRINE:  Follow up FS.  Insulin protocol if needed.    MUSCULOSKELETAL: bedrest    Overall poor prognosis    CEU

## 2021-02-27 NOTE — PROGRESS NOTE ADULT - SUBJECTIVE AND OBJECTIVE BOX
[place] NEPHROLOGY FOLLOW UP NOTE  --------------------------------------------------------------------------------  24 hour events/subjective: Patient examined. Tachypneic but comfortable.    PAST HISTORY  --------------------------------------------------------------------------------  No significant changes to PMH, PSH, FHx, SHx, unless otherwise noted    ALLERGIES & MEDICATIONS  --------------------------------------------------------------------------------  Allergies    No Known Allergies    Standing Inpatient Medications  aMIOdarone    Tablet 200 milliGRAM(s) Oral every 12 hours  diltiazem    Tablet 60 milliGRAM(s) Oral every 8 hours  heparin  Infusion 1100 Unit(s)/Hr IV Continuous <Continuous>  influenza   Vaccine 0.5 milliLiter(s) IntraMuscular once  insulin glargine Injectable (LANTUS) 30 Unit(s) SubCutaneous at bedtime  insulin lispro (ADMELOG) corrective regimen sliding scale   SubCutaneous three times a day before meals  insulin lispro (ADMELOG) corrective regimen sliding scale   SubCutaneous at bedtime  insulin lispro Injectable (ADMELOG) 12 Unit(s) SubCutaneous three times a day before meals  metoprolol tartrate 50 milliGRAM(s) Oral two times a day  pantoprazole    Tablet 40 milliGRAM(s) Oral before breakfast  simvastatin 20 milliGRAM(s) Oral at bedtime  tamsulosin 0.4 milliGRAM(s) Oral at bedtime    PRN Inpatient Medications  acetaminophen   Tablet .. 650 milliGRAM(s) Oral every 4 hours PRN  sodium chloride 0.65% Nasal 1 Spray(s) Both Nostrils two times a day PRN      VITALS/PHYSICAL EXAM  --------------------------------------------------------------------------------  T(C): 36.2 (02-27-21 @ 12:00), Max: 36.2 (02-27-21 @ 00:19)  HR: 94 (02-27-21 @ 12:00) (65 - 102)  BP: 108/67 (02-27-21 @ 12:00) (97/58 - 139/82)  RR: 20 (02-27-21 @ 12:00) (12 - 26)  SpO2: 93% (02-27-21 @ 12:00) (91% - 96%)    02-26-21 @ 07:01  -  02-27-21 @ 07:00  --------------------------------------------------------  IN: 1465 mL / OUT: 2240 mL / NET: -775 mL    02-27-21 @ 07:01  -  02-27-21 @ 12:18  --------------------------------------------------------  IN: 205 mL / OUT: 0 mL / NET: 205 mL      Physical Exam:  	Gen: NAD  	Pulm:  B/L rales  	CV: RRR, S1S2  	Abd: +BS, soft, nontender/nondistended  	: No suprapubic tenderness  	LE: Warm,  edema    LABS/STUDIES  --------------------------------------------------------------------------------              15.8   20.76 >-----------<  239      [02-27-21 @ 08:06]              46.7     136  |  101  |  85  ----------------------------<  126      [02-27-21 @ 08:06]  5.4   |  23  |  1.5        Ca     7.8     [02-27-21 @ 08:06]      Mg     3.3     [02-27-21 @ 08:06]    TPro  5.5  /  Alb  2.6  /  TBili  0.8  /  DBili  x   /  AST  44  /  ALT  29  /  AlkPhos  119  [02-27-21 @ 08:06]    PT/INR: PT 15.40, INR 1.34       [02-27-21 @ 08:06]  PTT: 70.3       [02-27-21 @ 08:06]    Creatinine Trend:  SCr 1.5 [02-27 @ 08:06]  SCr 1.7 [02-26 @ 07:05]  SCr 2.2 [02-25 @ 08:14]  SCr 2.2 [02-24 @ 08:12]  SCr 2.3 [02-23 @ 08:07]    Ferritin 2995      [02-25-21 @ 08:14]

## 2021-02-27 NOTE — PROGRESS NOTE ADULT - ASSESSMENT
75 yo M with PMx Chronic Afib on Coumadin, HTN, DLD, DM II,  with recent positive COVID test on 02/11 who presented with 1 week of SOB on 02/12, transferred to CEU due to acute hypoxic respiratory failure due to COVID PNA, complicated by DVT and possible PE    Acute Hypoxemic Respiratory Failure Secondary to COVID Pneumonia  Provoked DVT/Possible PE  Tolerated HFNC, eating breakfast  alternate with bipap  s/p RDV 2/13-2/17, s/p 1u convalescent plasma 2/13  s/p 10 days course of decadron  ID on board  taper o2 as tolerated  trend inflammatory markers   LE duplex (02/19): left common femoral, femoral, popliteal DVT, left gastrocnemius, right small superficial thrombosis  continue with Heparin Gtt for  now, monitor PTTs  hold off on CTA at this time given PRAVIN    PRAVIN, likely prerenal - improving  Scr 1.5 today, baseline 1.1-1.3  Lasix 40 on hold for now  dc LR 50cc hr and monitor daily BMP  monitor I&O  renal eval Dr Grady following    Chronic Atrial Fibrillation   Hypertension  HR controlled today  Cardio on board, c/w  metoprolol 50 Q12H, Amiodarone 200 Q12H, start Cardizem 60 Q8H, continue with heparin GTT  case discussed with heme/onc, given extensive DVTs while on coumadin, only start bridging to coumadin once Covid infection resolves     DM II  Hba1c 11  continue with Lantus 30 and Lispro 12 AC with ISS  monitor FS    Patient requires continuous monitoring in CEU     75 yo M with PMx Chronic Afib on Coumadin, HTN, DLD, DM II,  with recent positive COVID test on 02/11 who presented with 1 week of SOB on 02/12, transferred to CEU due to acute hypoxic respiratory failure due to COVID PNA, complicated by DVT and possible PE    Acute Hypoxemic Respiratory Failure Secondary to COVID Pneumonia  Provoked DVT/Possible PE  Tolerated HFNC, eating breakfast  alternate with bipap  s/p RDV 2/13-2/17, s/p 1u convalescent plasma 2/13  s/p 10 days course of decadron  ID on board  taper o2 as tolerated  trend inflammatory markers   LE duplex (02/19): left common femoral, femoral, popliteal DVT, left gastrocnemius, right small superficial thrombosis  continue with Heparin Gtt for  now, monitor PTTs  hold off on CTA at this time given PRAIVN    PRAVIN, likely prerenal - improving  Scr 1.5 today, baseline 1.1-1.3  Lasix on hold for now  dc LR and monitor daily BMP  monitor I&O  renal eval Dr Grady following    Chronic Atrial Fibrillation   Hypertension  HR controlled today  Cardio on board, c/w  metoprolol 50 Q12H, Amiodarone 200 Q12H, start Cardizem 60 Q8H, continue with heparin GTT  case discussed with heme/onc, given extensive DVTs while on coumadin, only start bridging to coumadin once Covid infection resolves     DM II  Hba1c 11  continue with Lantus 30 and Lispro 12 AC with ISS  monitor FS    Patient requires continuous monitoring in CEU

## 2021-02-27 NOTE — PROGRESS NOTE ADULT - SUBJECTIVE AND OBJECTIVE BOX
Patient is a 76y old  Male who presents with a chief complaint of atrial fibrillation (26 Feb 2021 19:27)        Over Night Events:  On HFNCO2.          ROS:     All ROS are negative except HPI         PHYSICAL EXAM    ICU Vital Signs Last 24 Hrs  T(C): 35.8 (27 Feb 2021 16:00), Max: 36.2 (27 Feb 2021 00:19)  T(F): 96.5 (27 Feb 2021 16:00), Max: 97.1 (27 Feb 2021 00:19)  HR: 84 (27 Feb 2021 16:00) (65 - 102)  BP: 123/84 (27 Feb 2021 16:00) (97/58 - 123/84)  BP(mean): 100 (27 Feb 2021 16:00) (69 - 100)  ABP: --  ABP(mean): --  RR: 30 (27 Feb 2021 16:00) (12 - 30)  SpO2: 100% (27 Feb 2021 16:00) (91% - 100%)      CONSTITUTIONAL:  Well nourished.  NAD    ENT:   Airway patent,   Mouth with normal mucosa.   No thrush    EYES:   Pupils equal,   Round and reactive to light.    CARDIAC:   Normal rate,   Irregular rhythm.    edema      Vascular:  Normal systolic impulse  No Carotid bruits    RESPIRATORY:   No wheezing  Bilateral BS  Normal chest expansion  Not tachypneic,  No use of accessory muscles    GASTROINTESTINAL:  Abdomen soft,   Non-tender,   No guarding,   + BS    MUSCULOSKELETAL:   Range of motion is not limited,  No clubbing, cyanosis    NEUROLOGICAL:   Alert and oriented   No motor  deficits.    SKIN:   Skin normal color for race,   Warm and dry and intact.   No evidence of rash.    PSYCHIATRIC:   Normal mood and affect.   No apparent risk to self or others.    HEMATOLOGICAL:  No cervical  lymphadenopathy.  no inguinal lymphadenopathy      02-26-21 @ 07:01  -  02-27-21 @ 07:00  --------------------------------------------------------  IN:    Diltiazem: 45 mL    Heparin: 209 mL    Heparin: 11 mL    Lactated Ringers: 1000 mL    Oral Fluid: 200 mL  Total IN: 1465 mL    OUT:    Voided (mL): 2240 mL  Total OUT: 2240 mL    Total NET: -775 mL      02-27-21 @ 07:01  -  02-27-21 @ 20:39  --------------------------------------------------------  IN:    Heparin: 121 mL    Lactated Ringers: 150 mL  Total IN: 271 mL    OUT:    Voided (mL): 250 mL  Total OUT: 250 mL    Total NET: 21 mL          LABS:                            15.8   20.76 )-----------( 239      ( 27 Feb 2021 08:06 )             46.7                                               02-27    136  |  101  |  85<HH>  ----------------------------<  126<H>  5.4<H>   |  23  |  1.5    27 Feb 2021 08:06    136    |  101    |  85<HH>  ----------------------------<  126<H>  5.4<H>   |  23     |  1.5    26 Feb 2021 07:05    136    |  100    |  102<HH>  ----------------------------<  198<H>  5.3<H>   |  22     |  1.7<H>    Ca    7.8<L>      27 Feb 2021 08:06  Ca    8.3<L>      26 Feb 2021 07:05  Mg     3.3<HH>     27 Feb 2021 08:06  Mg     3.6<HH>     26 Feb 2021 07:05    TPro  5.5<L>  /  Alb  2.6<L>  /  TBili  0.8    /  DBili  x      /  AST  44<H>  /  ALT  29     /  AlkPhos  119<H>  27 Feb 2021 08:06  TPro  6.0    /  Alb  2.7<L>  /  TBili  0.7    /  DBili  x      /  AST  41     /  ALT  26     /  AlkPhos  134<H>  26 Feb 2021 07:05      Ca    7.8<L>      27 Feb 2021 08:06  Mg     3.3     02-27    TPro  5.5<L>  /  Alb  2.6<L>  /  TBili  0.8  /  DBili  x   /  AST  44<H>  /  ALT  29  /  AlkPhos  119<H>  02-27      PT/INR - ( 27 Feb 2021 08:06 )   PT: 15.40 sec;   INR: 1.34 ratio         PTT - ( 27 Feb 2021 08:06 )  PTT:70.3 sec                                                                                     LIVER FUNCTIONS - ( 27 Feb 2021 08:06 )  Alb: 2.6 g/dL / Pro: 5.5 g/dL / ALK PHOS: 119 U/L / ALT: 29 U/L / AST: 44 U/L / GGT: x                                                                                                                                       MEDICATIONS  (STANDING):  aMIOdarone    Tablet 200 milliGRAM(s) Oral every 12 hours  diltiazem    Tablet 60 milliGRAM(s) Oral every 8 hours  heparin  Infusion 1100 Unit(s)/Hr (11 mL/Hr) IV Continuous <Continuous>  influenza   Vaccine 0.5 milliLiter(s) IntraMuscular once  insulin glargine Injectable (LANTUS) 30 Unit(s) SubCutaneous at bedtime  insulin lispro (ADMELOG) corrective regimen sliding scale   SubCutaneous three times a day before meals  insulin lispro (ADMELOG) corrective regimen sliding scale   SubCutaneous at bedtime  insulin lispro Injectable (ADMELOG) 12 Unit(s) SubCutaneous three times a day before meals  metoprolol tartrate 50 milliGRAM(s) Oral two times a day  pantoprazole    Tablet 40 milliGRAM(s) Oral before breakfast  simvastatin 20 milliGRAM(s) Oral at bedtime  tamsulosin 0.4 milliGRAM(s) Oral at bedtime    MEDICATIONS  (PRN):  acetaminophen   Tablet .. 650 milliGRAM(s) Oral every 4 hours PRN Temp greater or equal to 38.5C (101.3F)  sodium chloride 0.65% Nasal 1 Spray(s) Both Nostrils two times a day PRN Nasal Congestion      New X-rays reviewed:                                                                                  ECHO    CXR interpreted by me:

## 2021-02-28 NOTE — PROGRESS NOTE ADULT - ASSESSMENT
Assessment and Plan  Case of a 76 year old male patient with recent positive COVID test on 02/11 who presented with 1 week of SOB on 02/12, found to have COVID pneumonia, admitted on 02/12 for hypoxic RF secondary to COVID pneumonia with stay complicated by acute DVT and possible PE. Currently hemodynamically stable.      Acute Hypoxic Respiratory Failure Secondary to COVID Pneumonia  * Recent COVID positive test on 02/11  * SARS-COV2: positive 02/12  * Chest X Ray: 02/12 bilateral opacities  * Markers as below    - Infectious Disease and Pulmonary team are on board  - Monitor for fever: afebrile in last 24 hours. Tylenol PRN for fever  - Trend WBC: 02/22 20.46 -> 02/24 20.72 -> 02/26 20.4 -> 20.76 02/27  - Monitor SaO2 and Oxygen Requirements: 02/28 still alternating between HF 70L FiO2 100% for 2 hours with S 93-95% then NIV AVAPs EPAP 10 FiO2 100% for 4h and overnight  - Follow up Chest X Ray on 02/28. Last Chest X Ray on 02/27 unchanged bilateral opacities  - Trend Inflammatory Markers:  --> D-dimer 68 02/12 -> 2959 02/22 -> 3921 02/24 -> 02/26 2766 -> FU repeat on 02/28  --> Procalcitonin 0.06 02/12 -> 0.24 (02/20) -> 0.28 02/21 -> 02/22 00.18 -> 02/24 0.12 -> 02/26 0.08-> FU repeat on 02/28  --> C-reactive protein 02/12 8.53 -> 02/17 5.99 -> 02/18 9.66 -> 13.5 02/21 -> 02/22 6.78 -> 02/24 2.45 -> 02/26 0.82-> FU repeat on 02/28  --> LDH 02/12 263 -> 442 (02/16)  --> Ferritin 02/12 982 -> 1137 (02/13) -> 1246 (02/15) -> 1231 (02/17) -> 02/22 2398 -> 02/25 2995 -> FU repeat on 02/28  - Follow up blood cultures: received on 11:00 AM 02/24  - COVID therapy:  --> S/P 1 unit of convalescent plasma 02/13  --> Completed a 5 day course of IV Remdesevir: S/P 100mg QD from 02/13-02/17  --> Completed a 10-day course of IV Dexamethasone 6mg QD from 02/15-02/24  --> Monitor POCT (details under DM section)  - Therapeutic Anticoagulation Heparin Drip (details under Acute DVT and possible PE)      Provoked DVT  Possible PE  * Duplex Venous (02/19): left common femoral, femoral, popliteal DVT, left gastrocnemius, right small superficial thrombosis  * Home med: coumadin for afib  * D-dimer as above under COVID pneumonia  * ED 02/12 INR 1.83 s/p Vitamin K x2 doses  - Heme onc on board for supra therapeutic INR in ED and presumed PE/ DIC: will consider bridging to coumadin (plus Aspirin 81mg QD per Dr Carl) once clinically improves and COVID resolves. Last coumadin dose 3mg x1 02/14.   - Continue Heparin Drip for now: currently at rate of 11ml/hour. FU aPTT 02/28      Pre-Renal PRAVIN Secondary to reduced PO intake on NR and possibly the IV Lasix dose of 40mg on 02/20  * Possibly Secondary to reduced PO intake on NR and possibly the IV Lasix dose of 40mg on 02/20  * Baseline Cr 1.1 -> 02/20 BUN 84, Cr 2.0  * 02/21 and 02/22 Cr 2.7  - Nephrology Dr Grady consulted on 02/25  - Monitor BUN and Cr: 02/25 Cr 2.2 -> 02/26 BUN 14, Cr 1.7 -> 02/27 Cr 1.5  - Continue holding PO Lasix 40mg QD since 02/24   - Discontinued gentle IV fluid hydration with LR at 50mL/hour that 02/24- 02/27  - Monitor I/O      Chronic Atrial Fibrillation  * Rate uncontrolled possibly 2ry to ARF  * Home coumadin 3mg QD, Cardizem 240mg QD  * ECG 02/13 afib with RVR  * S/P IV Digoxin 0.25mg x2 doses on 02/22. Digoxin level 02/23: 0.3    - Cardiology Dr Carl on board: last on 02/21  - Monitor HR 02/28 121, 118, 79bpm  - Continue PO Lopressor 5mg BID (Switched from IV Lopressor 5mg Q6h on 02/25)  - Continue PO Amiodarone 200mg BID (Switched from Amio drip at 16.7ml/hour on 02/25)   - Started PO Cardizem 60mg Q8h on 02/26 and discontinued Cardizem drip on 02/27  - If HR remains controlled with above-mentioned plan, will consider discontinuing PO Amiodarone and keeping only: PO Lopressor 5mg BID and PO Cardizem 60mg Q8h per Dr Carl  - For AC: heparin drip as above  - Keep K>4, Mg>2      Hypertension  * Home meds Losartan 50mg BID, Cardizem 240mg QD, and Lasix 40mg QD  - Monitor BP: 02/28 100/65, 118/62 mmHg  - Holding Lasix PO 40mg QD starting 02/24 for pre-renal PRAVIN  - Continue PO Lopressor 5mg BID (Switched from IV Lopressor 5mg Q6h on 02/25)  - Continue PO Amiodarone 200mg BID (Switched from Amio drip at 16.7ml/hour on 02/25)   - Started PO Cardizem 60mg Q8h on 02/26 and discontinued Cardizem drip on 02/27      DM II  * a1c 02/13 11  * Home metformin 500mg BID  - Monitor POCT 02/27 115, 122, 168, 120  - Continue Lantus 30u, Lispro 12 u TID, and Scale B      GERD  * Home Omperazole 20mg QD  - Protonix 40mg QD PO      DL  * No lipid profile  * Home simvastatin 20mg QD  - Continue Simvastatin 20mg QD      Others  - DVT Prophylaxis: Heparin Drip as above  - GI Prophylaxis: Pantoprazole 40mg PO QD  - Diet: Dysphagia 3 Soft Thin Diet + Gelatin BID  - Code Status: Full per [facility] 02/24      Barriers to learning: NO  Discharge Planning: Patient will be discharged once stable   Plan was communicated with medical team and RN in charge      Niecy Colmenares MD  PGY - 1 Internal Medicine   [facility]

## 2021-02-28 NOTE — PROGRESS NOTE ADULT - ASSESSMENT
PRAVIN   - prerenal    - improving with iv hydration   - good urine ouput with void   - no CKD, nl baseline Cr on 2/19/21  improving hyperkalemia  ARF due to COVID-19 PNA   - alternating HFO2 with BIPAP  valvular heart disease / afib  HTN  new DVT / possible PE  BPH    plan:    Monitor off IVF  cont holding lasix  encourage po fluid intake when off BIPAP  low K+ diet  lokelma 10g x 1 dose  cont flomax / avoid zaragoza  avoid CTA chest as high risk for contrast PRAVIN and already on AC for documented DVT

## 2021-02-28 NOTE — PROGRESS NOTE ADULT - SUBJECTIVE AND OBJECTIVE BOX
ELIZA POWELL  76y Male    CHIEF COMPLAINT:    Patient is a 76y old  Male who presents with a chief complaint of atrial fibrillation (26 Feb 2021 19:27)      INTERVAL HPI/OVERNIGHT EVENTS:    Patient seen and examined. No acute events overnight.     ROS: All other systems are negative.    Vital Signs:    T(F): 97.1 (02-28-21 @ 07:42), Max: 97.1 (02-28-21 @ 07:42)  HR: 103 (02-28-21 @ 07:42) (79 - 121)  BP: 115/75 (02-28-21 @ 07:42) (100/65 - 123/84)  RR: 22 (02-28-21 @ 07:42) (19 - 30)  SpO2: 100% (02-28-21 @ 07:42) (100% - 100%)    27 Feb 2021 07:01  -  28 Feb 2021 07:00  --------------------------------------------------------  IN: 304 mL / OUT: 250 mL / NET: 54 mL    28 Feb 2021 07:01  -  28 Feb 2021 12:18  --------------------------------------------------------  IN: 22 mL / OUT: 0 mL / NET: 22 mL    POCT Blood Glucose.: 137 mg/dL (28 Feb 2021 11:40)  POCT Blood Glucose.: 123 mg/dL (28 Feb 2021 07:41)  POCT Blood Glucose.: 121 mg/dL (28 Feb 2021 06:31)  POCT Blood Glucose.: 132 mg/dL (27 Feb 2021 22:10)  POCT Blood Glucose.: 120 mg/dL (27 Feb 2021 16:26)    PHYSICAL EXAM:    GENERAL:  NAD  SKIN: No rashes or lesions  HEENT: Atraumatic. Normocephalic.   NECK: Supple, No JVD.    PULMONARY:coarse breath sounds. No wheezing. No rales  CVS: Normal S1, S2. Rate and Rhythm are regular.    ABDOMEN/GI: Soft, Nontender, Nondistended; BS present  MSK:  No edema B/L LE. no clubbing or cyanosis  NEUROLOGIC: moves all extremities  PSYCH: Alert & oriented x 3, normal affect    Consultant(s) Notes Reviewed:  [x ] YES  [ ] NO  Care Discussed with Consultants/Other Providers [ x] YES  [ ] NO    LABS:                        15.5   19.67 )-----------( 228      ( 28 Feb 2021 08:15 )             46.4     138  |  103  |  76<HH>  ----------------------------<  129<H>  5.6<H>   |  23  |  1.6<H>    Ca    8.2<L>      28 Feb 2021 08:15  Mg     3.3     02-28    TPro  5.7<L>  /  Alb  2.3<L>  /  TBili  0.6  /  DBili  x   /  AST  40  /  ALT  25  /  AlkPhos  110  02-28    PT/INR - ( 28 Feb 2021 08:15 )   PT: 13.80 sec;   INR: 1.20 ratio       PTT - ( 28 Feb 2021 08:15 )  PTT:70.7 sec    RADIOLOGY & ADDITIONAL TESTS:  Imaging or report Personally Reviewed:  [x] YES  [ ] NO  EKG reviewed: [x] YES  [ ] NO    Medications:  Standing  aMIOdarone    Tablet 200 milliGRAM(s) Oral every 12 hours  diltiazem    Tablet 60 milliGRAM(s) Oral every 8 hours  heparin  Infusion 1100 Unit(s)/Hr IV Continuous <Continuous>  influenza   Vaccine 0.5 milliLiter(s) IntraMuscular once  insulin glargine Injectable (LANTUS) 30 Unit(s) SubCutaneous at bedtime  insulin lispro (ADMELOG) corrective regimen sliding scale   SubCutaneous three times a day before meals  insulin lispro (ADMELOG) corrective regimen sliding scale   SubCutaneous at bedtime  insulin lispro Injectable (ADMELOG) 12 Unit(s) SubCutaneous three times a day before meals  metoprolol tartrate 50 milliGRAM(s) Oral two times a day  pantoprazole    Tablet 40 milliGRAM(s) Oral before breakfast  simvastatin 20 milliGRAM(s) Oral at bedtime  tamsulosin 0.4 milliGRAM(s) Oral at bedtime    PRN Meds  acetaminophen   Tablet .. 650 milliGRAM(s) Oral every 4 hours PRN  sodium chloride 0.65% Nasal 1 Spray(s) Both Nostrils two times a day PRN

## 2021-02-28 NOTE — PROGRESS NOTE ADULT - SUBJECTIVE AND OBJECTIVE BOX
[place] [facility] FOLLOW UP NOTE  --------------------------------------------------------------------------------  24 hour events/subjective: Patient examined.    PAST HISTORY  --------------------------------------------------------------------------------  No significant changes to PMH, PSH, FHx, SHx, unless otherwise noted    ALLERGIES & MEDICATIONS  --------------------------------------------------------------------------------  Allergies    No Known Allergies    Intolerances      Standing Inpatient Medications  aMIOdarone    Tablet 200 milliGRAM(s) Oral every 12 hours  diltiazem    Tablet 60 milliGRAM(s) Oral every 8 hours  heparin  Infusion 1100 Unit(s)/Hr IV Continuous <Continuous>  influenza   Vaccine 0.5 milliLiter(s) IntraMuscular once  insulin glargine Injectable (LANTUS) 30 Unit(s) SubCutaneous at bedtime  insulin lispro (ADMELOG) corrective regimen sliding scale   SubCutaneous three times a day before meals  insulin lispro (ADMELOG) corrective regimen sliding scale   SubCutaneous at bedtime  insulin lispro Injectable (ADMELOG) 12 Unit(s) SubCutaneous three times a day before meals  metoprolol tartrate 50 milliGRAM(s) Oral two times a day  pantoprazole    Tablet 40 milliGRAM(s) Oral before breakfast  simvastatin 20 milliGRAM(s) Oral at bedtime  tamsulosin 0.4 milliGRAM(s) Oral at bedtime    PRN Inpatient Medications  acetaminophen   Tablet .. 650 milliGRAM(s) Oral every 4 hours PRN  sodium chloride 0.65% Nasal 1 Spray(s) Both Nostrils two times a day PRN      VITALS/PHYSICAL EXAM  --------------------------------------------------------------------------------  T(C): 35.8 (02-28-21 @ 11:50), Max: 36.2 (02-28-21 @ 07:42)  HR: 107 (02-28-21 @ 11:50) (79 - 121)  BP: 117/81 (02-28-21 @ 11:50) (100/65 - 123/84)  RR: 22 (02-28-21 @ 11:50) (19 - 30)  SpO2: 90% (02-28-21 @ 11:50) (90% - 100%)  Wt(kg): --        02-27-21 @ 07:01  -  02-28-21 @ 07:00  --------------------------------------------------------  IN: 304 mL / OUT: 250 mL / NET: 54 mL    02-28-21 @ 07:01  -  02-28-21 @ 13:49  --------------------------------------------------------  IN: 77 mL / OUT: 125 mL / NET: -48 mL      Physical Exam:  	Gen: NAD  	Pulm:  B/L rales  	CV: RRR, S1S2  	Abd: +BS, soft, nontender/nondistended  	: No suprapubic tenderness  	LE: Warm,  edema  LABS/STUDIES  --------------------------------------------------------------------------------              15.5   19.67 >-----------<  228      [02-28-21 @ 08:15]              46.4     138  |  103  |  76  ----------------------------<  129      [02-28-21 @ 08:15]  5.6   |  23  |  1.6        Ca     8.2     [02-28-21 @ 08:15]      Mg     3.3     [02-28-21 @ 08:15]    TPro  5.7  /  Alb  2.3  /  TBili  0.6  /  DBili  x   /  AST  40  /  ALT  25  /  AlkPhos  110  [02-28-21 @ 08:15]    PT/INR: PT 13.80, INR 1.20       [02-28-21 @ 08:15]  PTT: 70.7       [02-28-21 @ 08:15]      Creatinine Trend:  SCr 1.6 [02-28 @ 08:15]  SCr 1.5 [02-27 @ 08:06]  SCr 1.7 [02-26 @ 07:05]  SCr 2.2 [02-25 @ 08:14]  SCr 2.2 [02-24 @ 08:12]        Ferritin 2995      [02-25-21 @ 08:14]

## 2021-02-28 NOTE — PROGRESS NOTE ADULT - SUBJECTIVE AND OBJECTIVE BOX
Progress Note  This morning patient was seen and examined at bedside.    Today is hospital day 12d.  Mr. Smiley is doing fine.   He reports he had a good night sleep. He is alternating between HF 70L 100% for 2 hours and NIV for 4 hours during day and overnight. He denies any abdominal pain, diarrhea, or constipation. He had a BM was a few days ago. He is not ambulating and denies any chest pain, palpitations, or light headedness. He is voiding freely and denies urinary symptoms (dysuria, urgency, frequency, intermittence).      Vital Signs in the last 24 hours   Vitals Summary T(C): 36.1 (02-27-21 @ 20:00), Max: 36.2 (02-27-21 @ 12:00)  HR: 113 (02-28-21 @ 07:00) (65 - 121)  BP: 115/75 (02-28-21 @ 07:00) (98/57 - 123/84)  RR: 22 (02-28-21 @ 07:00) (19 - 30)  SpO2: 100% (02-28-21 @ 07:00) (91% - 100%)  Vent Data   Intake/ Output   02-27-21 @ 07:01  -  02-28-21 @ 07:00  --------------------------------------------------------  IN: 304 mL / OUT: 250 mL / NET: 54 mL      Physical Exam  * General Appearance: Alert, cooperative, interactive, well-groomed, oriented to time, place, and person, in no acute distress  * Head: Normocephalic, without obvious abnormality, atraumatic  * Lungs: Respirations unlabored, Good bilateral air entry, normal breath sounds (Clear to auscultation bilaterally, no audible wheezes, crackles, or rhonchi)  * Chest Wall: No tenderness or deformity  * Heart: Regular Rate and Rhythm, normal S1 and S2, no audible murmur, rub, or gallop  * Abdomen: Symmetric, non-distended, no scar, soft, non-tender, bowel sounds active all four quadrants, no masses, no organomegaly (no hepatosplenomegaly)  * Extremities: Extremities normal, atraumatic, no cyanosis, no lower extremity pitting edema bilaterally, adequate dorsalis pedis pulses  * Pulses: 2+ and symmetric all extremities  * Skin: Skin color, texture, turgor normal, no rashes or lesions  * Lymph nodes: Cervical, supraclavicular, and axillary nodes normal  * Neurologic: CNII-XII intact, normal strength, sensation and reflexes  throughout      Investigations   Laboratory Workup  - CBC:                        15.8   20.76 )-----------( 239      ( 27 Feb 2021 08:06 )             46.7     - Chemistry:  02-27    136  |  101  |  85<HH>  ----------------------------<  126<H>  5.4<H>   |  23  |  1.5    Ca    7.8<L>      27 Feb 2021 08:06  Mg     3.3     02-27    TPro  5.5<L>  /  Alb  2.6<L>  /  TBili  0.8  /  DBili  x   /  AST  44<H>  /  ALT  29  /  AlkPhos  119<H>  02-27    - Coagulation Studies:  PT/INR - ( 27 Feb 2021 08:06 )   PT: 15.40 sec;   INR: 1.34 ratio    PTT - ( 27 Feb 2021 08:06 )  PTT:70.3 sec        Current Medications  Standing Medications  aMIOdarone    Tablet 200 milliGRAM(s) Oral every 12 hours  diltiazem    Tablet 60 milliGRAM(s) Oral every 8 hours  heparin  Infusion 1100 Unit(s)/Hr (11 mL/Hr) IV Continuous <Continuous>  influenza   Vaccine 0.5 milliLiter(s) IntraMuscular once  insulin glargine Injectable (LANTUS) 30 Unit(s) SubCutaneous at bedtime  insulin lispro (ADMELOG) corrective regimen sliding scale   SubCutaneous three times a day before meals  insulin lispro (ADMELOG) corrective regimen sliding scale   SubCutaneous at bedtime  insulin lispro Injectable (ADMELOG) 12 Unit(s) SubCutaneous three times a day before meals  metoprolol tartrate 50 milliGRAM(s) Oral two times a day  pantoprazole    Tablet 40 milliGRAM(s) Oral before breakfast  simvastatin 20 milliGRAM(s) Oral at bedtime  tamsulosin 0.4 milliGRAM(s) Oral at bedtime    PRN Medications  acetaminophen   Tablet .. 650 milliGRAM(s) Oral every 4 hours PRN Temp greater or equal to 38.5C (101.3F)  sodium chloride 0.65% Nasal 1 Spray(s) Both Nostrils two times a day PRN Nasal Congestion    Singles Doses Administered  (Floorstock) 1 each &lt;see task&gt; GiveOnce  (Floorstock) 1 each &lt;see task&gt; GiveOnce  (Floorstock) 1 each &lt;see task&gt; GiveOnce  aMIOdarone Infusion 0.501 mG/Min IV Continuous <Continuous>  aMIOdarone IVPB 150 milliGRAM(s) IV Intermittent once  dexAMETHasone  Injectable 6 milliGRAM(s) IV Push daily  digoxin  Injectable 0.25 milliGRAM(s) IV Push once  diltiazem    milliGRAM(s) Oral once  diltiazem Injectable 24 milliGRAM(s) IV Push once  diltiazem Injectable 10 milliGRAM(s) IV Push once PRN  diltiazem Injectable 10 milliGRAM(s) IV Push once  diltiazem Injectable 10 milliGRAM(s) IV Push once  furosemide   Injectable 40 milliGRAM(s) IV Push once  furosemide   Injectable 40 milliGRAM(s) IV Push once  insulin glargine Injectable (LANTUS) 15 Unit(s) SubCutaneous once  insulin lispro Injectable (ADMELOG) 6 Unit(s) SubCutaneous once  insulin lispro Injectable (ADMELOG). 12 Unit(s) SubCutaneous once  metoprolol tartrate Injectable 5 milliGRAM(s) IV Push once  phytonadione  IVPB 5 milliGRAM(s) IV Intermittent once  phytonadione  IVPB 2.5 milliGRAM(s) IV Intermittent once  remdesivir  IVPB 100 milliGRAM(s) IV Intermittent every 24 hours  sodium zirconium cyclosilicate 5 Gram(s) Oral once  sodium zirconium cyclosilicate 5 Gram(s) Oral once  sodium zirconium cyclosilicate 5 Gram(s) Oral once  sodium zirconium cyclosilicate 10 Gram(s) Oral once

## 2021-02-28 NOTE — PROGRESS NOTE ADULT - ASSESSMENT
75 yo M with PMx Chronic Afib on Coumadin, HTN, DLD, DM II,  with recent positive COVID test on 02/11 who presented with 1 week of SOB on 02/12, transferred to CEU due to acute hypoxic respiratory failure due to COVID PNA, complicated by DVT and possible PE    Acute Hypoxemic Respiratory Failure Secondary to COVID Pneumonia  Provoked DVT/Possible PE  currently on bipap, alternating with HFNC  s/p RDV 2/13-2/17, s/p 1u convalescent plasma 2/13  s/p 10 days course of decadron  ID on board  taper o2 as tolerated  trend inflammatory markers   LE duplex (02/19): left common femoral, femoral, popliteal DVT, left gastrocnemius, right small superficial thrombosis  continue with Heparin Gtt for  now, monitor PTTs  hold off on CTA at this time given PRAVIN    PRAVIN, likely prerenal - improving  Hyperkalemia  Scr 1.6 today, baseline 1.1-1.3  Lasix on hold for now, off IVF  monitor daily BMP  monitor I&O, Lokelma 10g PRN for K>5.5  renal eval Dr Grady following    Chronic Atrial Fibrillation   Hypertension  HR controlled   Cardio on board, c/w  metoprolol 50 Q12H, Amiodarone 200 Q12H, start Cardizem 60 Q8H, continue with heparin GTT  case discussed with heme/onc, given extensive DVTs while on coumadin, only start bridging to coumadin once Covid infection resolves     DM II  Hba1c 11  continue with Lantus 30 and Lispro 12 AC with ISS  monitor FS    Patient requires continuous monitoring in CEU

## 2021-02-28 NOTE — PHYSICAL THERAPY INITIAL EVALUATION ADULT - SPECIFY REASON(S)
Pt is currently on BIPAP,  SPO2 85%, -140 bpm, recommend to hold PT at this time. Will f/u for PT Initial Evaluation when medically appropriate.

## 2021-03-01 NOTE — PROGRESS NOTE ADULT - ASSESSMENT
Pt is a 76 year old M with recent positive COVID test on 02/11 who presented with 1 week of SOB on 02/12, found to have COVID pneumonia, admitted on 02/12 for hypoxic RF secondary to COVID pneumonia with stay complicated by acute DVT and possible PE. Currently hemodynamically stable.    #Acute Hypoxic Respiratory Failure Secondary to COVID Pneumonia  Recent COVID positive test on 02/11. SARS-COV2: positive 02/12. Chest X Ray: 02/12 bilateral opacities  - Infectious Disease and Pulmonary team are on board  - Monitor for fever: afebrile in last 24 hours. Tylenol PRN for fever  - Trend WBC: 02/22 20.46 -> 02/24 20.72 -> 02/26 20.4 -> 20.76 02/27 -> 19.5 3/1  - Monitor SaO2 and Oxygen Requirements: Requiring BIPAP continuously. NIV AVAPs EPAP 10 FiO2 100%  - Chest X Ray 02/28: unchanged bilateral opacities  - Trend Inflammatory Markers:  --> D-dimer 68 02/12 -> 2959 02/22 -> 3921 02/24 -> 02/26 2766 -> 02/28 2300  --> Procalcitonin 0.06 02/12 -> 0.24 (02/20) -> 0.28 02/21 -> 02/22 00.18 -> 02/24 0.12 -> 02/26 0.08-> 02/28 pend  --> C-reactive protein 02/12 8.53 -> 02/17 5.99 -> 02/18 9.66 -> 13.5 02/21 -> 02/22 6.78 -> 02/24 2.45 -> 02/26 0.82-> 02/28 pend  --> Ferritin 02/12 982 -> 1137 (02/13) -> 1246 (02/15) -> 1231 (02/17) -> 02/22 2398 -> 02/25 2995 -> 02/28 pend  --> LDH 02/12 263 -> 442 (02/16)  - Follow up blood cultures 02/24: NGTD  - COVID therapy:  --> S/P 1 unit of convalescent plasma 02/13  --> s/p IV Remdesevir: S/P 100mg QD from 02/13-02/17  --> s/p 10-day course of IV Dexamethasone 6mg QD from 02/15-02/24  - Therapeutic Anticoagulation Heparin Drip (details under Acute DVT and possible PE)    #Provoked DVT  #Possible PE  Duplex Venous (02/19): left common femoral, femoral, popliteal DVT, left gastrocnemius, right small superficial thrombosis. Home med: coumadin for afib. D-dimer following. ED 02/12 INR 1.83 s/p Vitamin K x2 doses  - Heme onc on board for supra therapeutic INR in ED and presumed PE/ DIC: will consider bridging to coumadin (plus Aspirin 81mg QD per Dr Carl) once clinically improves and COVID resolves. Last coumadin dose 3mg x1 02/14.   - Continue Heparin Drip for now: currently at rate of 11ml/hour. FU aPTT qD    #Pre-Renal PRAVIN Secondary to reduced PO intake on NR and possibly the IV Lasix dose of 40mg on 02/20  * Possibly Secondary to reduced PO intake on NR and possibly the IV Lasix dose of 40mg on 02/20  * Baseline Cr 1.1 -> 02/20 BUN 84, Cr 2.0  * 02/21 and 02/22 Cr 2.7  - Nephrology Dr Grady consulted on 02/25  - Monitor BUN and Cr: 02/25 Cr 2.2 -> 02/26 BUN 14, Cr 1.7 -> 02/27 Cr 1.5  - Continue holding PO Lasix 40mg QD since 02/24   - Discontinued gentle IV fluid hydration with LR at 50mL/hour that 02/24- 02/27  - Monitor I/O      Chronic Atrial Fibrillation  * Rate uncontrolled possibly 2ry to ARF  * Home coumadin 3mg QD, Cardizem 240mg QD  * ECG 02/13 afib with RVR  * S/P IV Digoxin 0.25mg x2 doses on 02/22. Digoxin level 02/23: 0.3    - Cardiology Dr Carl on board: last on 02/21  - Monitor HR 02/28 121, 118, 79bpm  - Continue PO Lopressor 5mg BID (Switched from IV Lopressor 5mg Q6h on 02/25)  - Continue PO Amiodarone 200mg BID (Switched from Amio drip at 16.7ml/hour on 02/25)   - Started PO Cardizem 60mg Q8h on 02/26 and discontinued Cardizem drip on 02/27  - If HR remains controlled with above-mentioned plan, will consider discontinuing PO Amiodarone and keeping only: PO Lopressor 5mg BID and PO Cardizem 60mg Q8h per Dr Carl  - For AC: heparin drip as above  - Keep K>4, Mg>2      Hypertension  * Home meds Losartan 50mg BID, Cardizem 240mg QD, and Lasix 40mg QD  - Monitor BP: 02/28 100/65, 118/62 mmHg  - Holding Lasix PO 40mg QD starting 02/24 for pre-renal PRAVIN  - Continue PO Lopressor 5mg BID (Switched from IV Lopressor 5mg Q6h on 02/25)  - Continue PO Amiodarone 200mg BID (Switched from Amio drip at 16.7ml/hour on 02/25)   - Started PO Cardizem 60mg Q8h on 02/26 and discontinued Cardizem drip on 02/27      DM II  * a1c 02/13 11  * Home metformin 500mg BID  - Monitor POCT 02/27 115, 122, 168, 120  - Continue Lantus 30u, Lispro 12 u TID, and Scale B      GERD  * Home Omperazole 20mg QD  - Protonix 40mg QD PO      DL  * No lipid profile  * Home simvastatin 20mg QD  - Continue Simvastatin 20mg QD    Others  - DVT Prophylaxis: Heparin Drip as above  - GI Prophylaxis: Pantoprazole 40mg PO QD  - Diet: Dysphagia 3 Soft Thin Diet + Gelatin BID  - Code Status: Full per [facility] 02/24   Pt is a 76 year old M with recent positive COVID test on 02/11 who presented with 1 week of SOB on 02/12, found to have COVID pneumonia, admitted on 02/12 for hypoxic RF secondary to COVID pneumonia with stay complicated by acute DVT and possible PE. Currently hemodynamically stable.    #Acute Hypoxic Respiratory Failure Secondary to COVID Pneumonia  Recent COVID positive test on 02/11. SARS-COV2: positive 02/12. Chest X Ray: 02/12 bilateral opacities  - Infectious Disease and Pulmonary team are on board  - Monitor for fever: afebrile in last 24 hours. Tylenol PRN for fever  - Trend WBC: 02/22 20.46 -> 02/24 20.72 -> 02/26 20.4 -> 20.76 02/27 -> 19.5 3/1  - Monitor SaO2 and Oxygen Requirements: Requiring BIPAP continuously. NIV AVAPs EPAP 10 FiO2 100%. May need intubation if cannot tolerated off BIPAP.   - Chest X Ray 02/28: unchanged bilateral opacities  - Trend Inflammatory Markers:  --> D-dimer 68 02/12 -> 2959 02/22 -> 3921 02/24 -> 02/26 2766 -> 02/28 2300  --> Procalcitonin 0.06 02/12 -> 0.24 (02/20) -> 0.28 02/21 -> 02/22 00.18 -> 02/24 0.12 -> 02/26 0.08-> 02/28 pend  --> C-reactive protein 02/12 8.53 -> 02/17 5.99 -> 02/18 9.66 -> 13.5 02/21 -> 02/22 6.78 -> 02/24 2.45 -> 02/26 0.82-> 02/28 pend  --> Ferritin 02/12 982 -> 1137 (02/13) -> 1246 (02/15) -> 1231 (02/17) -> 02/22 2398 -> 02/25 2995 -> 02/28 pend  --> LDH 02/12 263 -> 442 (02/16)  - Follow up blood cultures 02/24: NGTD  - COVID therapy:  --> S/P 1 unit of convalescent plasma 02/13  --> s/p IV Remdesevir: S/P 100mg QD from 02/13-02/17  --> s/p 10-day course of IV Dexamethasone 6mg QD from 02/15-02/24  - Therapeutic Anticoagulation Heparin Drip (details under Acute DVT and possible PE)    #Provoked DVT  #Possible PE  Duplex Venous (02/19): left common femoral, femoral, popliteal DVT, left gastrocnemius, right small superficial thrombosis. Home med: coumadin for afib. D-dimer following. ED 02/12 INR 1.83 s/p Vitamin K x2 doses  - Heme onc on board for supra therapeutic INR in ED and presumed PE/ DIC: will consider bridging to coumadin (plus Aspirin 81mg QD per Dr Carl) once clinically improves and COVID resolves. Last coumadin dose 3mg x1 02/14.   - Continue Heparin Drip for now: currently at rate of 11ml/hour. FU aPTT qD    #Pre-Renal PRAVIN Secondary to reduced PO intake on NR and possibly the IV Lasix dose of 40mg on 02/20  Possibly Secondary to reduced PO intake on NR and possibly the IV Lasix dose of 40mg on 02/20. Baseline Cr 1.1 -> 02/20 BUN 84, Cr 2.0.   - 02/21 and 02/22 Cr 2.7  - Nephrology Dr Grady consulted on 02/25: monitor off IV fluids, hold lasix, avoid zaragoza, avoid CT contrast for risk of contrast-induced nephropathy  - Monitor BUN and Cr: 02/25 Cr 2.2 -> 02/26 BUN 14, Cr 1.7 -> 02/27 Cr 1.5 > 3/1 Cr 1.6  - Continue holding PO Lasix 40mg QD since 02/24   - Monitor I/O    #Chronic Atrial Fibrillation  Rate uncontrolled possibly 2ry to ARF. Home coumadin 3mg QD, Cardizem 240mg QD. ECG 02/13 afib with RVR  - S/P IV Digoxin 0.25mg x2 doses on 02/22. Digoxin level 02/23: 0.3  - Cardiology Dr Carl on board: last on 02/21  - Continue PO Lopressor 5mg BID (Switched from IV Lopressor 5mg Q6h on 02/25)  - Continue PO Amiodarone 200mg BID (Switched from Amio drip at 16.7ml/hour on 02/25)   - Started PO Cardizem 60mg Q8h on 02/26 and discontinued Cardizem drip on 02/27  - If HR remains controlled with above-mentioned plan, will consider discontinuing PO Amiodarone and keeping only: PO Lopressor 5mg BID and PO Cardizem 60mg Q8h per Dr Carl  - For AC: heparin drip as above  - Keep K>4, Mg>2    #hyperkalemia  K+ 5.6 2/28  -s/p 10g lokelma  -K+ 5.4 3/1, give additional 10g lokelma    #Hypertension  Home meds Losartan 50mg BID, Cardizem 240mg QD, and Lasix 40mg QD.   - Monitor BP: 02/28 100/65, 118/62 mmHg  - Holding Lasix PO 40mg QD starting 02/24 for pre-renal PRAVIN  - Continue PO Lopressor 5mg BID (Switched from IV Lopressor 5mg Q6h on 02/25)  - Continue PO Amiodarone 200mg BID (Switched from Amio drip at 16.7ml/hour on 02/25)   - Started PO Cardizem 60mg Q8h on 02/26 and discontinued Cardizem drip on 02/27    #DM II  A1c 02/13 11. Home metformin 500mg BID  - Monitor POCT   - Continue Lantus 30u, Lispro 12 u TID, and Scale B    #GERD  Home Omperazole 20mg QD  - c/w protonix 40mg QD PO    #HLD  No lipid profile. Home simvastatin 20mg QD  - Continue Simvastatin 20mg QD    DVT ppx: heparin drip  GI ppx: pantoprazole 40mg qD  Labs: CMP+Mg, CBC   Diet: NPO on BIPAP > get nutrition c/s for PPN/TPN if cannot tolerated off BIPAP  Dispo: pending clinical improvement  FULL CODE per GO 2/24

## 2021-03-01 NOTE — PROGRESS NOTE ADULT - ASSESSMENT
77 yo M with PMx Chronic Afib on Coumadin, HTN, DLD, DM II,  with recent positive COVID test on 02/11 who presented with 1 week of SOB on 02/12, transferred to CEU due to acute hypoxic respiratory failure due to COVID PNA, complicated by DVT and possible PE    Acute Hypoxemic Respiratory Failure Secondary to COVID Pneumonia  Provoked DVT/Possible PE  has been bipap dependent since yesterday  s/p RDV 2/13-2/17, s/p 1u convalescent plasma 2/13  s/p 10 days course of decadron  ID on board  taper o2 as tolerated  trend inflammatory markers   LE duplex (02/19): left common femoral, femoral, popliteal DVT, left gastrocnemius, right small superficial thrombosis  continue with Heparin Gtt for  now, monitor PTTs  hold off on CTA at this time given PRAVIN    PRAVIN, likely prerenal   Hyperkalemia  Scr 1.6 today, baseline 1.1-1.3  Lasix on hold for now, off IVF  monitor daily BMP  monitor I&O, Lokelma 10g PRN for K>5.5  renal eval Dr Grady following    Chronic Atrial Fibrillation   Hypertension  HR uncontrolled today due to continuous bipap  Cardio on board, change PO Metoprolol to IV, and start cardizem GTT for now, continue with heparin GTT  Will change back to PO if able to tolerate HFNC  case discussed with heme/onc, given extensive DVTs while on coumadin, only start bridging to coumadin once Covid infection resolves     DM II  Hba1c 11  continue with Lantus 30 and Lispro 12 AC with ISS  monitor FS    Patient requires continuous monitoring in CEU  Poor prognosis

## 2021-03-01 NOTE — PROGRESS NOTE ADULT - ASSESSMENT
· Assessment	  76 year old Male with a past medical history of HTN, HLD, BPH, Hepatomegaly, "Heart valve disorder" ?Afib on Coumadin, presented to the ER with a chief complaint of Weakness and shortness of breath x 1 week, covid + x 1 day. Patient is being admitted for increased weakness secondary to COVID infection     IMPRESSION;  Clinically no change  COVID 19 with severe illness. SpO2 < 94% on RA and need for supplemental O2. HFNC  Pt was  in the early viral replicative phase based on the timeline/onset of symptoms.   COVID-19 antibodies NG  procalcitonin 0.08  , not suggestive of a bacterial PNA.  Ferritin 1246>1231>1231  CRP 7.70 >5.9>13.5>2.45  Ddimers 197>2959 ( suggestive of a pulmonary embolus ) > 3921  Legionella NG  Worsening renal function    s/p RDV   s/p plasma  s/p Dexamethason 2/13-24    RECOMMENDATIONS;  Target SpO2 92 % to 96 %  Anticoagulation as per team  recall prn please

## 2021-03-01 NOTE — CHART NOTE - NSCHARTNOTEFT_GEN_A_CORE
Registered Dietitian Follow-Up     Patient Profile Reviewed                           Yes [x]   No []     Nutrition History Previously Obtained        Yes []  No [x]  Again unable to reach pt via room phone.      Pertinent Subjective Information: Unable to conduct face to face assessment d/t COVID-19 isolation precautions. Unable to speak to pt as he is on BiPAP this morning. Pt NPO since 2/28 @15:00, prior to this pt receiving RD recommended diet of dysphagia III, thin liquids, CHO consistent + SF Prosource Gelatin 2x & Glucerna 3x/day. Pt made NPO d/t need for continuous BiPAP. Per RN intake remained suboptimal, <50%, prior to NPO. Recs at end of document d/w LIP (Dr. Keller).      Pertinent Medical Interventions:  1. Acute Hypoxic Respiratory Failure 2/2 COVID PNA--requiring continuous BiPAP, possible need for intubation if cannot tolerate off BiPAP  2. Provoked DVT; possible PE--heparin drip   3. Pre-renal PRAVIN 2/2 poor PO intake & possibly d/t lasix--lasix held, IVF d/c'd. Nephro following, awaiting follow up    4. Hyperkalemia--lokelma initiated   5. Chronic Afib--rate uncontrolled  6. T2DM--Lantus 30u, Lispro 12 u TID     Diet order: DASH/TLC      Anthropometrics:  - Ht. 177.8cm   - Wt  96kg (2/19) dosing wt   94.2kg (2/23) no new wt   - BMI 30.3   - IBW 75.4kg      Pertinent Lab Data: (3/01/21) WBC 19.57, POCT 139, glucose 157, K+ 5.4, BUN 72, Cr 1.6, GFR 42, Mg 3.4      Pertinent Meds: heparin infusion, lantus, admelog, lokelma, cardizem, amiodarone, metoprolol, protonix, simvastatin    Physical Findings:  - Appearance: AAOx4   - GI function: obese, rounded abdomen. no BM since 2/17   - Tubes: n/a   - Oral/Mouth cavity: NPO d/t BiPAP   - Skin: abrasion to bridge of nose. 1+ (generalized) edema      Nutrition Requirements  Weight Used: 75.4kg IBW (continued from RD initial assessment)      Estimated Energy Needs    Continue [x]  7323-0881 kcal/day (20-25 kcal/kg IBW d/t BMI 30 & COVID PNA)   Estimated Protein Needs    Continue [x]  75-90 gm/day (1.0-1.2 g/kg IBW, reasons mentioned above)   Estimated Fluid Needs        Continue [x]  per CEU team      Nutrient Intake: not meeting needs d/t NPO      [x] Previous Nutrition Diagnosis:  Inadequate Oral Intake.            [x] Ongoing          [] Resolved     Nutrition Intervention: coordination of care, meals & snacks, medical food supplements, nutrition related medication mgmt  Recommend:   1. If pt unable to tolerate off BiPAP in 48H, c/s NST for parenteral nutrition assessment.   2. If able to resume PO intake recommend continuing Dysphagia III mechanical soft, thin liquids + CHO Consistent + Glucerna TID & sugar free Prosouce Gelatin BID.   3. Check serum vitamin D 25-hydroxy & supplement PRN.   4. Consider adding daily multivitamin w. out minerals. If PO intake resumed, consider daily appetite stimulant if not contraindicated.      Goal/Expected Outcome: NST c/s complete OR pt able to tolerate at least 50% meals & supplement upon f/u in 3 days.      Indicator/Monitoring: RD will monitor diet order, energy intake, nutrition related labs, body composition, NFPF (PO tolerance).

## 2021-03-01 NOTE — PROGRESS NOTE ADULT - SUBJECTIVE AND OBJECTIVE BOX
NEPHROLOGY FOLLOW UP NOTE    pt seen and examined  remains bipap dependent   off IVF  bp's fair  tachycardic  remains NPO    PAST MEDICAL & SURGICAL HISTORY:  Gastroesophageal reflux disease    Enlarged liver    BPH (benign prostatic hyperplasia)    Essential hypertension    Heart valve disorder    History of back surgery      Allergies:  No Known Allergies    Home Medications Reviewed    SOCIAL HISTORY:  Denies ETOH,Smoking,   FAMILY HISTORY:  Family history of esophageal cancer          REVIEW OF SYSTEMS:  unobtainable on bipap    PHYSICAL EXAM:  Constitutional: ill appearing   HEENT: + BPAP  Neck: No JVD  Respiratory: CTAB,  Cardiovascular: S1, S2, RRR  Gastrointestinal: BS+, soft, NT/ND  Extremities: No cyanosis or clubbing. No peripheral edema  Neurological: awake and alert, no limb weakness  : No CVA tenderness. No zaragoza  Skin: No rashes    Hospital Medications:   MEDICATIONS  (STANDING):  aMIOdarone    Tablet 200 milliGRAM(s) Oral every 12 hours  diltiazem Infusion 10 mG/Hr (10 mL/Hr) IV Continuous <Continuous>  furosemide   Injectable 40 milliGRAM(s) IV Push once  heparin  Infusion 1100 Unit(s)/Hr (11 mL/Hr) IV Continuous <Continuous>  influenza   Vaccine 0.5 milliLiter(s) IntraMuscular once  insulin glargine Injectable (LANTUS) 30 Unit(s) SubCutaneous at bedtime  insulin lispro (ADMELOG) corrective regimen sliding scale   SubCutaneous three times a day before meals  insulin lispro (ADMELOG) corrective regimen sliding scale   SubCutaneous at bedtime  insulin lispro Injectable (ADMELOG) 12 Unit(s) SubCutaneous three times a day before meals  metoprolol tartrate 50 milliGRAM(s) Oral two times a day  pantoprazole    Tablet 40 milliGRAM(s) Oral before breakfast  simvastatin 20 milliGRAM(s) Oral at bedtime  tamsulosin 0.4 milliGRAM(s) Oral at bedtime        VITALS:  T(F): 97.4 (03-01-21 @ 12:00), Max: 99.1 (03-01-21 @ 04:00)  HR: 128 (03-01-21 @ 16:00)  BP: 127/78 (03-01-21 @ 16:00)  RR: 28 (03-01-21 @ 16:00)  SpO2: 92% (03-01-21 @ 16:00)  Wt(kg): --    02-27 @ 07:01  -  02-28 @ 07:00  --------------------------------------------------------  IN: 304 mL / OUT: 250 mL / NET: 54 mL    02-28 @ 07:01  -  03-01 @ 07:00  --------------------------------------------------------  IN: 464 mL / OUT: 575 mL / NET: -111 mL    03-01 @ 07:01  -  03-01 @ 16:28  --------------------------------------------------------  IN: 77 mL / OUT: 0 mL / NET: 77 mL          LABS:  03-01    143  |  105  |  72<HH>  ----------------------------<  157<H>  5.4<H>   |  23  |  1.6<H>    Ca    8.6      01 Mar 2021 07:54  Mg     3.4     03-01    TPro  5.9<L>  /  Alb  2.6<L>  /  TBili  0.6  /  DBili      /  AST  33  /  ALT  24  /  AlkPhos  123<H>  03-01                          15.8   19.57 )-----------( 233      ( 01 Mar 2021 07:54 )             47.6       Urine Studies:        RADIOLOGY & ADDITIONAL STUDIES:

## 2021-03-01 NOTE — PROGRESS NOTE ADULT - SUBJECTIVE AND OBJECTIVE BOX
ELIZA POWELL  76y, Male    All available historical data reviewed    OVERNIGHT EVENTS:  no fevers  feels well and has no complaints  BIPAP    ROS:  General: Denies rigors, nightsweats  HEENT: Denies headache, rhinorrhea, sore throat, eye pain  CV: Denies CP, palpitations  PULM: Denies wheezing, hemoptysis  GI: Denies hematemesis, hematochezia, melena  : Denies discharge, hematuria  MSK: Denies arthralgias, myalgias  SKIN: Denies rash, lesions  NEURO: Denies paresthesias, weakness  PSYCH: Denies depression, anxiety    VITALS:  T(F): 97.4, Max: 99.1 (03-01-21 @ 04:00)  HR: 116  BP: 135/74  RR: 25Vital Signs Last 24 Hrs  T(C): 36.3 (01 Mar 2021 12:00), Max: 37.3 (01 Mar 2021 04:00)  T(F): 97.4 (01 Mar 2021 12:00), Max: 99.1 (01 Mar 2021 04:00)  HR: 116 (01 Mar 2021 13:00) (97 - 135)  BP: 135/74 (01 Mar 2021 13:00) (105/70 - 140/84)  BP(mean): 95 (01 Mar 2021 13:00) (89 - 100)  RR: 25 (01 Mar 2021 13:00) (20 - 30)  SpO2: 97% (01 Mar 2021 13:00) (95% - 100%)    TESTS & MEASUREMENTS:                        15.8   19.57 )-----------( 233      ( 01 Mar 2021 07:54 )             47.6     03-01    143  |  105  |  72<HH>  ----------------------------<  157<H>  5.4<H>   |  23  |  1.6<H>    Ca    8.6      01 Mar 2021 07:54  Mg     3.4     03-01    TPro  5.9<L>  /  Alb  2.6<L>  /  TBili  0.6  /  DBili  x   /  AST  33  /  ALT  24  /  AlkPhos  123<H>  03-01    LIVER FUNCTIONS - ( 01 Mar 2021 07:54 )  Alb: 2.6 g/dL / Pro: 5.9 g/dL / ALK PHOS: 123 U/L / ALT: 24 U/L / AST: 33 U/L / GGT: x             Culture - Blood (collected 02-24-21 @ 12:14)  Source: .Blood None  Preliminary Report (02-25-21 @ 18:02):    No growth to date.            RADIOLOGY & ADDITIONAL TESTS:  Personal review of radiological diagnostics performed  Echo and EKG results noted when applicable.     MEDICATIONS:  acetaminophen   Tablet .. 650 milliGRAM(s) Oral every 4 hours PRN  aMIOdarone    Tablet 200 milliGRAM(s) Oral every 12 hours  diltiazem    Tablet 60 milliGRAM(s) Oral every 8 hours  heparin  Infusion 1100 Unit(s)/Hr IV Continuous <Continuous>  influenza   Vaccine 0.5 milliLiter(s) IntraMuscular once  insulin glargine Injectable (LANTUS) 30 Unit(s) SubCutaneous at bedtime  insulin lispro (ADMELOG) corrective regimen sliding scale   SubCutaneous three times a day before meals  insulin lispro (ADMELOG) corrective regimen sliding scale   SubCutaneous at bedtime  insulin lispro Injectable (ADMELOG) 12 Unit(s) SubCutaneous three times a day before meals  metoprolol tartrate 50 milliGRAM(s) Oral two times a day  pantoprazole    Tablet 40 milliGRAM(s) Oral before breakfast  simvastatin 20 milliGRAM(s) Oral at bedtime  sodium chloride 0.65% Nasal 1 Spray(s) Both Nostrils two times a day PRN  tamsulosin 0.4 milliGRAM(s) Oral at bedtime      ANTIBIOTICS:

## 2021-03-01 NOTE — CHART NOTE - NSCHARTNOTESELECT_GEN_ALL_CORE
Event Note
Event Note
Upgrade/Transfer Note
Event Note
RD follow up/Event Note
RD follow up/Event Note
metoprolol/Event Note

## 2021-03-01 NOTE — PROGRESS NOTE ADULT - SUBJECTIVE AND OBJECTIVE BOX
ELIZA POWELL  76y Male    CHIEF COMPLAINT:    Patient is a 76y old  Male who presents with a chief complaint of atrial fibrillation (26 Feb 2021 19:27)      INTERVAL HPI/OVERNIGHT EVENTS:    Patient seen and examined. No acute events overnight. Has been bipap dependent since yesterday    ROS: All other systems are negative.    Vital Signs:    T(F): 97.4 (03-01-21 @ 12:00), Max: 99.1 (03-01-21 @ 04:00)  HR: 116 (03-01-21 @ 13:00) (97 - 135)  BP: 135/74 (03-01-21 @ 13:00) (105/70 - 140/84)  RR: 25 (03-01-21 @ 13:00) (20 - 30)  SpO2: 97% (03-01-21 @ 13:00) (95% - 100%)    28 Feb 2021 07:01  -  01 Mar 2021 07:00  --------------------------------------------------------  IN: 464 mL / OUT: 575 mL / NET: -111 mL    01 Mar 2021 07:01  -  01 Mar 2021 15:21  --------------------------------------------------------  IN: 77 mL / OUT: 0 mL / NET: 77 mL    POCT Blood Glucose.: 154 mg/dL (01 Mar 2021 14:58)  POCT Blood Glucose.: 151 mg/dL (01 Mar 2021 13:05)  POCT Blood Glucose.: 139 mg/dL (01 Mar 2021 08:32)  POCT Blood Glucose.: 176 mg/dL (28 Feb 2021 21:45)  POCT Blood Glucose.: 168 mg/dL (28 Feb 2021 17:00)    PHYSICAL EXAM:    GENERAL:  NAD  SKIN: No rashes or lesions  HEENT: Atraumatic. Normocephalic.   NECK: Supple, No JVD.  PULMONARY: coarse breath sounds. No wheezing. No rales  CVS: Normal S1, S2. Rate and Rhythm are regular.    ABDOMEN/GI: Soft, Nontender, Nondistended; BS present  MSK:  No edema B/L LE. No clubbing or cyanosis  NEUROLOGIC: moves all extremities  PSYCH: Alert & oriented x 3, normal affect    Consultant(s) Notes Reviewed:  [x ] YES  [ ] NO  Care Discussed with Consultants/Other Providers [ x] YES  [ ] NO    LABS:                        15.8   19.57 )-----------( 233      ( 01 Mar 2021 07:54 )             47.6     143  |  105  |  72<HH>  ----------------------------<  157<H>  5.4<H>   |  23  |  1.6<H>    Ca    8.6      01 Mar 2021 07:54  Mg     3.4     03-01    TPro  5.9<L>  /  Alb  2.6<L>  /  TBili  0.6  /  DBili  x   /  AST  33  /  ALT  24  /  AlkPhos  123<H>  03-01    PT/INR - ( 28 Feb 2021 08:15 )   PT: 13.80 sec;   INR: 1.20 ratio      PTT - ( 01 Mar 2021 07:54 )  PTT:62.7 sec    RADIOLOGY & ADDITIONAL TESTS:  < from: Xray Chest 1 View- PORTABLE-Routine (Xray Chest 1 View- PORTABLE-Routine in AM.) (03.01.21 @ 06:29) >  Impression:  Increased bilateral opacities. No definite pneumothorax.    < end of copied text >    Imaging or report Personally Reviewed:  [x] YES  [ ] NO  EKG reviewed: [x] YES  [ ] NO    Medications:  Standing  aMIOdarone    Tablet 200 milliGRAM(s) Oral every 12 hours  dextrose 50% Injectable 50 milliLiter(s) IV Push once  diltiazem    Tablet 60 milliGRAM(s) Oral every 8 hours  diltiazem Infusion 10 mG/Hr IV Continuous <Continuous>  heparin  Infusion 1100 Unit(s)/Hr IV Continuous <Continuous>  influenza   Vaccine 0.5 milliLiter(s) IntraMuscular once  insulin glargine Injectable (LANTUS) 30 Unit(s) SubCutaneous at bedtime  insulin lispro (ADMELOG) corrective regimen sliding scale   SubCutaneous three times a day before meals  insulin lispro (ADMELOG) corrective regimen sliding scale   SubCutaneous at bedtime  insulin lispro Injectable (ADMELOG) 12 Unit(s) SubCutaneous three times a day before meals  insulin regular  human recombinant. 10 Unit(s) IV Push once  metoprolol tartrate 50 milliGRAM(s) Oral two times a day  pantoprazole    Tablet 40 milliGRAM(s) Oral before breakfast  simvastatin 20 milliGRAM(s) Oral at bedtime  tamsulosin 0.4 milliGRAM(s) Oral at bedtime    PRN Meds  acetaminophen   Tablet .. 650 milliGRAM(s) Oral every 4 hours PRN  sodium chloride 0.65% Nasal 1 Spray(s) Both Nostrils two times a day PRN

## 2021-03-01 NOTE — PROGRESS NOTE ADULT - SUBJECTIVE AND OBJECTIVE BOX
SUBJECTIVE:    Patient is a 76y old Male who presents with a chief complaint of atrial fibrillation (26 Feb 2021 19:27)    Currently admitted to medicine with the primary diagnosis of COVID-19    Today is hospital day 14d. This morning he is resting comfortably in bed and reports no new issues or overnight events. Complains of some SOB. On BIPAP. No fevers, chills. Denies CP.      PAST MEDICAL & SURGICAL HISTORY  Gastroesophageal reflux disease    Enlarged liver    BPH (benign prostatic hyperplasia)    Essential hypertension    Heart valve disorder    History of back surgery      SOCIAL HISTORY:  Negative for smoking/alcohol/drug use.     ALLERGIES:  No Known Allergies    MEDICATIONS:  STANDING MEDICATIONS  aMIOdarone    Tablet 200 milliGRAM(s) Oral every 12 hours  diltiazem    Tablet 60 milliGRAM(s) Oral every 8 hours  heparin  Infusion 1100 Unit(s)/Hr IV Continuous <Continuous>  influenza   Vaccine 0.5 milliLiter(s) IntraMuscular once  insulin glargine Injectable (LANTUS) 30 Unit(s) SubCutaneous at bedtime  insulin lispro (ADMELOG) corrective regimen sliding scale   SubCutaneous three times a day before meals  insulin lispro (ADMELOG) corrective regimen sliding scale   SubCutaneous at bedtime  insulin lispro Injectable (ADMELOG) 12 Unit(s) SubCutaneous three times a day before meals  metoprolol tartrate 50 milliGRAM(s) Oral two times a day  pantoprazole    Tablet 40 milliGRAM(s) Oral before breakfast  simvastatin 20 milliGRAM(s) Oral at bedtime  sodium zirconium cyclosilicate 10 Gram(s) Oral once  tamsulosin 0.4 milliGRAM(s) Oral at bedtime    PRN MEDICATIONS  acetaminophen   Tablet .. 650 milliGRAM(s) Oral every 4 hours PRN  sodium chloride 0.65% Nasal 1 Spray(s) Both Nostrils two times a day PRN    VITALS:   T(F): 97.8  HR: 126  BP: 139/80  RR: 26  SpO2: 97%    LABS:                        15.8   19.57 )-----------( 233      ( 01 Mar 2021 07:54 )             47.6     03-01    143  |  105  |  72<HH>  ----------------------------<  157<H>  5.4<H>   |  23  |  1.6<H>    Ca    8.6      01 Mar 2021 07:54  Mg     3.4     03-01    TPro  5.9<L>  /  Alb  2.6<L>  /  TBili  0.6  /  DBili  x   /  AST  33  /  ALT  24  /  AlkPhos  123<H>  03-01    PT/INR - ( 28 Feb 2021 08:15 )   PT: 13.80 sec;   INR: 1.20 ratio         PTT - ( 01 Mar 2021 07:54 )  PTT:62.7 sec    RADIOLOGY:  < from: Xray Chest 1 View- PORTABLE-Routine (Xray Chest 1 View- PORTABLE-Routine in AM.) (02.28.21 @ 05:45) >  Impression:  Scattered bilateral opacities, unchanged.  < end of copied text >    PHYSICAL EXAM:  GEN: NAD, lying in bed  HEENT: EOMI, PERRLA, conjunctiva and sclera clear. MMM.  LUNGS: Clear to auscultation bilaterally. No rales, rhonchi, or wheezing appreciated. On BIPAP.  HEART: S1/S2 present. IRR.   ABD: Soft, non-tender, non-distended. Bowel sounds present.  EXT: 2+ peripheral pulses. No clubbing, cyanosis, or edema.   NEURO: AAOX3. Speech clear. No focal neurological deficits. Sensation grossly intact.   Skin: No rashes or lesions.

## 2021-03-01 NOTE — CHART NOTE - NSCHARTNOTEFT_GEN_A_CORE
Attempted to call spouse Rupali at [phone] twice today to give updates, discuss goals of care. Family did not answer and no VM available.

## 2021-03-01 NOTE — PROGRESS NOTE ADULT - ASSESSMENT
PRAVIN   - improved   - nl baseline Cr on 2/19/21   hyperkalemia  ARF due to COVID-19 PNA   - cxr worse and bipap dependent  valvular heart disease / afib  HTN  new DVT / possible PE  BPH    plan:    off IVF  give lasix 40mg iv x 1  cardizem gtt  low K+ diet when off bipap  lokelma 10g PRN    cont flomax    avoid iv dye  f/u labs  icu care

## 2021-03-02 NOTE — PHYSICAL THERAPY INITIAL EVALUATION ADULT - SPECIFY REASON(S)
Pt is currently on HF 60% with NRB on top with O2 sat at 84%. Will hold PT for now & follow up as appropriate. Discussed the same with KATIE Ni.

## 2021-03-02 NOTE — PROGRESS NOTE ADULT - ASSESSMENT
PRAVIN  hyperkalemia  ARF due to COVID-19 PNA  valvular heart disease / afib  HTN  new DVT / possible PE  BPH    plan:    PRN lasix IV  low K+ diet   lokelma 10g PRN    cont flomax  cardizem gtt / po amiodarone  heparin gtt  lantus / humalog   avoid iv dye  f/u labs  icu care

## 2021-03-02 NOTE — PROGRESS NOTE ADULT - SUBJECTIVE AND OBJECTIVE BOX
NEPHROLOGY FOLLOW UP NOTE    pt seen and examined  on and off bipap  on cardizem gtt  bp's fair  incontinent of urine      PAST MEDICAL & SURGICAL HISTORY:  Gastroesophageal reflux disease    Enlarged liver    BPH (benign prostatic hyperplasia)    Essential hypertension    Heart valve disorder    History of back surgery      Allergies:  No Known Allergies    Home Medications Reviewed    SOCIAL HISTORY:  Denies ETOH,Smoking,   FAMILY HISTORY:  Family history of esophageal cancer          REVIEW OF SYSTEMS:  unobtainable      PHYSICAL EXAM:  Constitutional: ill appearing   HEENT: + hfo2  Neck: No JVD  Respiratory: CTAB,  Cardiovascular: S1, S2, RRR  Gastrointestinal: BS+, soft, NT/ND  Extremities: No cyanosis or clubbing. 1+ peripheral edema  Neurological: awake and alert, no limb weakness  : No CVA tenderness. No zaragoza  Skin: No rashes    Hospital Medications:   MEDICATIONS  (STANDING):  aMIOdarone    Tablet 200 milliGRAM(s) Oral every 12 hours  diltiazem Infusion 20 mG/Hr (20 mL/Hr) IV Continuous <Continuous>  heparin  Infusion 1100 Unit(s)/Hr (11 mL/Hr) IV Continuous <Continuous>  influenza   Vaccine 0.5 milliLiter(s) IntraMuscular once  insulin glargine Injectable (LANTUS) 30 Unit(s) SubCutaneous at bedtime  insulin lispro (ADMELOG) corrective regimen sliding scale   SubCutaneous three times a day before meals  insulin lispro (ADMELOG) corrective regimen sliding scale   SubCutaneous at bedtime  insulin lispro Injectable (ADMELOG) 12 Unit(s) SubCutaneous three times a day before meals  metoprolol tartrate 50 milliGRAM(s) Oral two times a day  pantoprazole    Tablet 40 milliGRAM(s) Oral before breakfast  simvastatin 20 milliGRAM(s) Oral at bedtime  tamsulosin 0.4 milliGRAM(s) Oral at bedtime        VITALS:  T(F): 97.6 (03-02-21 @ 16:00), Max: 98 (03-02-21 @ 04:00)  HR: 106 (03-02-21 @ 17:00)  BP: 107/72 (03-02-21 @ 17:00)  RR: 26 (03-02-21 @ 17:00)  SpO2: 87% (03-02-21 @ 17:00)  Wt(kg): --    02-28 @ 07:01  -  03-01 @ 07:00  --------------------------------------------------------  IN: 464 mL / OUT: 575 mL / NET: -111 mL    03-01 @ 07:01  -  03-02 @ 07:00  --------------------------------------------------------  IN: 439 mL / OUT: 1000 mL / NET: -561 mL    03-02 @ 07:01  -  03-02 @ 17:23  --------------------------------------------------------  IN: 976 mL / OUT: 200 mL / NET: 776 mL          LABS:  03-02    143  |  106  |  68<HH>  ----------------------------<  193<H>  5.2<H>   |  23  |  1.7<H>    Ca    8.8      02 Mar 2021 04:30  Mg     3.2     03-02    TPro  6.0  /  Alb  2.6<L>  /  TBili  0.6  /  DBili      /  AST  30  /  ALT  24  /  AlkPhos  107  03-02                          15.9   21.04 )-----------( 289      ( 02 Mar 2021 04:30 )             48.3       Urine Studies:        RADIOLOGY & ADDITIONAL STUDIES:

## 2021-03-02 NOTE — PROGRESS NOTE ADULT - SUBJECTIVE AND OBJECTIVE BOX
ELIZA POWELL  76y Male    CHIEF COMPLAINT:    Patient is a 76y old  Male who presents with a chief complaint of atrial fibrillation (2021 19:27)      INTERVAL HPI/OVERNIGHT EVENTS:    Patient seen and examined. No acute events overnight. currently on HFNC with NRB    ROS: All other systems are negative.    Vital Signs:    T(F): 97 (21 @ 13:06), Max: 98 (21 @ 04:00)  HR: 84 (21 @ 13:06) (84 - 143)  BP: 114/78 (21 @ 13:06) (95/70 - 137/85)  RR: 24 (21 @ 13:06) (18 - 28)  SpO2: 93% (21 @ 13:06) (87% - 100%)    01 Mar 2021 07:01  -  02 Mar 2021 07:00  --------------------------------------------------------  IN: 439 mL / OUT: 1000 mL / NET: -561 mL    02 Mar 2021 07:01  -  02 Mar 2021 14:07  --------------------------------------------------------  IN: 852 mL / OUT: 200 mL / NET: 652 mL    Daily Weight in k.8 (02 Mar 2021 06:00)    POCT Blood Glucose.: 120 mg/dL (02 Mar 2021 10:55)  POCT Blood Glucose.: 195 mg/dL (02 Mar 2021 07:28)  POCT Blood Glucose.: 196 mg/dL (01 Mar 2021 20:27)  POCT Blood Glucose.: 248 mg/dL (01 Mar 2021 16:46)  POCT Blood Glucose.: 154 mg/dL (01 Mar 2021 14:58)    PHYSICAL EXAM:    GENERAL:  NAD  SKIN: No rashes or lesions  HEENT: Atraumatic. Normocephalic.   NECK: Supple, No JVD.   PULMONARY: poor inspiratory effort. No wheezing. No rales  CVS: Normal S1, S2. Rate and Rhythm are regular.  ABDOMEN/GI: Soft, Nontender, Nondistended; BS present  MSK: no clubbing or cyanosis  NEUROLOGIC:  moves all extremities  PSYCH: Alert & oriented x 3, normal affect    Consultant(s) Notes Reviewed:  [x ] YES  [ ] NO  Care Discussed with Consultants/Other Providers [ x] YES  [ ] NO    LABS:                        15.9   21.04 )-----------( 289      ( 02 Mar 2021 04:30 )             48.3     143  |  106  |  68<HH>  ----------------------------<  193<H>  5.2<H>   |  23  |  1.7<H>    Ca    8.8      02 Mar 2021 04:30  Mg     3.2     03-02    TPro  6.0  /  Alb  2.6<L>  /  TBili  0.6  /  DBili  x   /  AST  30  /  ALT  24  /  AlkPhos  107  03-02    PTT - ( 02 Mar 2021 04:30 )  PTT:74.8 sec    RADIOLOGY & ADDITIONAL TESTS:  Imaging or report Personally Reviewed:  [x] YES  [ ] NO  EKG reviewed: [x] YES  [ ] NO    Medications:  Standing  aMIOdarone    Tablet 200 milliGRAM(s) Oral every 12 hours  diltiazem Infusion 10 mG/Hr IV Continuous <Continuous>  heparin  Infusion 1100 Unit(s)/Hr IV Continuous <Continuous>  influenza   Vaccine 0.5 milliLiter(s) IntraMuscular once  insulin glargine Injectable (LANTUS) 30 Unit(s) SubCutaneous at bedtime  insulin lispro (ADMELOG) corrective regimen sliding scale   SubCutaneous three times a day before meals  insulin lispro (ADMELOG) corrective regimen sliding scale   SubCutaneous at bedtime  insulin lispro Injectable (ADMELOG) 12 Unit(s) SubCutaneous three times a day before meals  metoprolol tartrate 50 milliGRAM(s) Oral two times a day  pantoprazole    Tablet 40 milliGRAM(s) Oral before breakfast  simvastatin 20 milliGRAM(s) Oral at bedtime  tamsulosin 0.4 milliGRAM(s) Oral at bedtime    PRN Meds  acetaminophen   Tablet .. 650 milliGRAM(s) Oral every 4 hours PRN  sodium chloride 0.65% Nasal 1 Spray(s) Both Nostrils two times a day PRN

## 2021-03-02 NOTE — PROGRESS NOTE ADULT - SUBJECTIVE AND OBJECTIVE BOX
SUBJECTIVE:    Patient is a 76y old Male who presents with a chief complaint of atrial fibrillation (26 Feb 2021 19:27)    Currently admitted to medicine with the primary diagnosis of COVID-19       Today is hospital day 15d. This morning he is resting comfortably in bed and reports no new issues or overnight events. Complains of some SOB. Changed to HFNC this AM. No fevers, chills. Denies CP.    PAST MEDICAL & SURGICAL HISTORY  Gastroesophageal reflux disease    Enlarged liver    BPH (benign prostatic hyperplasia)    Essential hypertension    Heart valve disorder    History of back surgery      SOCIAL HISTORY:  Negative for smoking/alcohol/drug use.     ALLERGIES:  No Known Allergies    MEDICATIONS:  STANDING MEDICATIONS  aMIOdarone    Tablet 200 milliGRAM(s) Oral every 12 hours  diltiazem Infusion 10 mG/Hr IV Continuous <Continuous>  heparin  Infusion 1100 Unit(s)/Hr IV Continuous <Continuous>  influenza   Vaccine 0.5 milliLiter(s) IntraMuscular once  insulin glargine Injectable (LANTUS) 30 Unit(s) SubCutaneous at bedtime  insulin lispro (ADMELOG) corrective regimen sliding scale   SubCutaneous three times a day before meals  insulin lispro (ADMELOG) corrective regimen sliding scale   SubCutaneous at bedtime  insulin lispro Injectable (ADMELOG) 12 Unit(s) SubCutaneous three times a day before meals  metoprolol tartrate 50 milliGRAM(s) Oral two times a day  pantoprazole    Tablet 40 milliGRAM(s) Oral before breakfast  simvastatin 20 milliGRAM(s) Oral at bedtime  tamsulosin 0.4 milliGRAM(s) Oral at bedtime    PRN MEDICATIONS  acetaminophen   Tablet .. 650 milliGRAM(s) Oral every 4 hours PRN  sodium chloride 0.65% Nasal 1 Spray(s) Both Nostrils two times a day PRN    VITALS:   T(F): 96.4  HR: 92  BP: 113/68  RR: 22  SpO2: 92%    LABS:                        15.8   19.57 )-----------( 233      ( 01 Mar 2021 07:54 )             47.6     03-01    143  |  105  |  72<HH>  ----------------------------<  157<H>  5.4<H>   |  23  |  1.6<H>    Ca    8.6      01 Mar 2021 07:54  Mg     3.4     03-01    TPro  5.9<L>  /  Alb  2.6<L>  /  TBili  0.6  /  DBili  x   /  AST  33  /  ALT  24  /  AlkPhos  123<H>  03-01    PT/INR - ( 28 Feb 2021 08:15 )   PT: 13.80 sec;   INR: 1.20 ratio         PTT - ( 01 Mar 2021 07:54 )  PTT:62.7 sec    RADIOLOGY:  < from: Xray Chest 1 View- PORTABLE-Routine (Xray Chest 1 View- PORTABLE-Routine in AM.) (03.01.21 @ 06:29) >  Impression:  Increased bilateral opacities. No definite pneumothorax.  < end of copied text >    PHYSICAL EXAM:  GEN: NAD, lying in bed  HEENT: EOMI, PERRLA, conjunctiva and sclera clear. MMM.  LUNGS: Clear to auscultation bilaterally. No rales, rhonchi, or wheezing appreciated. On HFNC  HEART: S1/S2 present. IRR.   ABD: Soft, non-tender, non-distended. Bowel sounds present.  EXT: 2+ peripheral pulses. No clubbing, cyanosis, or edema.   NEURO: AAOX3. Speech clear. No focal neurological deficits. Sensation grossly intact.   Skin: No rashes or lesions.

## 2021-03-02 NOTE — PROGRESS NOTE ADULT - ASSESSMENT
77 yo M with PMx Chronic Afib on Coumadin, HTN, DLD, DM II,  with recent positive COVID test on 02/11 who presented with 1 week of SOB on 02/12, transferred to [facility] due to acute hypoxic respiratory failure due to COVID PNA, complicated by DVT and possible PE    Acute Hypoxemic Respiratory Failure Secondary to COVID Pneumonia  Provoked DVT/Possible PE  on HFNC 60/100 with NRB, saturating low 90s  alternate with bipap  s/p RDV 2/13-2/17, s/p 1u convalescent plasma 2/13  s/p 10 days course of decadron  ID on board  taper o2 as tolerated  trend inflammatory markers   LE duplex (02/19): left common femoral, femoral, popliteal DVT, left gastrocnemius, right small superficial thrombosis  continue with Heparin Gtt for  now, monitor PTTs  hold off on CTA at this time given PRAVIN    PRAVIN, likely prerenal   Hyperkalemia  Scr 1.7 today, baseline 1.3  Lasix on hold for now, off IVF  monitor daily BMP  monitor I&O, Lokelma 10g PRN for K>5.5  renal eval Dr Grady following    Chronic Atrial Fibrillation   Hypertension  HR better controlled  continue Metoprolol  IV, and cardizem GTT for now, continue with heparin GTT  Will change back to PO if able to tolerate HFNC for a few hours  case discussed with heme/onc, given extensive DVTs while on coumadin, only start bridging to coumadin once Covid infection resolves     DM II  Hba1c 11  continue with Lantus 30 and Lispro 12 AC with ISS  monitor FS    Patient requires continuous monitoring in [facility]  Poor prognosis

## 2021-03-02 NOTE — PROGRESS NOTE ADULT - ASSESSMENT
Pt is a 76 year old M with recent positive COVID test on 02/11 who presented with 1 week of SOB on 02/12, found to have COVID pneumonia, admitted on 02/12 for hypoxic RF secondary to COVID pneumonia with stay complicated by acute DVT and possible PE. Currently hemodynamically stable.    #Acute Hypoxic Respiratory Failure Secondary to COVID Pneumonia  Recent COVID positive test on 02/11. SARS-COV2: positive 02/12. Chest X Ray: 02/12 bilateral opacities  - Infectious Disease and Pulmonary team are on board  - Monitor for fever: afebrile in last 24 hours. Tylenol PRN for fever  - Trend WBC: 02/22 20.46 -> 02/24 20.72 -> 02/26 20.4 -> 20.76 02/27 -> 19.5 3/1 -> __ 3/2  - Monitor SaO2 and Oxygen Requirements: Requiring BIPAP continuously yesterday. NIV AVAPs EPAP 10 FiO2 100%. May need intubation if cannot tolerated off BIPAP. Currently on trial of HFNC this AM.   - Chest X Ray 3/1: Increased bilateral opacities. No definite pneumothorax  - Trend Inflammatory Markers:  --> D-dimer 68 02/12 -> 2959 02/22 -> 3921 02/24 -> 02/26 2766 -> 02/28 2300  --> Procalcitonin 0.06 02/12 -> 0.24 (02/20) -> 0.28 02/21 -> 02/22 00.18 -> 02/24 0.12 -> 02/26 0.08-> 02/28 0.07  --> C-reactive protein 02/12 8.53 -> 02/17 5.99 -> 02/18 9.66 -> 13.5 02/21 -> 02/22 6.78 -> 02/24 2.45 -> 02/26 0.82-> 02/28 51  --> Ferritin 02/12 982 -> 1137 (02/13) -> 1246 (02/15) -> 1231 (02/17) -> 02/22 2398 -> 02/25 2995 -> 02/28 pend  --> LDH 02/12 263 -> 442 (02/16)  - Follow up blood cultures 02/24: negative   - COVID therapy:  --> S/P 1 unit of convalescent plasma 02/13  --> s/p IV Remdesevir: S/P 100mg QD from 02/13-02/17  --> s/p 10-day course of IV Dexamethasone 6mg QD from 02/15-02/24  - Therapeutic Anticoagulation Heparin Drip (details under Acute DVT and possible PE)    #Provoked DVT  #Possible PE  Duplex Venous (02/19): left common femoral, femoral, popliteal DVT, left gastrocnemius, right small superficial thrombosis. Home med: coumadin for afib. D-dimer following. ED 02/12 INR 1.83 s/p Vitamin K x2 doses  - Heme onc on board for supra therapeutic INR in ED and presumed PE/ DIC: will consider bridging to coumadin (plus Aspirin 81mg QD per Dr Carl) once clinically improves and COVID resolves. Last coumadin dose 3mg x1 02/14.   - Continue Heparin Drip for now: currently at rate of 11ml/hour. FU aPTT qD    #Pre-Renal PRAVIN Secondary to reduced PO intake on NR and possibly the IV Lasix dose of 40mg on 02/20  Possibly Secondary to reduced PO intake on NR and possibly the IV Lasix dose of 40mg on 02/20. Baseline Cr 1.1 -> 02/20 BUN 84, Cr 2.0.   - 02/21 and 02/22 Cr 2.7  - Nephrology Dr Grady consulted on 02/25: monitor off IV fluids, s/p 40mg IV lasix, avoid zaragoza, avoid CT contrast for risk of contrast-induced nephropathy  - Monitor BUN and Cr: 02/25 Cr 2.2 -> 02/26 BUN 14, Cr 1.7 -> 02/27 Cr 1.5 > 3/1 Cr 1.6 > 3/2 Cr __   - Continue holding PO Lasix 40mg QD since 02/24   - Monitor I/O    #Chronic Atrial Fibrillation  Rate uncontrolled possibly 2ry to ARF. Home coumadin 3mg QD, Cardizem 240mg QD. ECG 02/13 afib with RVR  - S/P IV Digoxin 0.25mg x2 doses on 02/22. Digoxin level 02/23: 0.3  - Cardiology Dr Carl on board: last on 02/21  - Continue PO Lopressor 5mg BID (Switched from IV Lopressor 5mg Q6h on 02/25)  - Continue PO Amiodarone 200mg BID (Switched from Amio drip at 16.7ml/hour on 02/25)   - Started PO Cardizem 60mg Q8h on 02/26 and discontinued Cardizem drip on 02/27  - If HR remains controlled with above-mentioned plan, will consider discontinuing PO Amiodarone and keeping only: PO Lopressor 5mg BID and PO Cardizem 60mg Q8h per Dr Carl  - For AC: heparin drip as above  - Keep K>4, Mg>2    #hyperkalemia  K+ 5.6 2/28 > 5.4 3/1  -s/p 10g lokelma  -K+ __ 3/2     #Hypertension  Home meds: Losartan 50mg BID, Cardizem 240mg QD, and Lasix 40mg QD.   - Monitor BP: 02/28 100/65, 118/62 mmHg  - Holding Lasix PO 40mg QD starting 02/24 for pre-renal PRAVIN  - Continue PO Lopressor 5mg BID (Switched from IV Lopressor 5mg Q6h on 02/25)  - Continue PO Amiodarone 200mg BID (Switched from Amio drip at 16.7ml/hour on 02/25)   - Started PO Cardizem 60mg Q8h on 02/26 and discontinued Cardizem drip on 02/27    #DM II  A1c 02/13 11. Home metformin 500mg BID  - Monitor POCT   - Continue Lantus 30u, Lispro 12 u TID, and Scale B    #GERD  Home Omeprazole 20mg QD  - c/w protonix 40mg QD PO    #HLD  No lipid profile. Home simvastatin 20mg QD  - Continue Simvastatin 20mg QD    DVT ppx: heparin drip  GI ppx: pantoprazole 40mg qD  Labs: CMP+Mg, CBC   Diet: NPO on BIPAP > get nutrition c/s for PPN/TPN if cannot tolerated off BIPAP  Dispo: pending clinical improvement  FULL CODE per [facility] 2/24   Pt is a 76 year old M with recent positive COVID test on 02/11 who presented with 1 week of SOB on 02/12, found to have COVID pneumonia, admitted on 02/12 for hypoxic RF secondary to COVID pneumonia with stay complicated by acute DVT and possible PE. Currently hemodynamically stable.    #Acute Hypoxic Respiratory Failure Secondary to COVID Pneumonia  Recent COVID positive test on 02/11. SARS-COV2: positive 02/12. Chest X Ray: 02/12 bilateral opacities  - Infectious Disease and Pulmonary team are on board  - Monitor for fever: afebrile in last 24 hours. Tylenol PRN for fever  - Trend WBC: 02/22 20.46 -> 02/24 20.72 -> 02/26 20.4 -> 20.76 02/27 -> 19.5 3/1 -> 21 3/2  - Monitor SaO2 and Oxygen Requirements: Requiring BIPAP continuously yesterday. NIV AVAPs EPAP 10 FiO2 100%. May need intubation if cannot tolerated off BIPAP. Currently on trial of HFNC this AM.   - Chest X Ray 3/1: Increased bilateral opacities. No definite pneumothorax  - Trend Inflammatory Markers:  --> D-dimer 68 02/12 -> 2959 02/22 -> 3921 02/24 -> 02/26 2766 -> 02/28 2300  --> Procalcitonin 0.06 02/12 -> 0.24 (02/20) -> 0.28 02/21 -> 02/22 00.18 -> 02/24 0.12 -> 02/26 0.08-> 02/28 0.07  --> C-reactive protein 02/12 8.53 -> 02/17 5.99 -> 02/18 9.66 -> 13.5 02/21 -> 02/22 6.78 -> 02/24 2.45 -> 02/26 0.82-> 02/28 51  --> Ferritin 02/12 982 -> 1137 (02/13) -> 1246 (02/15) -> 1231 (02/17) -> 02/22 2398 -> 02/25 2995 -> 02/28 pend  --> LDH 02/12 263 -> 442 (02/16)  - Follow up blood cultures 02/24: negative   - COVID therapy:  --> S/P 1 unit of convalescent plasma 02/13  --> s/p IV Remdesevir: S/P 100mg QD from 02/13-02/17  --> s/p 10-day course of IV Dexamethasone 6mg QD from 02/15-02/24  - Therapeutic Anticoagulation Heparin Drip (details under Acute DVT and possible PE)    #Provoked DVT  #Possible PE  Duplex Venous (02/19): left common femoral, femoral, popliteal DVT, left gastrocnemius, right small superficial thrombosis. Home med: coumadin for afib. D-dimer following. ED 02/12 INR 1.83 s/p Vitamin K x2 doses  - Heme onc on board for supra therapeutic INR in ED and presumed PE/ DIC: will consider bridging to coumadin (plus Aspirin 81mg QD per Dr Carl) once clinically improves and COVID resolves. Last coumadin dose 3mg x1 02/14.   - Continue Heparin Drip for now: currently at rate of 11ml/hour. FU aPTT qD    #Pre-Renal PRAVIN Secondary to reduced PO intake on NR and possibly the IV Lasix dose of 40mg on 02/20  Possibly Secondary to reduced PO intake on NR and possibly the IV Lasix dose of 40mg on 02/20. Baseline Cr 1.1 -> 02/20 BUN 84, Cr 2.0.   - 02/21 and 02/22 Cr 2.7  - Nephrology Dr Grady consulted on 02/25: monitor off IV fluids, s/p 40mg IV lasix, avoid zaragoza, avoid CT contrast for risk of contrast-induced nephropathy  - Monitor BUN and Cr: 02/25 Cr 2.2 -> 02/26 BUN 14, Cr 1.7 -> 02/27 Cr 1.5 > 3/1 Cr 1.6 > 3/2 Cr __   - Continue holding PO Lasix 40mg QD since 02/24   - Monitor I/O    #Chronic Atrial Fibrillation  Rate uncontrolled possibly 2ry to ARF. Home coumadin 3mg QD, Cardizem 240mg QD. ECG 02/13 afib with RVR  - S/P IV Digoxin 0.25mg x2 doses on 02/22. Digoxin level 02/23: 0.3  - Cardiology Dr Carl on board: last on 02/21  - Continue PO Lopressor 5mg BID (Switched from IV Lopressor 5mg Q6h on 02/25)  - Continue PO Amiodarone 200mg BID (Switched from Amio drip at 16.7ml/hour on 02/25)   - Started PO Cardizem 60mg Q8h on 02/26 and discontinued Cardizem drip on 02/27  - If HR remains controlled with above-mentioned plan, will consider discontinuing PO Amiodarone and keeping only: PO Lopressor 5mg BID and PO Cardizem 60mg Q8h per Dr Carl  - For AC: heparin drip as above  - Keep K>4, Mg>2    #hyperkalemia  K+ 5.6 2/28 > 5.4 3/1  -s/p 10g lokelma  -K+ __ 3/2     #Hypertension  Home meds: Losartan 50mg BID, Cardizem 240mg QD, and Lasix 40mg QD.   - Monitor BP: 02/28 100/65, 118/62 mmHg  - Holding Lasix PO 40mg QD starting 02/24 for pre-renal PRAVIN  - Continue PO Lopressor 5mg BID (Switched from IV Lopressor 5mg Q6h on 02/25)  - Continue PO Amiodarone 200mg BID (Switched from Amio drip at 16.7ml/hour on 02/25)   - Started PO Cardizem 60mg Q8h on 02/26 and discontinued Cardizem drip on 02/27    #DM II  A1c 02/13 11. Home metformin 500mg BID  - Monitor POCT   - Continue Lantus 30u, Lispro 12 u TID, and Scale B    #GERD  Home Omeprazole 20mg QD  - c/w protonix 40mg QD PO    #HLD  No lipid profile. Home simvastatin 20mg QD  - Continue Simvastatin 20mg QD    DVT ppx: heparin drip  GI ppx: pantoprazole 40mg qD  Labs: CMP+Mg, CBC   Diet: NPO on BIPAP > get nutrition c/s for PPN/TPN if cannot tolerated off BIPAP  Dispo: pending clinical improvement  FULL CODE per [facility] 2/24   Pt is a 76 year old M with recent positive COVID test on 02/11 who presented with 1 week of SOB on 02/12, found to have COVID pneumonia, admitted on 02/12 for hypoxic RF secondary to COVID pneumonia with stay complicated by acute DVT and possible PE. Currently hemodynamically stable.    #Acute Hypoxic Respiratory Failure Secondary to COVID Pneumonia  Recent COVID positive test on 02/11. SARS-COV2: positive 02/12. Chest X Ray: 02/12 bilateral opacities  - Infectious Disease and Pulmonary team are on board  - Monitor for fever: afebrile in last 24 hours. Tylenol PRN for fever  - Trend WBC: 02/22 20.46 -> 02/24 20.72 -> 02/26 20.4 -> 20.76 02/27 -> 19.5 3/1 -> 21 3/2  - Monitor SaO2 and Oxygen Requirements: Requiring BIPAP continuously yesterday. NIV AVAPs EPAP 10 FiO2 100%. May need intubation if cannot tolerated off BIPAP. Currently on trial of HFNC this AM.   - Chest X Ray 3/1: Increased bilateral opacities. No definite pneumothorax  - Trend Inflammatory Markers:  --> D-dimer 68 02/12 -> 2959 02/22 -> 3921 02/24 -> 02/26 2766 -> 02/28 2300  --> Procalcitonin 0.06 02/12 -> 0.24 (02/20) -> 0.28 02/21 -> 02/22 00.18 -> 02/24 0.12 -> 02/26 0.08-> 02/28 0.07  --> C-reactive protein 02/12 8.53 -> 02/17 5.99 -> 02/18 9.66 -> 13.5 02/21 -> 02/22 6.78 -> 02/24 2.45 -> 02/26 0.82-> 02/28 51  --> Ferritin 02/12 982 -> 1137 (02/13) -> 1246 (02/15) -> 1231 (02/17) -> 02/22 2398 -> 02/25 2995 -> 02/28 pend  --> LDH 02/12 263 -> 442 (02/16)  - Follow up blood cultures 02/24: negative   - COVID therapy:  --> S/P 1 unit of convalescent plasma 02/13  --> s/p IV Remdesevir: S/P 100mg QD from 02/13-02/17  --> s/p 10-day course of IV Dexamethasone 6mg QD from 02/15-02/24  - Therapeutic Anticoagulation Heparin Drip (details under Acute DVT and possible PE)    #Provoked DVT  #Possible PE  Duplex Venous (02/19): left common femoral, femoral, popliteal DVT, left gastrocnemius, right small superficial thrombosis. Home med: coumadin for afib. D-dimer following. ED 02/12 INR 1.83 s/p Vitamin K x2 doses  - Heme onc on board for supra therapeutic INR in ED and presumed PE/ DIC: will consider bridging to coumadin (plus Aspirin 81mg QD per Dr Carl) once clinically improves and COVID resolves. Last coumadin dose 3mg x1 02/14.   - Continue Heparin Drip for now: currently at rate of 11ml/hour. FU aPTT qD    #Pre-Renal PRAVIN Secondary to reduced PO intake on NR and possibly the IV Lasix dose of 40mg on 02/20  Possibly Secondary to reduced PO intake on NR and possibly the IV Lasix dose of 40mg on 02/20. Baseline Cr 1.1 -> 02/20 BUN 84, Cr 2.0.   - 02/21 and 02/22 Cr 2.7  - Nephrology Dr Grady consulted on 02/25: monitor off IV fluids, s/p 40mg IV lasix, avoid zaragoza, avoid CT contrast for risk of contrast-induced nephropathy  - Monitor BUN and Cr: 02/25 Cr 2.2 -> 02/26 BUN 14, Cr 1.7 -> 02/27 Cr 1.5 > 3/1 Cr 1.6 > 3/2 Cr 1.7  - Continue holding PO Lasix 40mg QD since 02/24   - Monitor I/O    #Chronic Atrial Fibrillation  Rate uncontrolled possibly 2ry to ARF. Home coumadin 3mg QD, Cardizem 240mg QD. ECG 02/13 afib with RVR  - S/P IV Digoxin 0.25mg x2 doses on 02/22. Digoxin level 02/23: 0.3  - Cardiology Dr Carl on board: last on 02/21  - Continue PO Lopressor 5mg BID (Switched from IV Lopressor 5mg Q6h on 02/25)  - Continue PO Amiodarone 200mg BID (Switched from Amio drip at 16.7ml/hour on 02/25)   - Started PO Cardizem 60mg Q8h on 02/26 and discontinued Cardizem drip on 02/27  - If HR remains controlled with above-mentioned plan, will consider discontinuing PO Amiodarone and keeping only: PO Lopressor 5mg BID and PO Cardizem 60mg Q8h per Dr Carl  - For AC: heparin drip as above  - Keep K>4, Mg>2    #hyperkalemia  K+ 5.6 2/28 > 5.4 3/1  -s/p 10g lokelma  -K+ 5.2 3/2     #Hypertension  Home meds: Losartan 50mg BID, Cardizem 240mg QD, and Lasix 40mg QD.   - Monitor BP: 02/28 100/65, 118/62 mmHg  - Holding Lasix PO 40mg QD starting 02/24 for pre-renal PRAVIN  - Continue PO Lopressor 5mg BID (Switched from IV Lopressor 5mg Q6h on 02/25)  - Continue PO Amiodarone 200mg BID (Switched from Amio drip at 16.7ml/hour on 02/25)   - Started PO Cardizem 60mg Q8h on 02/26 and discontinued Cardizem drip on 02/27    #DM II  A1c 02/13 11. Home metformin 500mg BID  - Monitor POCT   - Continue Lantus 30u, Lispro 12 u TID, and Scale B    #GERD  Home Omeprazole 20mg QD  - c/w protonix 40mg QD PO    #HLD  No lipid profile. Home simvastatin 20mg QD  - Continue Simvastatin 20mg QD    DVT ppx: heparin drip  GI ppx: pantoprazole 40mg qD  Labs: CMP+Mg, CBC   Diet: Dysphagia 3 now off BIPAP, get nutrition c/s for PPN/TPN if cannot tolerated off BIPAP  Dispo: pending clinical improvement  FULL CODE per [facility] 2/24

## 2021-03-03 NOTE — PROGRESS NOTE ADULT - SUBJECTIVE AND OBJECTIVE BOX
ELIZA POWELL  76y, Male  Allergy: No Known Allergies    Hospital Day: 16d    Patient seen and examined earlier today. No acute events overnight.    PMH/PSH:  PAST MEDICAL & SURGICAL HISTORY:  Gastroesophageal reflux disease    Enlarged liver    BPH (benign prostatic hyperplasia)    Essential hypertension    Heart valve disorder    History of back surgery    VITALS:  T(F): 97.9 (03-03-21 @ 12:05), Max: 98.4 (03-03-21 @ 04:00)  HR: 80 (03-03-21 @ 12:05)  BP: 126/77 (03-03-21 @ 12:05) (107/72 - 127/79)  RR: 22 (03-03-21 @ 12:05)  SpO2: 98% (03-03-21 @ 09:00)    TESTS & MEASUREMENTS:  Weight (Kg):     03-01-21 @ 07:01  -  03-02-21 @ 07:00  --------------------------------------------------------  IN: 439 mL / OUT: 1000 mL / NET: -561 mL    03-02-21 @ 07:01  -  03-03-21 @ 07:00  --------------------------------------------------------  IN: 1225 mL / OUT: 300 mL / NET: 925 mL                        16.0   21.87 )-----------( 302      ( 03 Mar 2021 07:46 )             48.2     PTT - ( 03 Mar 2021 07:46 )  PTT:86.6 sec  03-03    142  |  103  |  68<HH>  ----------------------------<  204<H>  5.9<H>   |  22  |  1.7<H>    Ca    8.6      03 Mar 2021 07:46  Mg     3.2     03-03    TPro  6.1  /  Alb  2.6<L>  /  TBili  0.6  /  DBili  x   /  AST  37  /  ALT  22  /  AlkPhos  114  03-03    LIVER FUNCTIONS - ( 03 Mar 2021 07:46 )  Alb: 2.6 g/dL / Pro: 6.1 g/dL / ALK PHOS: 114 U/L / ALT: 22 U/L / AST: 37 U/L / GGT: x           RECENT DIAGNOSTIC ORDERS:  12 Lead ECG (03-03-21 @ 10:26)  Ferritin, Serum: AM Sched. Collection: 04-Mar-2021 04:30 (03-03-21 @ 08:24)  D-Dimer Assay, Quantitative:  Start Date:03-Mar-2021. AM Sched. Collection:04-Mar-2021 04:30 (03-03-21 @ 08:24)  Procalcitonin, Serum: AM Sched. Collection: 04-Mar-2021 04:30 (03-03-21 @ 08:24)  C-Reactive Protein, Serum: AM Sched. Collection: 04-Mar-2021 04:30 (03-03-21 @ 08:24)  Xray Chest 1 View- PORTABLE-Routine: AM   Indication: covid pna  Transport: Portable,  w/ Monitor,  w/ IV Pole,  w/ O2 (03-03-21 @ 08:24)  Magnesium, Serum: AM Sched. Collection: 04-Mar-2021 04:30 (03-03-21 @ 08:24)  Complete Blood Count + Automated Diff: AM Sched. Collection: 04-Mar-2021 04:30 (03-03-21 @ 08:24)  Comprehensive Metabolic Panel: AM Sched. Collection: 04-Mar-2021 04:30 (03-03-21 @ 08:24)  Activated Partial Thromboplastin Time:  Start Date:03-Mar-2021. AM Sched. Collection:04-Mar-2021 04:30 (03-03-21 @ 08:24)    MEDICATIONS:  MEDICATIONS  (STANDING):  aMIOdarone Infusion 0.5 mG/Min (16.7 mL/Hr) IV Continuous <Continuous>  diltiazem Infusion 20 mG/Hr (20 mL/Hr) IV Continuous <Continuous>  heparin  Infusion 1100 Unit(s)/Hr (11 mL/Hr) IV Continuous <Continuous>  influenza   Vaccine 0.5 milliLiter(s) IntraMuscular once  insulin glargine Injectable (LANTUS) 30 Unit(s) SubCutaneous at bedtime  insulin lispro (ADMELOG) corrective regimen sliding scale   SubCutaneous three times a day before meals  insulin lispro (ADMELOG) corrective regimen sliding scale   SubCutaneous at bedtime  insulin lispro Injectable (ADMELOG) 12 Unit(s) SubCutaneous three times a day before meals  metoprolol tartrate 50 milliGRAM(s) Oral two times a day  pantoprazole    Tablet 40 milliGRAM(s) Oral before breakfast  simvastatin 20 milliGRAM(s) Oral at bedtime  tamsulosin 0.4 milliGRAM(s) Oral at bedtime    MEDICATIONS  (PRN):  acetaminophen   Tablet .. 650 milliGRAM(s) Oral every 4 hours PRN Temp greater or equal to 38.5C (101.3F)  BACItracin   Ointment 1 Application(s) Topical daily PRN dry skin  sodium chloride 0.65% Nasal 1 Spray(s) Both Nostrils two times a day PRN Nasal Congestion    HOME MEDICATIONS:  Coumadin 3 mg oral tablet (02-12)  dilTIAZem 240 mg/24 hours oral capsule, extended release (02-12)  furosemide 40 mg oral tablet (02-12)  losartan 50 mg oral tablet (02-12)  metFORMIN 500 mg oral tablet (02-12)  metoprolol succinate 50 mg oral tablet, extended release (02-12)  omeprazole 20 mg oral delayed release capsule (02-12)  simvastatin 20 mg oral tablet (02-12)  tamsulosin 0.4 mg oral capsule (02-12)      REVIEW OF SYSTEMS:  All other review of systems is negative unless indicated above.     PHYSICAL EXAM:  GENERAL: NAD  HEENT: No Swelling  CHEST/LUNG: Good air entry, No wheezing  HEART: RRR, No murmurs  ABDOMEN: Soft, Bowel sounds present  EXTREMITIES:  No clubbing

## 2021-03-03 NOTE — PROGRESS NOTE ADULT - SUBJECTIVE AND OBJECTIVE BOX
SUBJECTIVE:    Patient is a 76y old Male who presents with a chief complaint of atrial fibrillation (26 Feb 2021 19:27)    Currently admitted to medicine with the primary diagnosis of COVID-19     Today is hospital day 16d. This morning he is resting comfortably in bed and reports no new issues or overnight events. Denies SOB this AM. Changed to HFNC yesterday. Placed on BIPAP overnight.     PAST MEDICAL & SURGICAL HISTORY  Gastroesophageal reflux disease    Enlarged liver    BPH (benign prostatic hyperplasia)    Essential hypertension    Heart valve disorder    History of back surgery      SOCIAL HISTORY:  Negative for smoking/alcohol/drug use.     ALLERGIES:  No Known Allergies    MEDICATIONS:  STANDING MEDICATIONS  aMIOdarone    Tablet 200 milliGRAM(s) Oral every 12 hours  diltiazem Infusion 20 mG/Hr IV Continuous <Continuous>  heparin  Infusion 1100 Unit(s)/Hr IV Continuous <Continuous>  influenza   Vaccine 0.5 milliLiter(s) IntraMuscular once  insulin glargine Injectable (LANTUS) 30 Unit(s) SubCutaneous at bedtime  insulin lispro (ADMELOG) corrective regimen sliding scale   SubCutaneous three times a day before meals  insulin lispro (ADMELOG) corrective regimen sliding scale   SubCutaneous at bedtime  insulin lispro Injectable (ADMELOG) 12 Unit(s) SubCutaneous three times a day before meals  metoprolol tartrate 50 milliGRAM(s) Oral two times a day  pantoprazole    Tablet 40 milliGRAM(s) Oral before breakfast  simvastatin 20 milliGRAM(s) Oral at bedtime  tamsulosin 0.4 milliGRAM(s) Oral at bedtime    PRN MEDICATIONS  acetaminophen   Tablet .. 650 milliGRAM(s) Oral every 4 hours PRN  BACItracin   Ointment 1 Application(s) Topical daily PRN  sodium chloride 0.65% Nasal 1 Spray(s) Both Nostrils two times a day PRN    VITALS:   T(F): 98.4  HR: 75  BP: 114/68  RR: 40  SpO2: 98%    LABS:                        15.9   21.04 )-----------( 289      ( 02 Mar 2021 04:30 )             48.3     03-02    143  |  106  |  68<HH>  ----------------------------<  193<H>  5.2<H>   |  23  |  1.7<H>    Ca    8.8      02 Mar 2021 04:30  Mg     3.2     03-02    TPro  6.0  /  Alb  2.6<L>  /  TBili  0.6  /  DBili  x   /  AST  30  /  ALT  24  /  AlkPhos  107  03-02    PTT - ( 02 Mar 2021 04:30 )  PTT:74.8 sec    RADIOLOGY:  < from: Xray Chest 1 View- PORTABLE-Routine (Xray Chest 1 View- PORTABLE-Routine in AM.) (03.02.21 @ 06:45) >  IMPRESSION:  Unchanged bilateral opacities.  < end of copied text >    PHYSICAL EXAM:  GEN: NAD, lying in bed comfortably   HEENT: EOMI, PERRLA, conjunctiva and sclera clear. MMM.  LUNGS: Clear to auscultation bilaterally. No rales, rhonchi, or wheezing appreciated. On NIV  HEART: S1/S2 present. IRR.   ABD: Soft, non-tender, non-distended. Bowel sounds present.  EXT: 2+ peripheral pulses. No clubbing, cyanosis, or edema.   NEURO: AAOX3. Speech clear. No focal neurological deficits. Sensation grossly intact.   Skin: No rashes or lesions.

## 2021-03-03 NOTE — PROGRESS NOTE ADULT - ASSESSMENT
PRAVIN  hyperkalemia  ARF due to COVID-19 PNA  valvular heart disease / afib  HTN  new DVT / possible PE  BPH    plan:    lasix 20mg iv x 1  low K+ diet   lokelma 10g PRN if able to take po   cont flomax  cardizem gtt / po amiodarone  heparin gtt  lantus / humalog   f/u labs  d/w resident and team  icu care

## 2021-03-03 NOTE — PROGRESS NOTE ADULT - ASSESSMENT
Pt is a 76 year old M with recent positive COVID test on 02/11 who presented with 1 week of SOB on 02/12, found to have COVID pneumonia, admitted on 02/12 for hypoxic RF secondary to COVID pneumonia with stay complicated by acute DVT and possible PE. Currently hemodynamically stable.    #Acute Hypoxic Respiratory Failure  #Covid 19 PNA  Currently on AVAPs, saturating %  s/p 1 unit of convalescent plasma 02/13  s/p IV Remdesevir: S/P 100mg QD from 02/13-02/17  s/p 10-day course of IV Dexamethasone 6mg QD from 02/15-02/24  LE duplex (02/19): left common femoral, femoral, popliteal DVT, left gastrocnemius, right small superficial thrombosis  - Cont to wean down oxygen requirements as tolerated, switch between HF and AVAPs  - Cont heparin drip    #PRAVIN  likely pre-renal  Cr stable at 1.7  - Cont monitoring cr  - Hold lasix    #Chronic Afib  - Cont cardizem drip  - Start amio drip if needed  - Cont metoprolol 50 BID if able to tolerate HFNC  - Switch all drips to PO meds if able to tolerate HFNC  - Cont heparin drip    #DM2  - Cont lantus/lispro 30-12-12-12  - ISS    DVT PPX, heparin drip

## 2021-03-03 NOTE — PROGRESS NOTE ADULT - SUBJECTIVE AND OBJECTIVE BOX
NEPHROLOGY FOLLOW UP NOTE    pt seen and examined  on  bipap  K+ high  npo on bipap  not taking po meds  bp's fair      PAST MEDICAL & SURGICAL HISTORY:  Gastroesophageal reflux disease    Enlarged liver    BPH (benign prostatic hyperplasia)    Essential hypertension    Heart valve disorder    History of back surgery      Allergies:  No Known Allergies    Home Medications Reviewed    SOCIAL HISTORY:  Denies ETOH,Smoking,   FAMILY HISTORY:  Family history of esophageal cancer          REVIEW OF SYSTEMS:  unobtainable      PHYSICAL EXAM:  Constitutional: ill appearing   HEENT: + hfo2  Neck: No JVD  Respiratory: CTAB,  Cardiovascular: S1, S2, RRR  Gastrointestinal: BS+, soft, NT/ND  Extremities: No cyanosis or clubbing. 1+ peripheral edema  Neurological: awake and alert, no limb weakness  : No CVA tenderness. No zaragoza  Skin: No rashes    Hospital Medications:   MEDICATIONS  (STANDING):  aMIOdarone Infusion 0.5 mG/Min (16.7 mL/Hr) IV Continuous <Continuous>  diltiazem Infusion 20 mG/Hr (20 mL/Hr) IV Continuous <Continuous>  heparin  Infusion 1100 Unit(s)/Hr (11 mL/Hr) IV Continuous <Continuous>  influenza   Vaccine 0.5 milliLiter(s) IntraMuscular once  insulin glargine Injectable (LANTUS) 30 Unit(s) SubCutaneous at bedtime  insulin lispro (ADMELOG) corrective regimen sliding scale   SubCutaneous three times a day before meals  insulin lispro (ADMELOG) corrective regimen sliding scale   SubCutaneous at bedtime  insulin lispro Injectable (ADMELOG) 12 Unit(s) SubCutaneous three times a day before meals  metoprolol tartrate 50 milliGRAM(s) Oral two times a day  pantoprazole    Tablet 40 milliGRAM(s) Oral before breakfast  simvastatin 20 milliGRAM(s) Oral at bedtime  tamsulosin 0.4 milliGRAM(s) Oral at bedtime        VITALS:  T(F): 97.6 (03-03-21 @ 16:22), Max: 98.4 (03-03-21 @ 04:00)  HR: 99 (03-03-21 @ 16:22)  BP: 113/72 (03-03-21 @ 16:22)  RR: 20 (03-03-21 @ 16:22)  SpO2: 93% (03-03-21 @ 09:28)  Wt(kg): --    03-01 @ 07:01  -  03-02 @ 07:00  --------------------------------------------------------  IN: 439 mL / OUT: 1000 mL / NET: -561 mL    03-02 @ 07:01  -  03-03 @ 07:00  --------------------------------------------------------  IN: 1225 mL / OUT: 300 mL / NET: 925 mL          LABS:  03-03    142  |  103  |  68<HH>  ----------------------------<  204<H>  5.9<H>   |  22  |  1.7<H>    Ca    8.6      03 Mar 2021 07:46  Mg     3.2     03-03    TPro  6.1  /  Alb  2.6<L>  /  TBili  0.6  /  DBili      /  AST  37  /  ALT  22  /  AlkPhos  114  03-03                          16.0   21.87 )-----------( 302      ( 03 Mar 2021 07:46 )             48.2       Urine Studies:        RADIOLOGY & ADDITIONAL STUDIES:

## 2021-03-03 NOTE — PROGRESS NOTE ADULT - ASSESSMENT
Pt is a 76 year old M with recent positive COVID test on 02/11 who presented with 1 week of SOB on 02/12, found to have COVID pneumonia, admitted on 02/12 for hypoxic RF secondary to COVID pneumonia with stay complicated by acute DVT and possible PE. Currently hemodynamically stable.    #Acute Hypoxic Respiratory Failure Secondary to COVID Pneumonia  Recent COVID positive test on 02/11. SARS-COV2: positive 02/12. Chest X Ray: 02/12 bilateral opacities  - Infectious Disease and Pulmonary team are on board  - Monitor for fever: afebrile in last 24 hours. Tylenol PRN for fever  - Trend WBC: 02/22 20.46 -> 02/24 20.72 -> 02/26 20.4 -> 20.76 02/27 -> 19.5 3/1 -> 21 3/2 -> __ 3/3  - Monitor SaO2 and Oxygen Requirements: Able to wean to HFNC yesterday, placed back on NIV overnight. NIV AVAPs EPAP 10 FiO2 100%. Currently on AVAPS, trial of HFNC this AM. May need intubation if cannot tolerated off NIV.  - Chest X Ray 3/2: Unchanged b/l opacities.   - Trend Inflammatory Markers:  --> D-dimer 68 02/12 -> 2959 02/22 -> 3921 02/24 -> 02/26 2766 -> 02/28 2300 > 3/2 1060  --> Procalcitonin 0.06 02/12 -> 0.24 (02/20) -> 0.28 02/21 -> 02/22 00.18 -> 02/24 0.12 -> 02/26 0.08-> 02/28 0.07 > pending   --> C-reactive protein 02/12 8.53 -> 02/17 5.99 -> 02/18 9.66 -> 13.5 02/21 -> 02/22 6.78 -> 02/24 2.45 -> 02/26 0.82-> 02/28 51 > pending    --> Ferritin 02/12 982 -> 1137 (02/13) -> 1246 (02/15) -> 1231 (02/17) -> 02/22 2398 -> 02/25 2995 -> 02/28 pend > 2/3 3133   --> LDH 02/12 263 -> 442 (02/16)  - Blood cultures 02/24: negative   - COVID therapy:  --> S/P 1 unit of convalescent plasma 02/13  --> s/p IV Remdesevir: S/P 100mg QD from 02/13-02/17  --> s/p 10-day course of IV Dexamethasone 6mg QD from 02/15-02/24  - Therapeutic Anticoagulation Heparin Drip (details under Acute DVT and possible PE)    #Provoked DVT  #Possible PE  Duplex Venous (02/19): left common femoral, femoral, popliteal DVT, left gastrocnemius, right small superficial thrombosis. Home med: coumadin for afib. D-dimer following. ED 02/12 INR 1.83 s/p Vitamin K x2 doses  - Heme onc on board for supra therapeutic INR in ED and presumed PE/ DIC: will consider bridging to coumadin (plus Aspirin 81mg QD per Dr Carl) once clinically improves and COVID resolves. Last coumadin dose 3mg x1 02/14.   - Continue Heparin Drip for now: currently at rate of 11ml/hour. FU aPTT qD    #Pre-Renal PRAVIN Secondary to reduced PO intake on NR and possibly the IV Lasix dose of 40mg on 02/20  Possibly Secondary to reduced PO intake on NR and possibly the IV Lasix dose of 40mg on 02/20. Baseline Cr 1.1 -> 02/20 BUN 84, Cr 2.0.   - 02/21 and 02/22 Cr 2.7  - Nephrology Dr Grady consulted on 02/25: monitor off IV fluids, PRN lasix, PRN lokelma, avoid zaragoza, avoid CT contrast for risk of contrast-induced nephropathy  - Monitor I/Os, monitor BUN and Cr: 02/25 Cr 2.2 -> 02/26 BUN 14, Cr 1.7 -> 02/27 1.5 > 3/1 1.6 > 3/2 1.7 > 3/3 Cr __  - Continue holding PO Lasix 40mg QD since 02/24     #Chronic Atrial Fibrillation  Rate uncontrolled possibly 2ry to ARF. Home coumadin 3mg QD, Cardizem 240mg QD. ECG 02/13 afib with RVR  - S/P IV Digoxin 0.25mg x2 doses on 02/22. Digoxin level 02/23: 0.3  - Cardiology Dr Carl on board: last on 02/21  - Continue PO Lopressor 5mg BID (Switched from IV Lopressor 5mg Q6h on 02/25)  - Continue PO Amiodarone 200mg BID (Switched from Amio drip at 16.7ml/hour on 02/25)   - Started PO Cardizem 60mg Q8h on 02/26 and discontinued Cardizem drip on 02/27  - If HR remains controlled with above-mentioned plan, will consider discontinuing PO Amiodarone and keeping only: PO Lopressor 5mg BID and PO Cardizem 60mg Q8h per Dr Carl  - For AC: c/w heparin drip as above  - Keep K>4, Mg>2    #hyperkalemia  K+ 5.6 2/28 > 5.4 3/1, s/p 10g lokelma  -K+ 5.2 3/2 > __ 3/3    #Hypertension  Home meds: Losartan 50mg BID, Cardizem 240mg QD, and Lasix 40mg QD.   - Monitor BP: 02/28 100/65, 118/62 mmHg  - Holding Lasix PO 40mg QD starting 02/24 for pre-renal PRAVIN  - Continue PO Lopressor 5mg BID (Switched from IV Lopressor 5mg Q6h on 02/25)  - Continue PO Amiodarone 200mg BID (Switched from Amio drip at 16.7ml/hour on 02/25)   - Started PO Cardizem 60mg Q8h on 02/26 and discontinued Cardizem drip on 02/27    #DM II  A1c 02/13 11. Home metformin 500mg BID  - Monitor POCT   - Continue Lantus 30u, Lispro 12 u TID, and Scale B    #GERD  Home Omeprazole 20mg QD  - c/w protonix 40mg QD PO    #HLD  No lipid profile. Home simvastatin 20mg QD  - Continue Simvastatin 20mg QD    DVT ppx: heparin drip  GI ppx: pantoprazole 40mg qD  Labs: CMP+Mg, CBC   Diet: Dysphagia 3 when off BIPAP, get nutrition c/s for PPN/TPN if cannot tolerated off BIPAP  Dispo: pending clinical improvement  FULL CODE per [facility] 2/24   Pt is a 76 year old M with recent positive COVID test on 02/11 who presented with 1 week of SOB on 02/12, found to have COVID pneumonia, admitted on 02/12 for hypoxic RF secondary to COVID pneumonia with stay complicated by acute DVT and possible PE. Currently hemodynamically stable.    #Acute Hypoxic Respiratory Failure Secondary to COVID Pneumonia  Recent COVID positive test on 02/11. SARS-COV2: positive 02/12. Chest X Ray: 02/12 bilateral opacities  - Infectious Disease and Pulmonary team are on board  - Monitor for fever: afebrile in last 24 hours. Tylenol PRN for fever  - Trend WBC: 02/22 20.46 -> 02/24 20.72 -> 02/26 20.4 -> 20.76 02/27 -> 19.5 3/1 -> 21 3/2 -> __ 3/3  - Monitor SaO2 and Oxygen Requirements: Able to wean to HFNC yesterday, placed back on NIV overnight. NIV AVAPs EPAP 10 FiO2 100%. Currently on AVAPS, trial of HFNC this AM. May need intubation if cannot tolerated off NIV.  - Chest X Ray 3/2: Unchanged b/l opacities.   - Trend Inflammatory Markers:  --> D-dimer 68 02/12 -> 2959 02/22 -> 3921 02/24 -> 02/26 2766 -> 02/28 2300 > 3/2 1060  --> Procalcitonin 0.06 02/12 -> 0.24 (02/20) -> 0.28 02/21 -> 02/22 00.18 -> 02/24 0.12 -> 02/26 0.08-> 02/28 0.07 > pending   --> C-reactive protein 02/12 8.53 -> 02/17 5.99 -> 02/18 9.66 -> 13.5 02/21 -> 02/22 6.78 -> 02/24 2.45 -> 02/26 0.82-> 02/28 51 > pending    --> Ferritin 02/12 982 -> 1137 (02/13) -> 1246 (02/15) -> 1231 (02/17) -> 02/22 2398 -> 02/25 2995 -> 02/28 pend > 2/3 3133   --> LDH 02/12 263 -> 442 (02/16)  - Blood cultures 02/24: negative   - COVID therapy:  --> S/P 1 unit of convalescent plasma 02/13  --> s/p IV Remdesevir: S/P 100mg QD from 02/13-02/17  --> s/p 10-day course of IV Dexamethasone 6mg QD from 02/15-02/24  - Therapeutic Anticoagulation Heparin Drip (details under Acute DVT and possible PE)    #Provoked DVT  #Possible PE  Duplex Venous (02/19): left common femoral, femoral, popliteal DVT, left gastrocnemius, right small superficial thrombosis. Home med: coumadin for afib. D-dimer following. ED 02/12 INR 1.83 s/p Vitamin K x2 doses  - Heme onc on board for supra therapeutic INR in ED and presumed PE/ DIC: will consider bridging to coumadin (plus Aspirin 81mg QD per Dr Carl) once clinically improves and COVID resolves. Last coumadin dose 3mg x1 02/14.   - Continue Heparin Drip for now: currently at rate of 11ml/hour. FU aPTT qD    #Pre-Renal PRAVIN Secondary to reduced PO intake on NR and possibly the IV Lasix dose of 40mg on 02/20  Possibly Secondary to reduced PO intake on NR and possibly the IV Lasix dose of 40mg on 02/20. Baseline Cr 1.1 -> 02/20 BUN 84, Cr 2.0.   - 02/21 and 02/22 Cr 2.7  - Nephrology Dr Grady consulted on 02/25: monitor off IV fluids, PRN lasix, PRN lokelma, avoid zaragoza, avoid CT contrast for risk of contrast-induced nephropathy  - Monitor I/Os, monitor BUN and Cr: 02/25 Cr 2.2 -> 02/26 BUN 14, Cr 1.7 -> 02/27 1.5 > 3/1 1.6 > 3/2 1.7 > 3/3 Cr __  - Continue holding PO Lasix 40mg QD since 02/24     #Chronic Atrial Fibrillation  Rate uncontrolled possibly 2ry to ARF. Home coumadin 3mg QD, Cardizem 240mg QD. ECG 02/13 afib with RVR  - S/P IV Digoxin 0.25mg x2 doses on 02/22. Digoxin level 02/23: 0.3  - Cardiology Dr Carl on board: last on 02/21  - Continue PO Lopressor 5mg BID (Switched from IV Lopressor 5mg Q6h on 02/25)  - Continue PO Amiodarone 200mg BID (Switched from Amio drip at 16.7ml/hour on 02/25)   - Started PO Cardizem 60mg Q8h on 02/26 and discontinued Cardizem drip on 02/27  - If HR remains controlled with above-mentioned plan, will consider discontinuing PO Amiodarone and keeping only: PO Lopressor 5mg BID and PO Cardizem 60mg Q8h per Dr Carl  - For AC: c/w heparin drip as above  - Keep K>4, Mg>2    #hyperkalemia  K+ 5.6 2/28 > 5.4 3/1, s/p 10g lokelma  -K+ 5.2 3/2 > __ 3/3    #Hypertension  Home meds: Losartan 50mg BID, Cardizem 240mg QD, and Lasix 40mg QD.   - Monitor BP: 02/28 100/65, 118/62 mmHg  - Holding Lasix PO 40mg QD starting 02/24 for pre-renal PRAVIN  - Continue PO Lopressor 5mg BID (Switched from IV Lopressor 5mg Q6h on 02/25)  - Continue PO Amiodarone 200mg BID (Switched from Amio drip at 16.7ml/hour on 02/25)   - Started PO Cardizem 60mg Q8h on 02/26 and discontinued Cardizem drip on 02/27    #DM II  A1c 02/13 11. Home metformin 500mg BID  - Monitor POCT   - Continue Lantus 30u, Lispro 12 u TID, and Scale B    #GERD  Home Omeprazole 20mg QD  - c/w protonix 40mg QD PO    #HLD  No lipid profile. Home simvastatin 20mg QD  - Continue Simvastatin 20mg QD    DVT ppx: heparin drip  GI ppx: pantoprazole 40mg qD  Labs: CMP+Mg, CBC   Diet: Dysphagia 3 when off BIPAP, get nutrition c/s for PPN/TPN if cannot tolerated off BIPAP  PT/rehab: consult when off BIPAP and can tolerate  Dispo: pending clinical improvement  FULL CODE per [facility] 2/24   Pt is a 76 year old M with recent positive COVID test on 02/11 who presented with 1 week of SOB on 02/12, found to have COVID pneumonia, admitted on 02/12 for hypoxic RF secondary to COVID pneumonia with stay complicated by acute DVT and possible PE. Currently hemodynamically stable.    #Acute Hypoxic Respiratory Failure Secondary to COVID Pneumonia  Recent COVID positive test on 02/11. SARS-COV2: positive 02/12. Chest X Ray: 02/12 bilateral opacities  - Infectious Disease and Pulmonary team are on board  - Monitor for fever: afebrile in last 24 hours. Tylenol PRN for fever  - Trend WBC: 02/22 20.46 -> 02/24 20.72 -> 02/26 20.4 -> 20.76 02/27 -> 19.5 3/1 -> 21 3/2 -> 21.8 3/3  - Monitor SaO2 and Oxygen Requirements: Able to wean to HFNC yesterday, placed back on NIV overnight. NIV AVAPs EPAP 10 FiO2 100%. Currently on AVAPS, trial of HFNC this AM. May need intubation if cannot tolerated off NIV.  - Chest X Ray 3/2: Unchanged b/l opacities.   - Trend Inflammatory Markers:  --> D-dimer 68 02/12 -> 2959 02/22 -> 3921 02/24 -> 02/26 2766 -> 02/28 2300 > 3/2 1060  --> Procalcitonin 0.06 02/12 -> 0.24 (02/20) -> 0.28 02/21 -> 02/22 00.18 -> 02/24 0.12 -> 02/26 0.08-> 02/28 0.07 > pending   --> C-reactive protein 02/12 8.53 -> 02/17 5.99 -> 02/18 9.66 -> 13.5 02/21 -> 02/22 6.78 -> 02/24 2.45 -> 02/26 0.82-> 02/28 51 > pending    --> Ferritin 02/12 982 -> 1137 (02/13) -> 1246 (02/15) -> 1231 (02/17) -> 02/22 2398 -> 02/25 2995 -> 02/28 pend > 2/3 3133   --> LDH 02/12 263 -> 442 (02/16)  - Blood cultures 02/24: negative   - COVID therapy:  --> S/P 1 unit of convalescent plasma 02/13  --> s/p IV Remdesevir: S/P 100mg QD from 02/13-02/17  --> s/p 10-day course of IV Dexamethasone 6mg QD from 02/15-02/24  - Therapeutic Anticoagulation Heparin Drip (details under Acute DVT and possible PE)    #Provoked DVT  #Possible PE  Duplex Venous (02/19): left common femoral, femoral, popliteal DVT, left gastrocnemius, right small superficial thrombosis. Home med: coumadin for afib. D-dimer following. ED 02/12 INR 1.83 s/p Vitamin K x2 doses  - Heme onc on board for supra therapeutic INR in ED and presumed PE/ DIC: will consider bridging to coumadin (plus Aspirin 81mg QD per Dr Carl) once clinically improves and COVID resolves. Last coumadin dose 3mg x1 02/14.   - Continue Heparin Drip for now: currently at rate of 11ml/hour. FU aPTT qD    #Pre-Renal PRAVIN Secondary to reduced PO intake on NR and possibly the IV Lasix dose of 40mg on 02/20  Possibly Secondary to reduced PO intake on NR and possibly the IV Lasix dose of 40mg on 02/20. Baseline Cr 1.1 -> 02/20 BUN 84, Cr 2.0.   - 02/21 and 02/22 Cr 2.7  - Nephrology Dr Grady consulted on 02/25: monitor off IV fluids, PRN lasix, PRN lokelma, avoid zaragoza, avoid CT contrast for risk of contrast-induced nephropathy  - Monitor I/Os, monitor BUN and Cr: 02/25 Cr 2.2 -> 02/26 BUN 14, Cr 1.7 -> 02/27 1.5 > 3/1 1.6 > 3/2 1.7 > 3/3 Cr __  - Continue holding PO Lasix 40mg QD since 02/24     #Chronic Atrial Fibrillation  Rate uncontrolled possibly 2ry to ARF. Home coumadin 3mg QD, Cardizem 240mg QD. ECG 02/13 afib with RVR  - S/P IV Digoxin 0.25mg x2 doses on 02/22. Digoxin level 02/23: 0.3  - Cardiology Dr Ahilan on board: last on 02/21  - Continue PO Lopressor 5mg BID (Switched from IV Lopressor 5mg Q6h on 02/25)  - Continue PO Amiodarone 200mg BID (Switched from Amio drip at 16.7ml/hour on 02/25)   - Started PO Cardizem 60mg Q8h on 02/26 and discontinued Cardizem drip on 02/27  - If HR remains controlled with above-mentioned plan, will consider discontinuing PO Amiodarone and keeping only: PO Lopressor 5mg BID and PO Cardizem 60mg Q8h per Dr Carl  - For AC: c/w heparin drip as above  - Keep K>4, Mg>2  - currently on diltiazem drip, amiodarone drip while on NIV    #hyperkalemia  K+ 5.6 2/28 > 5.4 3/1, s/p 10g lokelma  -K+ 5.2 3/2 > __ 3/3    #Hypertension  Home meds: Losartan 50mg BID, Cardizem 240mg QD, and Lasix 40mg QD.   - Monitor BP: 02/28 100/65, 118/62 mmHg  - Holding Lasix PO 40mg QD starting 02/24 for pre-renal PRAVIN  - Continue PO Lopressor 5mg BID (Switched from IV Lopressor 5mg Q6h on 02/25)  - Continue PO Amiodarone 200mg BID (Switched from Amio drip at 16.7ml/hour on 02/25)   - Started PO Cardizem 60mg Q8h on 02/26 and discontinued Cardizem drip on 02/27    #DM II  A1c 02/13 11. Home metformin 500mg BID  - Monitor POCT   - Continue Lantus 30u, Lispro 12 u TID, and Scale B    #GERD  Home Omeprazole 20mg QD  - c/w protonix 40mg QD PO    #HLD  No lipid profile. Home simvastatin 20mg QD  - Continue Simvastatin 20mg QD    DVT ppx: heparin drip  GI ppx: pantoprazole 40mg qD  Labs: CMP+Mg, CBC   Diet: Dysphagia 3 when off BIPAP, get nutrition c/s for PPN/TPN if cannot tolerated off BIPAP  PT/rehab: consult when off BIPAP and can tolerate  Dispo: pending clinical improvement  FULL CODE per [facility] 2/24   Pt is a 76 year old M with recent positive COVID test on 02/11 who presented with 1 week of SOB on 02/12, found to have COVID pneumonia, admitted on 02/12 for hypoxic RF secondary to COVID pneumonia with stay complicated by acute DVT and possible PE. Currently hemodynamically stable.    #Acute Hypoxic Respiratory Failure Secondary to COVID Pneumonia  Recent COVID positive test on 02/11. SARS-COV2: positive 02/12. Chest X Ray: 02/12 bilateral opacities  - Infectious Disease and Pulmonary team are on board  - Monitor for fever: afebrile in last 24 hours. Tylenol PRN for fever  - Trend WBC: 02/22 20.46 -> 02/24 20.72 -> 02/26 20.4 -> 20.76 02/27 -> 19.5 3/1 -> 21 3/2 -> 21.8 3/3  - Monitor SaO2 and Oxygen Requirements: Able to wean to HFNC yesterday, placed back on NIV overnight. NIV AVAPs EPAP 10 FiO2 100%. Currently on AVAPS, trial of HFNC this AM. May need intubation if cannot tolerated off NIV.  - Chest X Ray 3/2: Unchanged b/l opacities.   - Trend Inflammatory Markers:  --> D-dimer 68 02/12 -> 2959 02/22 -> 3921 02/24 -> 02/26 2766 -> 02/28 2300 > 3/2 1060  --> Procalcitonin 0.06 02/12 -> 0.24 (02/20) -> 0.28 02/21 -> 02/22 00.18 -> 02/24 0.12 -> 02/26 0.08-> 02/28 0.07 > pending   --> C-reactive protein 02/12 8.53 -> 02/17 5.99 -> 02/18 9.66 -> 13.5 02/21 -> 02/22 6.78 -> 02/24 2.45 -> 02/26 0.82-> 02/28 51 > pending    --> Ferritin 02/12 982 -> 1137 (02/13) -> 1246 (02/15) -> 1231 (02/17) -> 02/22 2398 -> 02/25 2995 -> 02/28 pend > 2/3 3133   --> LDH 02/12 263 -> 442 (02/16)  - Blood cultures 02/24: negative   - COVID therapy:  --> S/P 1 unit of convalescent plasma 02/13  --> s/p IV Remdesevir: S/P 100mg QD from 02/13-02/17  --> s/p 10-day course of IV Dexamethasone 6mg QD from 02/15-02/24  - Therapeutic Anticoagulation Heparin Drip (details under Acute DVT and possible PE)    #Provoked DVT  #Possible PE  Duplex Venous (02/19): left common femoral, femoral, popliteal DVT, left gastrocnemius, right small superficial thrombosis. Home med: coumadin for afib. D-dimer following. ED 02/12 INR 1.83 s/p Vitamin K x2 doses  - Heme onc on board for supra therapeutic INR in ED and presumed PE/ DIC: will consider bridging to coumadin (plus Aspirin 81mg QD per Dr Carl) once clinically improves and COVID resolves. Last coumadin dose 3mg x1 02/14.   - Continue Heparin Drip for now: currently at rate of 11ml/hour. FU aPTT qD    #Pre-Renal PRAVIN Secondary to reduced PO intake on NR and possibly the IV Lasix dose of 40mg on 02/20  Possibly Secondary to reduced PO intake on NR and possibly the IV Lasix dose of 40mg on 02/20. Baseline Cr 1.1 -> 02/20 BUN 84, Cr 2.0.   - 02/21 and 02/22 Cr 2.7  - Nephrology Dr Grady consulted on 02/25: monitor off IV fluids, PRN lasix, PRN lokelma, avoid zaragoza, avoid CT contrast for risk of contrast-induced nephropathy  - Monitor I/Os, monitor BUN and Cr: 02/25 Cr 2.2 -> 02/26 BUN 14, Cr 1.7 -> 02/27 1.5 > 3/1 1.6 > 3/2 1.7 > 3/3 Cr 1.7  - Continue holding PO Lasix 40mg QD since 02/24     #Chronic Atrial Fibrillation  Rate uncontrolled possibly 2ry to ARF. Home coumadin 3mg QD, Cardizem 240mg QD. ECG 02/13 afib with RVR  - S/P IV Digoxin 0.25mg x2 doses on 02/22. Digoxin level 02/23: 0.3  - Cardiology Dr Carl on board: last on 02/21  - Continue PO Lopressor 5mg BID (Switched from IV Lopressor 5mg Q6h on 02/25)  - Continue PO Amiodarone 200mg BID (Switched from Amio drip at 16.7ml/hour on 02/25)   - Started PO Cardizem 60mg Q8h on 02/26 and discontinued Cardizem drip on 02/27  - If HR remains controlled with above-mentioned plan, will consider discontinuing PO Amiodarone and keeping only: PO Lopressor 5mg BID and PO Cardizem 60mg Q8h per Dr Carl  - For AC: c/w heparin drip as above  - Keep K>4, Mg>2  - currently on diltiazem drip, amiodarone drip while on NIV    #hyperkalemia  K+ 5.6 2/28 > 5.4 3/1, s/p 10g lokelma  -K+ 5.2 3/2 > 5.9 3/3, s/p insulin/d50, f/u EKG    #Hypertension  Home meds: Losartan 50mg BID, Cardizem 240mg QD, and Lasix 40mg QD.   - Monitor BP: 02/28 100/65, 118/62 mmHg  - Holding Lasix PO 40mg QD starting 02/24 for pre-renal PRAVIN  - Continue PO Lopressor 5mg BID (Switched from IV Lopressor 5mg Q6h on 02/25)  - Continue PO Amiodarone 200mg BID (Switched from Amio drip at 16.7ml/hour on 02/25)   - Started PO Cardizem 60mg Q8h on 02/26 and discontinued Cardizem drip on 02/27    #DM II  A1c 02/13 11. Home metformin 500mg BID  - Monitor POCT   - Continue Lantus 30u, Lispro 12 u TID, and Scale B    #GERD  Home Omeprazole 20mg QD  - c/w protonix 40mg QD PO    #HLD  No lipid profile. Home simvastatin 20mg QD  - Continue Simvastatin 20mg QD    DVT ppx: heparin drip  GI ppx: pantoprazole 40mg qD  Labs: CMP+Mg, CBC   Diet: Dysphagia 3 when off BIPAP, get nutrition c/s for PPN/TPN if cannot tolerated off BIPAP  PT/rehab: consult when off BIPAP and can tolerate  Dispo: pending clinical improvement  FULL CODE per [facility] 2/24   Pt is a 76 year old M with recent positive COVID test on 02/11 who presented with 1 week of SOB on 02/12, found to have COVID pneumonia, admitted on 02/12 for hypoxic RF secondary to COVID pneumonia with stay complicated by acute DVT and possible PE. Currently hemodynamically stable.    #Acute Hypoxic Respiratory Failure Secondary to COVID Pneumonia  Recent COVID positive test on 02/11. SARS-COV2: positive 02/12. Chest X Ray: 02/12 bilateral opacities  - Infectious Disease and Pulmonary team are on board  - Monitor for fever: afebrile in last 24 hours. Tylenol PRN for fever  - Trend WBC: 02/22 20.46 -> 02/24 20.72 -> 02/26 20.4 -> 20.76 02/27 -> 19.5 3/1 -> 21 3/2 -> 21.8 3/3  - Monitor SaO2 and Oxygen Requirements: Able to wean to HFNC yesterday, placed back on NIV overnight. NIV AVAPs EPAP 10 FiO2 100%. Currently on AVAPS, trial of HFNC this AM. May need intubation if cannot tolerated off NIV.  - Chest X Ray 3/2: Unchanged b/l opacities.   - Trend Inflammatory Markers:  --> D-dimer 68 02/12 -> 2959 02/22 -> 3921 02/24 -> 02/26 2766 -> 02/28 2300 > 3/2 1060  --> Procalcitonin 0.06 02/12 -> 0.24 (02/20) -> 0.28 02/21 -> 02/22 00.18 -> 02/24 0.12 -> 02/26 0.08-> 02/28 0.07 > pending   --> C-reactive protein 02/12 8.53 -> 02/17 5.99 -> 02/18 9.66 -> 13.5 02/21 -> 02/22 6.78 -> 02/24 2.45 -> 02/26 0.82-> 02/28 51 > pending    --> Ferritin 02/12 982 -> 1137 (02/13) -> 1246 (02/15) -> 1231 (02/17) -> 02/22 2398 -> 02/25 2995 -> 02/28 pend > 2/3 3133   --> LDH 02/12 263 -> 442 (02/16)  - Blood cultures 02/24: negative   - COVID therapy:  --> S/P 1 unit of convalescent plasma 02/13  --> s/p IV Remdesevir: S/P 100mg QD from 02/13-02/17  --> s/p 10-day course of IV Dexamethasone 6mg QD from 02/15-02/24  - Therapeutic Anticoagulation Heparin Drip (details under Acute DVT and possible PE)    #Provoked DVT  #Possible PE  Duplex Venous (02/19): left common femoral, femoral, popliteal DVT, left gastrocnemius, right small superficial thrombosis. Home med: coumadin for afib. D-dimer following. ED 02/12 INR 1.83 s/p Vitamin K x2 doses  - Heme onc on board for supra therapeutic INR in ED and presumed PE/ DIC: will consider bridging to coumadin (plus Aspirin 81mg QD per Dr Carl) once clinically improves and COVID resolves. Last coumadin dose 3mg x1 02/14.   - Continue Heparin Drip for now: currently at rate of 11ml/hour. FU aPTT qD    #Pre-Renal PRAVIN Secondary to reduced PO intake on NR and possibly the IV Lasix dose of 40mg on 02/20  Possibly Secondary to reduced PO intake on NR and possibly the IV Lasix dose of 40mg on 02/20. Baseline Cr 1.1 -> 02/20 BUN 84, Cr 2.0.   - 02/21 and 02/22 Cr 2.7  - Nephrology Dr Grady consulted on 02/25: monitor off IV fluids, PRN lasix, PRN lokelma, avoid zaragoza, avoid CT contrast for risk of contrast-induced nephropathy  - Monitor I/Os, monitor BUN and Cr: 02/25 Cr 2.2 -> 02/26 BUN 14, Cr 1.7 -> 02/27 1.5 > 3/1 1.6 > 3/2 1.7 > 3/3 Cr 1.7  - Continue holding PO Lasix 40mg QD since 02/24     #Chronic Atrial Fibrillation  Rate uncontrolled possibly 2ry to ARF. Home coumadin 3mg QD, Cardizem 240mg QD. ECG 02/13 afib with RVR  - S/P IV Digoxin 0.25mg x2 doses on 02/22. Digoxin level 02/23: 0.3  - Cardiology Dr Carl on board: last on 02/21  - Continue PO Lopressor 5mg BID (Switched from IV Lopressor 5mg Q6h on 02/25)  - Continue PO Amiodarone 200mg BID (Switched from Amio drip at 16.7ml/hour on 02/25)   - Started PO Cardizem 60mg Q8h on 02/26 and discontinued Cardizem drip on 02/27  - If HR remains controlled with above-mentioned plan, will consider discontinuing PO Amiodarone and keeping only: PO Lopressor 5mg BID and PO Cardizem 60mg Q8h per Dr Carl  - For AC: c/w heparin drip as above  - Keep K>4, Mg>2  - currently on diltiazem drip, amiodarone drip while on NIV    #hyperkalemia  K+ 5.6 2/28 > 5.4 3/1, s/p 10g lokelma  -K+ 5.2 3/2 > 5.9 3/3, s/p insulin/d50, calcium gluconate, f/u EKG    #Hypertension  Home meds: Losartan 50mg BID, Cardizem 240mg QD, and Lasix 40mg QD.   - Monitor BP: 02/28 100/65, 118/62 mmHg  - Holding Lasix PO 40mg QD starting 02/24 for pre-renal PRAVIN  - Continue PO Lopressor 5mg BID (Switched from IV Lopressor 5mg Q6h on 02/25)  - Continue PO Amiodarone 200mg BID (Switched from Amio drip at 16.7ml/hour on 02/25)   - Started PO Cardizem 60mg Q8h on 02/26 and discontinued Cardizem drip on 02/27    #DM II  A1c 02/13 11. Home metformin 500mg BID  - Monitor POCT   - Continue Lantus 30u, Lispro 12 u TID, and Scale B    #GERD  Home Omeprazole 20mg QD  - c/w protonix 40mg QD PO    #HLD  No lipid profile. Home simvastatin 20mg QD  - Continue Simvastatin 20mg QD    DVT ppx: heparin drip  GI ppx: pantoprazole 40mg qD  Labs: CMP+Mg, CBC   Diet: Dysphagia 3 when off BIPAP, get nutrition c/s for PPN/TPN if cannot tolerated off BIPAP  PT/rehab: consult when off BIPAP and can tolerate  Dispo: pending clinical improvement  FULL CODE per [facility] 2/24   Pt is a 76 year old M with recent positive COVID test on 02/11 who presented with 1 week of SOB on 02/12, found to have COVID pneumonia, admitted on 02/12 for hypoxic RF secondary to COVID pneumonia with stay complicated by acute DVT and possible PE. Currently hemodynamically stable.    #Acute Hypoxic Respiratory Failure Secondary to COVID Pneumonia  Recent COVID positive test on 02/11. SARS-COV2: positive 02/12. Chest X Ray: 02/12 bilateral opacities  - Infectious Disease and Pulmonary team are on board  - Monitor for fever: afebrile in last 24 hours. Tylenol PRN for fever  - Trend WBC: 02/22 20.46 -> 02/24 20.72 -> 02/26 20.4 -> 20.76 02/27 -> 19.5 3/1 -> 21 3/2 -> 21.8 3/3  - Monitor SaO2 and Oxygen Requirements: Able to wean to HFNC yesterday, placed back on NIV overnight. NIV AVAPs EPAP 10 FiO2 100%. Currently on AVAPS, trial of HFNC this AM. May need intubation if cannot tolerated off NIV.  - Chest X Ray 3/2: Unchanged b/l opacities.   - Trend Inflammatory Markers:  --> D-dimer 68 02/12 -> 2959 02/22 -> 3921 02/24 -> 02/26 2766 -> 02/28 2300 > 3/2 1060  --> Procalcitonin 0.06 02/12 -> 0.24 (02/20) -> 0.28 02/21 -> 02/22 00.18 -> 02/24 0.12 -> 02/26 0.08-> 02/28 0.07 > pending   --> C-reactive protein 02/12 8.53 -> 02/17 5.99 -> 02/18 9.66 -> 13.5 02/21 -> 02/22 6.78 -> 02/24 2.45 -> 02/26 0.82-> 02/28 51 > pending    --> Ferritin 02/12 982 -> 1137 (02/13) -> 1246 (02/15) -> 1231 (02/17) -> 02/22 2398 -> 02/25 2995 -> 02/28 pend > 2/3 3133   --> LDH 02/12 263 -> 442 (02/16)  - Blood cultures 02/24: negative   - COVID therapy:  --> S/P 1 unit of convalescent plasma 02/13  --> s/p IV Remdesevir: S/P 100mg QD from 02/13-02/17  --> s/p 10-day course of IV Dexamethasone 6mg QD from 02/15-02/24  - Therapeutic Anticoagulation Heparin Drip (details under Acute DVT and possible PE)    #Provoked DVT  #Possible PE  Duplex Venous (02/19): left common femoral, femoral, popliteal DVT, left gastrocnemius, right small superficial thrombosis. Home med: coumadin for afib. D-dimer following. ED 02/12 INR 1.83 s/p Vitamin K x2 doses  - Heme onc on board for supra therapeutic INR in ED and presumed PE/ DIC: will consider bridging to coumadin (plus Aspirin 81mg QD per Dr Carl) once clinically improves and COVID resolves. Last coumadin dose 3mg x1 02/14.   - Continue Heparin Drip for now: currently at rate of 11ml/hour. FU aPTT qD    #Pre-Renal PRAVIN Secondary to reduced PO intake on NR and possibly the IV Lasix dose of 40mg on 02/20  Possibly Secondary to reduced PO intake on NR and possibly the IV Lasix dose of 40mg on 02/20. Baseline Cr 1.1 -> 02/20 BUN 84, Cr 2.0.   - 02/21 and 02/22 Cr 2.7  - Nephrology Dr Grady consulted on 02/25: monitor off IV fluids, PRN lasix, PRN lokelma, avoid zaragoza, avoid CT contrast for risk of contrast-induced nephropathy  - Monitor I/Os, monitor BUN and Cr: 02/25 Cr 2.2 -> 02/26 BUN 14, Cr 1.7 -> 02/27 1.5 > 3/1 1.6 > 3/2 1.7 > 3/3 Cr 1.7  - Continue holding PO Lasix 40mg QD since 02/24     #Chronic Atrial Fibrillation  Rate uncontrolled possibly 2ry to ARF. Home coumadin 3mg QD, Cardizem 240mg QD. ECG 02/13 afib with RVR  - S/P IV Digoxin 0.25mg x2 doses on 02/22. Digoxin level 02/23: 0.3  - Cardiology Dr Carl on board: last on 02/21  - Continue PO Lopressor 5mg BID (Switched from IV Lopressor 5mg Q6h on 02/25)  - Continue PO Amiodarone 200mg BID (Switched from Amio drip at 16.7ml/hour on 02/25)   - Started PO Cardizem 60mg Q8h on 02/26 and discontinued Cardizem drip on 02/27  - If HR remains controlled with above-mentioned plan, will consider discontinuing PO Amiodarone and keeping only: PO Lopressor 5mg BID and PO Cardizem 60mg Q8h per Dr Carl  - For AC: c/w heparin drip as above  - Keep K>4, Mg>2  - currently on diltiazem drip, amiodarone drip while on NIV    #hyperkalemia  K+ 5.6 2/28 > 5.4 3/1, s/p 10g lokelma  -K+ 5.2 3/2 > 5.9 3/3, s/p insulin/d50, calcium gluconate, f/u EKG, 20mg IV lasix per renal as cannot tolerate PO on NIV    #Hypertension  Home meds: Losartan 50mg BID, Cardizem 240mg QD, and Lasix 40mg QD.   - Monitor BP: 02/28 100/65, 118/62 mmHg  - Holding Lasix PO 40mg QD starting 02/24 for pre-renal PRAVIN  - Continue PO Lopressor 5mg BID (Switched from IV Lopressor 5mg Q6h on 02/25)  - Continue PO Amiodarone 200mg BID (Switched from Amio drip at 16.7ml/hour on 02/25)   - Started PO Cardizem 60mg Q8h on 02/26 and discontinued Cardizem drip on 02/27    #DM II  A1c 02/13 11. Home metformin 500mg BID  - Monitor POCT   - Continue Lantus 30u, Lispro 12 u TID, and Scale B    #GERD  Home Omeprazole 20mg QD  - c/w protonix 40mg QD PO    #HLD  No lipid profile. Home simvastatin 20mg QD  - Continue Simvastatin 20mg QD    DVT ppx: heparin drip  GI ppx: pantoprazole 40mg qD  Labs: CMP+Mg, CBC   Diet: Dysphagia 3 when off BIPAP, get nutrition c/s for PPN/TPN if cannot tolerated off BIPAP  PT/rehab: consult when off BIPAP and can tolerate  Dispo: pending clinical improvement  FULL CODE per [facility] 2/24  Contact: Rupali (wife) [phone], spoke to wife today gave update/answered questions, understands may need intubation and agreeable    Pt is a 76 year old M with recent positive COVID test on 02/11 who presented with 1 week of SOB on 02/12, found to have COVID pneumonia, admitted on 02/12 for hypoxic RF secondary to COVID pneumonia with stay complicated by acute DVT and possible PE. Currently hemodynamically stable.    #Acute Hypoxic Respiratory Failure Secondary to COVID Pneumonia  Recent COVID positive test on 02/11. SARS-COV2: positive 02/12. Chest X Ray: 02/12 bilateral opacities  - Infectious Disease and Pulmonary team are on board  - Monitor for fever: afebrile in last 24 hours. Tylenol PRN for fever  - Trend WBC: 02/22 20.46 -> 02/24 20.72 -> 02/26 20.4 -> 20.76 02/27 -> 19.5 3/1 -> 21 3/2 -> 21.8 3/3  - Monitor SaO2 and Oxygen Requirements: Able to wean to HFNC yesterday, placed back on NIV overnight. NIV AVAPs EPAP 10 FiO2 100%. Currently on AVAPS, trial of HFNC this AM. May need intubation if cannot tolerated off NIV.  - Chest X Ray 3/2: Unchanged b/l opacities.   - Trend Inflammatory Markers:  --> D-dimer 68 02/12 -> 2959 02/22 -> 3921 02/24 -> 02/26 2766 -> 02/28 2300 > 3/2 1060  --> Procalcitonin 0.06 02/12 -> 0.24 (02/20) -> 0.28 02/21 -> 02/22 00.18 -> 02/24 0.12 -> 02/26 0.08-> 02/28 0.07 > pending   --> C-reactive protein 02/12 8.53 -> 02/17 5.99 -> 02/18 9.66 -> 13.5 02/21 -> 02/22 6.78 -> 02/24 2.45 -> 02/26 0.82-> 02/28 51 > pending    --> Ferritin 02/12 982 -> 1137 (02/13) -> 1246 (02/15) -> 1231 (02/17) -> 02/22 2398 -> 02/25 2995 -> 02/28 pend > 2/3 3133   --> LDH 02/12 263 -> 442 (02/16)  - Blood cultures 02/24: negative   - COVID therapy:  --> S/P 1 unit of convalescent plasma 02/13  --> s/p IV Remdesevir: S/P 100mg QD from 02/13-02/17  --> s/p 10-day course of IV Dexamethasone 6mg QD from 02/15-02/24  - Therapeutic Anticoagulation Heparin Drip (details under Acute DVT and possible PE)    #Provoked DVT  #Possible PE  Duplex Venous (02/19): left common femoral, femoral, popliteal DVT, left gastrocnemius, right small superficial thrombosis. Home med: coumadin for afib. D-dimer following. ED 02/12 INR 1.83 s/p Vitamin K x2 doses  - Heme onc on board for supra therapeutic INR in ED and presumed PE/ DIC: will consider bridging to coumadin (plus Aspirin 81mg QD per Dr Carl) once clinically improves and COVID resolves. Last coumadin dose 3mg x1 02/14.   - Continue Heparin Drip for now: currently at rate of 11ml/hour. FU aPTT qD    #Pre-Renal PRAVIN Secondary to reduced PO intake on NR and possibly the IV Lasix dose of 40mg on 02/20  Possibly Secondary to reduced PO intake on NR and possibly the IV Lasix dose of 40mg on 02/20. Baseline Cr 1.1 -> 02/20 BUN 84, Cr 2.0.   - 02/21 and 02/22 Cr 2.7  - Nephrology Dr Grady consulted on 02/25: monitor off IV fluids, PRN lasix, PRN lokelma, avoid zaragoza, avoid CT contrast for risk of contrast-induced nephropathy  - Monitor I/Os, monitor BUN and Cr: 02/25 Cr 2.2 -> 02/26 BUN 14, Cr 1.7 -> 02/27 1.5 > 3/1 1.6 > 3/2 1.7 > 3/3 Cr 1.7  - Continue holding PO Lasix 40mg QD since 02/24     #Chronic Atrial Fibrillation  Rate uncontrolled possibly 2ry to ARF. Home coumadin 3mg QD, Cardizem 240mg QD. ECG 02/13 afib with RVR  - S/P IV Digoxin 0.25mg x2 doses on 02/22. Digoxin level 02/23: 0.3  - Cardiology Dr Carl on board: last on 02/21  - Continue PO Lopressor 5mg BID (Switched from IV Lopressor 5mg Q6h on 02/25)  - Continue PO Amiodarone 200mg BID (Switched from Amio drip at 16.7ml/hour on 02/25)   - Started PO Cardizem 60mg Q8h on 02/26 and discontinued Cardizem drip on 02/27  - If HR remains controlled with above-mentioned plan, will consider discontinuing PO Amiodarone and keeping only: PO Lopressor 5mg BID and PO Cardizem 60mg Q8h per Dr Carl  - For AC: c/w heparin drip as above  - Keep K>4, Mg>2  - currently on diltiazem drip, amiodarone drip while on NIV    #hyperkalemia  K+ 5.6 2/28 > 5.4 3/1, s/p 10g lokelma  -K+ 5.2 3/2 > 5.9 3/3, s/p insulin/d50, calcium gluconate, f/u EKG, 20mg IV lasix per renal as cannot tolerate PO on NIV    #Hypertension  Home meds: Losartan 50mg BID, Cardizem 240mg QD, and Lasix 40mg QD.   - Monitor BP: 02/28 100/65, 118/62 mmHg  - Holding Lasix PO 40mg QD starting 02/24 for pre-renal PRAVIN  - Continue PO Lopressor 5mg BID (Switched from IV Lopressor 5mg Q6h on 02/25)  - Continue PO Amiodarone 200mg BID (Switched from Amio drip at 16.7ml/hour on 02/25)   - Started PO Cardizem 60mg Q8h on 02/26 and discontinued Cardizem drip on 02/27    #DM II  A1c 02/13 11. Home metformin 500mg BID  - Monitor POCT   - Continue Lantus 30u, Lispro 12 u TID, and Scale B    #GERD  Home Omeprazole 20mg QD  - c/w protonix 40mg QD PO    #HLD  No lipid profile. Home simvastatin 20mg QD  - Continue Simvastatin 20mg QD    DVT ppx: heparin drip  GI ppx: pantoprazole 40mg qD  Labs: CMP+Mg, CBC   Diet: Dysphagia 3 when off BIPAP, get nutrition c/s for PPN/TPN if cannot tolerated off BIPAP  PT/rehab: consult when off BIPAP and can tolerate  Dispo: pending clinical improvement  FULL CODE per [facility] 2/24  Contact: Rupali (wife) [phone], spoke to wife and older son (Don) today gave update/answered questions, understands may need intubation and agreeable

## 2021-03-04 NOTE — AIRWAY PLACEMENT NOTE ADULT - AIRWAY COMMENTS:
large dry mucus plug blocking laryngeal view large dry mucus plug blocking laryngeal view, suctioned out of view, intubated and confirmed with video laryngoscopy,

## 2021-03-04 NOTE — PROGRESS NOTE ADULT - SUBJECTIVE AND OBJECTIVE BOX
Patient is a 76y old  Male who presents with a chief complaint of atrial fibrillation (26 Feb 2021 19:27)        Over Night Events:        ROS:     All ROS are negative except HPI         PHYSICAL EXAM    ICU Vital Signs Last 24 Hrs  T(C): 36.4 (04 Mar 2021 04:00), Max: 36.6 (03 Mar 2021 08:14)  T(F): 97.6 (04 Mar 2021 04:00), Max: 97.9 (03 Mar 2021 12:05)  HR: 70 (04 Mar 2021 07:00) (70 - 99)  BP: 124/76 (04 Mar 2021 07:00) (104/66 - 127/79)  BP(mean): 93 (04 Mar 2021 07:00) (80 - 96)  ABP: --  ABP(mean): --  RR: 28 (04 Mar 2021 07:00) (20 - 35)  SpO2: 97% (04 Mar 2021 07:00) (93% - 99%)      CONSTITUTIONAL:  Well nourished. In moderate respiratory distress     ENT:   Airway patent,   Mouth with normal mucosa.   No thrush    EYES:   Pupils equal,   Round and reactive to light.    CARDIAC:   Normal rate,   Irregular rhythm.     edema      Vascular:  Normal systolic impulse  No Carotid bruits    RESPIRATORY:   No wheezing  Bilateral crackles   Normal chest expansion   tachypneic,  use of accessory muscles    GASTROINTESTINAL:  Abdomen soft,   Non-tender,   No guarding,   + BS    MUSCULOSKELETAL:   Range of motion is not limited,  No clubbing, cyanosis    NEUROLOGICAL:   Alert and oriented   No motor  deficits.    SKIN:   Skin normal color for race,   Warm and dry and intact.   No evidence of rash.    PSYCHIATRIC:   Anxious looking  No apparent risk to self or others.    HEMATOLOGICAL:  No cervical  lymphadenopathy.  no inguinal lymphadenopathy      03-03-21 @ 07:01  -  03-04-21 @ 07:00  --------------------------------------------------------  IN:    Amiodarone: 217.1 mL    Diltiazem: 300 mL    Heparin: 242 mL    IV PiggyBack: 100 mL  Total IN: 859.1 mL    OUT:    Voided (mL): 200 mL  Total OUT: 200 mL    Total NET: 659.1 mL          LABS:                            16.0   21.87 )-----------( 302      ( 03 Mar 2021 07:46 )             48.2                      16.0   21.87 )-----------( 302      ( 03-03 @ 07:46 )             48.2                15.9   21.04 )-----------( 289      ( 03-02 @ 04:30 )             48.3                15.8   19.57 )-----------( 233      ( 03-01 @ 07:54 )             47.6                                             03-03    142  |  103  |  68<HH>  ----------------------------<  204<H>  5.9<H>   |  22  |  1.7<H>    Creatinine Trend  BUN 68, Cr 1.7, (03-03-21 @ 07:46)  Creatinine Trend  BUN 68, Cr 1.7, (03-02-21 @ 04:30)  Creatinine Trend  BUN 72, Cr 1.6, (03-01-21 @ 07:54)  Creatinine Trend  BUN 76, Cr 1.6, (02-28-21 @ 08:15)  Creatinine Trend  BUN 85, Cr 1.5, (02-27-21 @ 08:06)      Ca    8.6      03 Mar 2021 07:46  Mg     3.2     03-03    TPro  6.1  /  Alb  2.6<L>  /  TBili  0.6  /  DBili  x   /  AST  37  /  ALT  22  /  AlkPhos  114  03-03      PTT - ( 03 Mar 2021 07:46 )  PTT:86.6 sec                                                                                     LIVER FUNCTIONS - ( 03 Mar 2021 07:46 )  Alb: 2.6 g/dL / Pro: 6.1 g/dL / ALK PHOS: 114 U/L / ALT: 22 U/L / AST: 37 U/L / GGT: x                                                                                                                                       MEDICATIONS  (STANDING):  aMIOdarone Infusion 0.5 mG/Min (16.7 mL/Hr) IV Continuous <Continuous>  diltiazem Infusion 20 mG/Hr (20 mL/Hr) IV Continuous <Continuous>  heparin  Infusion 1100 Unit(s)/Hr (11 mL/Hr) IV Continuous <Continuous>  influenza   Vaccine 0.5 milliLiter(s) IntraMuscular once  insulin glargine Injectable (LANTUS) 30 Unit(s) SubCutaneous at bedtime  insulin lispro (ADMELOG) corrective regimen sliding scale   SubCutaneous three times a day before meals  insulin lispro (ADMELOG) corrective regimen sliding scale   SubCutaneous at bedtime  insulin lispro Injectable (ADMELOG) 12 Unit(s) SubCutaneous three times a day before meals  metoprolol tartrate Injectable 5 milliGRAM(s) IV Push every 6 hours  pantoprazole  Injectable 40 milliGRAM(s) IV Push daily  simvastatin 20 milliGRAM(s) Oral at bedtime  tamsulosin 0.4 milliGRAM(s) Oral at bedtime    MEDICATIONS  (PRN):  acetaminophen   Tablet .. 650 milliGRAM(s) Oral every 4 hours PRN Temp greater or equal to 38.5C (101.3F)  BACItracin   Ointment 1 Application(s) Topical daily PRN dry skin  sodium chloride 0.65% Nasal 1 Spray(s) Both Nostrils two times a day PRN Nasal Congestion      New X-rays reviewed:                                                                                  ECHO    CXR interpreted by me:  Worsening infiltrates

## 2021-03-04 NOTE — AIRWAY PLACEMENT NOTE ADULT - POST AIRWAY PLACEMENT ASSESSMENT:
breath sounds bilateral patient desaturated post intubation and did not improve/breath sounds bilateral/chest excursion noted

## 2021-03-04 NOTE — PROCEDURE NOTE - NSINDICATIONS_GEN_A_CORE
critical patient/symptomatic bradycardia
critical illness/emergency venous access
critical patient/monitoring purposes

## 2021-03-04 NOTE — PROGRESS NOTE ADULT - SUBJECTIVE AND OBJECTIVE BOX
NEPHROLOGY FOLLOW UP NOTE    s/p cardiac arrest  s/p intubation  now hypotensive and bradycardic      PAST MEDICAL & SURGICAL HISTORY:  Gastroesophageal reflux disease    Enlarged liver    BPH (benign prostatic hyperplasia)    Essential hypertension    Heart valve disorder    History of back surgery      Allergies:  No Known Allergies    Home Medications Reviewed    SOCIAL HISTORY:  Denies ETOH,Smoking,   FAMILY HISTORY:  Family history of esophageal cancer          REVIEW OF SYSTEMS:  unobtainable      PHYSICAL EXAM:  Constitutional: sedated  HEENT: ETT  Neck: No JVD  Respiratory: CTAB,  Cardiovascular: S1, S2, RRR  Gastrointestinal: BS+, soft, NT/ND  Extremities: No cyanosis or clubbing. 1+ peripheral edema  Neurological: sedated  : No zaragoza  Skin: No rashes  + groin TLC    Hospital Medications:   MEDICATIONS  (STANDING):  aMIOdarone Infusion 0.5 mG/Min (16.7 mL/Hr) IV Continuous <Continuous>  dexMEDEtomidine Infusion 0.5 MICROgram(s)/kG/Hr (12 mL/Hr) IV Continuous <Continuous>  diltiazem Infusion 20 mG/Hr (20 mL/Hr) IV Continuous <Continuous>  EPINEPHrine    Infusion 0.03 MICROgram(s)/kG/Min (5.4 mL/Hr) IV Continuous <Continuous>  fentaNYL   Infusion. 0.5 MICROgram(s)/kG/Hr (4.8 mL/Hr) IV Continuous <Continuous>  heparin  Infusion 1100 Unit(s)/Hr (11 mL/Hr) IV Continuous <Continuous>  influenza   Vaccine 0.5 milliLiter(s) IntraMuscular once  insulin glargine Injectable (LANTUS) 30 Unit(s) SubCutaneous at bedtime  insulin lispro (ADMELOG) corrective regimen sliding scale   SubCutaneous three times a day before meals  insulin lispro (ADMELOG) corrective regimen sliding scale   SubCutaneous at bedtime  insulin lispro Injectable (ADMELOG) 12 Unit(s) SubCutaneous three times a day before meals  metoprolol tartrate Injectable 5 milliGRAM(s) IV Push every 6 hours  norepinephrine Infusion 0.05 MICROgram(s)/kG/Min (9 mL/Hr) IV Continuous <Continuous>  pantoprazole  Injectable 40 milliGRAM(s) IV Push daily  propofol Infusion 15 MICROgram(s)/kG/Min (8.64 mL/Hr) IV Continuous <Continuous>  simvastatin 20 milliGRAM(s) Oral at bedtime  tamsulosin 0.4 milliGRAM(s) Oral at bedtime        VITALS:  T(F): 96.5 (03-04-21 @ 08:00), Max: 97.9 (03-03-21 @ 12:05)  HR: 69 (03-04-21 @ 11:00)  BP: 44/27 (03-04-21 @ 11:00)  RR: 16 (03-04-21 @ 11:00)  SpO2: 98% (03-04-21 @ 10:30)  Wt(kg): --    03-02 @ 07:01  -  03-03 @ 07:00  --------------------------------------------------------  IN: 1225 mL / OUT: 300 mL / NET: 925 mL    03-03 @ 07:01  -  03-04 @ 07:00  --------------------------------------------------------  IN: 859.1 mL / OUT: 200 mL / NET: 659.1 mL          LABS:  03-04    144  |  106  |  72<HH>  ----------------------------<  177<H>  5.1<H>   |  22  |  1.9<H>    Ca    8.7      04 Mar 2021 08:20  Mg     3.1     03-04    TPro  5.9<L>  /  Alb  2.6<L>  /  TBili  0.6  /  DBili      /  AST  28  /  ALT  19  /  AlkPhos  94  03-04                          15.4   22.29 )-----------( 256      ( 04 Mar 2021 08:20 )             47.4       Urine Studies:        RADIOLOGY & ADDITIONAL STUDIES:

## 2021-03-04 NOTE — PROGRESS NOTE ADULT - PROVIDER SPECIALTY LIST ADULT
Internal Medicine
Nephrology
Nephrology
Pulmonology
Pulmonology
Cardiology
Critical Care
Hospitalist
Internal Medicine
Nephrology
Pulmonology
Cardiology
Hospitalist
Infectious Disease
Internal Medicine
Cardiology
Infectious Disease
Nephrology
Pulmonology
Infectious Disease
Infectious Disease
Cardiology
Infectious Disease

## 2021-03-04 NOTE — PROCEDURE NOTE - NSINFORMCONSENT_GEN_A_CORE
This was an emergent procedure.
by family/Benefits, risks, and possible complications of procedure explained to patient/caregiver who verbalized understanding and gave verbal consent.

## 2021-03-04 NOTE — PROGRESS NOTE ADULT - ASSESSMENT
Pt is a 76 year old M with recent positive COVID test on 02/11 who presented with 1 week of SOB on 02/12, found to have COVID pneumonia, admitted on 02/12 for hypoxic RF secondary to COVID pneumonia with stay complicated by acute DVT and possible PE. Currently hemodynamically stable.    #Acute Hypoxic Respiratory Failure  #Covid 19 PNA  Currently on AVAPs, saturating %. Unable to tolerate HFNC x3d, plan to intubate today per critcare  s/p 1 unit of convalescent plasma 02/13  s/p IV Remdesevir: S/P 100mg QD from 02/13-02/17  s/p 10-day course of IV Dexamethasone 6mg QD from 02/15-02/24  LE duplex (02/19): left common femoral, femoral, popliteal DVT, left gastrocnemius, right small superficial thrombosis  - Plan to transfer to MICU after intubation per critcare recds  - Start cefepime  - Repeat procal  - Repeat BCx, sputum cultures, Nasal MRSA  - Cont heparin drip    #PRAVIN  likely pre-renal  Cr stable at 1.7  - Cont monitoring cr  - Hold lasix    #Chronic Afib  - Cont cardizem drip  - Start amio drip if needed  - Cont metoprolol 50 BID via OGT  - Switch all drips to PO meds once OGT in place  - Cont heparin drip    #DM2  - Cont lantus/lispro 30-12-12-12  - ISS    DVT PPX, heparin drip

## 2021-03-04 NOTE — PROGRESS NOTE ADULT - ASSESSMENT
Pt is a 76 year old M with recent positive COVID test on 02/11 who presented with 1 week of SOB on 02/12, found to have COVID pneumonia, admitted on 02/12 for hypoxic RF secondary to COVID pneumonia with stay complicated by acute DVT and possible PE. Currently hemodynamically stable.    #Acute Hypoxic Respiratory Failure Secondary to COVID Pneumonia  Recent COVID positive test on 02/11. SARS-COV2: positive 02/12. Chest X Ray: 02/12 bilateral opacities  - Infectious Disease and Pulmonary team are on board  - Monitor for fever: afebrile in last 24 hours. Tylenol PRN for fever  - Trend WBC: 02/22 20.46 -> 02/24 20.72 -> 02/26 20.4 -> 20.76 02/27 -> 19.5 3/1 -> 21 3/2 -> 21.8 3/3 -> __ 3/4  - Monitor SaO2 and Oxygen Requirements: Able to wean to HFNC yesterday, placed back on NIV overnight. NIV AVAPs EPAP 10 FiO2 100%. Currently on AVAPS, trial of HFNC this AM. May need intubation if cannot tolerate off NIV.  - Chest X Ray 3/3: Stable b/l opacities.   - Trend Inflammatory Markers:  --> D-dimer 68 02/12 -> 2959 02/22 -> 3921 02/24 -> 02/26 2766 -> 02/28 2300 > 3/2 1060  --> Procalcitonin 0.06 02/12 -> 0.24 (02/20) -> 0.28 02/21 -> 02/22 00.18 -> 02/24 0.12 -> 02/26 0.08-> 02/28 0.07 > 3/2 0.08  --> C-reactive protein 02/12 8.53 -> 02/17 5.99 -> 02/18 9.66 -> 13.5 02/21 -> 02/22 6.78 -> 02/24 2.45 -> 02/26 0.82-> 02/28 51 > 3/2 48  --> Ferritin 02/12 982 -> 1137 (02/13) -> 1246 (02/15) -> 1231 (02/17) -> 02/22 2398 -> 02/25 2995 -> 02/28 pend > 3/2 3133   --> LDH 02/12 263 -> 442 (02/16)  - Blood cultures 02/24: negative   - COVID therapy:  --> S/P 1 unit of convalescent plasma 02/13  --> s/p IV Remdesevir: S/P 100mg QD from 02/13-02/17  --> s/p 10-day course of IV Dexamethasone 6mg QD from 02/15-02/24  - Therapeutic Anticoagulation Heparin Drip (details under Acute DVT and possible PE)    #Provoked DVT  #Possible PE  Duplex Venous (02/19): left common femoral, femoral, popliteal DVT, left gastrocnemius, right small superficial thrombosis. Home med: coumadin for afib. D-dimer following. ED 02/12 INR 1.83 s/p Vitamin K x2 doses  - Heme onc on board for supra therapeutic INR in ED and presumed PE/ DIC: will consider bridging to coumadin (plus Aspirin 81mg QD per Dr Carl) once clinically improves and COVID resolves. Last coumadin dose 3mg x1 02/14.   - Continue Heparin Drip for now: currently at rate of 11ml/hour. FU aPTT qD    #Pre-Renal PRAVIN Secondary to reduced PO intake on NR and possibly the IV Lasix dose of 40mg on 02/20  Possibly Secondary to reduced PO intake on NR and possibly the IV Lasix dose of 40mg on 02/20. Baseline Cr 1.1 -> 02/20 BUN 84, Cr 2.0.   - 02/21 and 02/22 Cr 2.7  - Nephrology Dr Grady consulted on 02/25: monitor off IV fluids, PRN lasix, PRN lokelma, avoid zaragoza, avoid CT contrast for risk of contrast-induced nephropathy  - Monitor I/Os, monitor BUN and Cr: 02/25 Cr 2.2 -> 02/26 BUN 14, Cr 1.7 -> 02/27 1.5 > 3/1 1.6 > 3/2 1.7 > 3/3 1.7 > 3/4 Cr __  - Continue holding PO Lasix 40mg QD since 02/24     #Chronic Atrial Fibrillation  Rate uncontrolled possibly 2ry to ARF. Home coumadin 3mg QD, Cardizem 240mg QD. ECG 02/13 afib with RVR  - S/P IV Digoxin 0.25mg x2 doses on 02/22. Digoxin level 02/23: 0.3  - Cardiology Dr Carl on board: last on 02/21  - Continue PO Lopressor 5mg BID (Switched from IV Lopressor 5mg Q6h on 02/25)  - Continue PO Amiodarone 200mg BID (Switched from Amio drip at 16.7ml/hour on 02/25)   - Started PO Cardizem 60mg Q8h on 02/26 and discontinued Cardizem drip on 02/27  - If HR remains controlled with above-mentioned plan, will consider discontinuing PO Amiodarone and keeping only: PO Lopressor 5mg BID and PO Cardizem 60mg Q8h per Dr Carl  - For AC: c/w heparin drip as above  - Keep K>4, Mg>2  - currently on diltiazem drip, amiodarone drip while on NIV    #hyperkalemia  K+ 5.6 2/28 > 5.4 3/1, s/p 10g lokelma  -K+ 5.2 3/2 > 5.9 3/3, s/p insulin/d50, calcium gluconate, 20mg IV lasix per renal > __ 3/4    #Hypertension  Home meds: Losartan 50mg BID, Cardizem 240mg QD, and Lasix 40mg QD.   - Monitor BP: 02/28 100/65, 118/62 mmHg  - Holding Lasix PO 40mg QD starting 02/24 for pre-renal PRAVIN  - Continue PO Lopressor 5mg BID (Switched from IV Lopressor 5mg Q6h on 02/25)  - Continue PO Amiodarone 200mg BID (Switched from Amio drip at 16.7ml/hour on 02/25)   - Started PO Cardizem 60mg Q8h on 02/26 and discontinued Cardizem drip on 02/27    #DM II  A1c 02/13 11. Home metformin 500mg BID  - Monitor POCT   - Continue Lantus 30u, Lispro 12 u TID, and Scale B    #GERD  Home Omeprazole 20mg QD  - c/w protonix 40mg QD PO    #HLD  No lipid profile. Home simvastatin 20mg QD  - Continue Simvastatin 20mg QD    DVT ppx: heparin drip  GI ppx: pantoprazole 40mg qD  Labs: CMP+Mg, CBC   Diet: Dysphagia 3 when off BIPAP, get nutrition c/s for PPN/TPN if cannot tolerated off BIPAP  PT/rehab: consult when off BIPAP and can tolerate  Dispo: pending clinical improvement  FULL CODE per [facility] 2/24  Contact: Rupali (wife) [phone], ____   Pt is a 76 year old M with recent positive COVID test on 02/11 who presented with 1 week of SOB on 02/12, found to have COVID pneumonia, admitted on 02/12 for hypoxic RF secondary to COVID pneumonia with stay complicated by acute DVT and possible PE. Currently hemodynamically stable.    #Acute Hypoxic Respiratory Failure Secondary to COVID Pneumonia  Recent COVID positive test on 02/11. SARS-COV2: positive 02/12. Chest X Ray: 02/12 bilateral opacities  - Infectious Disease and Pulmonary team are on board  - Monitor for fever: afebrile in last 24 hours. Tylenol PRN for fever  - Trend WBC: 02/22 20.46 -> 02/24 20.72 -> 02/26 20.4 -> 20.76 02/27 -> 19.5 3/1 -> 21 3/2 -> 21.8 3/3 -> 22.3 3/4  - Monitor SaO2 and Oxygen Requirements: Able to wean to HFNC yesterday, placed back on NIV overnight. NIV AVAPs EPAP 10 FiO2 100%. Currently on AVAPS. Unable to tolerated off NIV. Plan for intubation today.   - Chest X Ray 3/3: Stable b/l opacities.   - Trend Inflammatory Markers:  --> D-dimer 68 02/12 -> 2959 02/22 -> 3921 02/24 -> 02/26 2766 -> 02/28 2300 > 3/2 1060  --> Procalcitonin 0.06 02/12 -> 0.24 (02/20) -> 0.28 02/21 -> 02/22 00.18 -> 02/24 0.12 -> 02/26 0.08-> 02/28 0.07 > 3/2 0.08  --> C-reactive protein 02/12 8.53 -> 02/17 5.99 -> 02/18 9.66 -> 13.5 02/21 -> 02/22 6.78 -> 02/24 2.45 -> 02/26 0.82-> 02/28 51 > 3/2 48  --> Ferritin 02/12 982 -> 1137 (02/13) -> 1246 (02/15) -> 1231 (02/17) -> 02/22 2398 -> 02/25 2995 -> 02/28 pend > 3/2 3133   --> LDH 02/12 263 -> 442 (02/16)  - Blood cultures 02/24: negative   - COVID therapy:  --> S/P 1 unit of convalescent plasma 02/13  --> s/p IV Remdesevir: S/P 100mg QD from 02/13-02/17  --> s/p 10-day course of IV Dexamethasone 6mg QD from 02/15-02/24  - Therapeutic Anticoagulation Heparin Drip (details under Acute DVT and possible PE)    #Provoked DVT  #Possible PE  Duplex Venous (02/19): left common femoral, femoral, popliteal DVT, left gastrocnemius, right small superficial thrombosis. Home med: coumadin for afib. D-dimer following. ED 02/12 INR 1.83 s/p Vitamin K x2 doses  - Heme onc on board for supra therapeutic INR in ED and presumed PE/ DIC: will consider bridging to coumadin (plus Aspirin 81mg QD per Dr Carl) once clinically improves and COVID resolves. Last coumadin dose 3mg x1 02/14.   - Continue Heparin Drip for now: currently at rate of 11ml/hour. FU aPTT qD    #Pre-Renal PRAVIN Secondary to reduced PO intake on NR and possibly the IV Lasix dose of 40mg on 02/20  Possibly Secondary to reduced PO intake on NR and possibly the IV Lasix dose of 40mg on 02/20. Baseline Cr 1.1 -> 02/20 BUN 84, Cr 2.0.   - 02/21 and 02/22 Cr 2.7  - Nephrology Dr Grady consulted on 02/25: monitor off IV fluids, PRN lasix, PRN lokelma, avoid zaragoza, avoid CT contrast for risk of contrast-induced nephropathy  - Monitor I/Os, monitor BUN and Cr: 02/25 Cr 2.2 -> 02/26 BUN 14, Cr 1.7 -> 02/27 1.5 > 3/1 1.6 > 3/2 1.7 > 3/3 1.7 > 3/4 Cr __   - Continue holding PO Lasix 40mg QD since 02/24     #Chronic Atrial Fibrillation  Rate uncontrolled possibly 2ry to ARF. Home coumadin 3mg QD, Cardizem 240mg QD. ECG 02/13 afib with RVR  - S/P IV Digoxin 0.25mg x2 doses on 02/22. Digoxin level 02/23: 0.3  - Cardiology Dr Carl on board: last on 02/21  - Continue PO Lopressor 5mg BID (Switched from IV Lopressor 5mg Q6h on 02/25)  - Continue PO Amiodarone 200mg BID (Switched from Amio drip at 16.7ml/hour on 02/25)   - Started PO Cardizem 60mg Q8h on 02/26 and discontinued Cardizem drip on 02/27  - If HR remains controlled with above-mentioned plan, will consider discontinuing PO Amiodarone and keeping only: PO Lopressor 5mg BID and PO Cardizem 60mg Q8h per Dr Carl  - For AC: c/w heparin drip as above  - Keep K>4, Mg>2  - currently on diltiazem drip, amiodarone drip while on NIV    #hyperkalemia  K+ 5.6 2/28 > 5.4 3/1, s/p 10g lokelma  -K+ 5.2 3/2 > 5.9 3/3, s/p insulin/d50, calcium gluconate, 20mg IV lasix per renal > __ 3/4    #Hypertension  Home meds: Losartan 50mg BID, Cardizem 240mg QD, and Lasix 40mg QD.   - Monitor BP: 02/28 100/65, 118/62 mmHg  - Holding Lasix PO 40mg QD starting 02/24 for pre-renal PRAVIN  - Continue PO Lopressor 5mg BID (Switched from IV Lopressor 5mg Q6h on 02/25)  - Continue PO Amiodarone 200mg BID (Switched from Amio drip at 16.7ml/hour on 02/25)   - Started PO Cardizem 60mg Q8h on 02/26 and discontinued Cardizem drip on 02/27    #DM II  A1c 02/13 11. Home metformin 500mg BID  - Monitor POCT   - Continue Lantus 30u, Lispro 12 u TID, and Scale B    #GERD  Home Omeprazole 20mg QD  - c/w protonix 40mg QD PO    #HLD  No lipid profile. Home simvastatin 20mg QD  - Continue Simvastatin 20mg QD    DVT ppx: heparin drip  GI ppx: pantoprazole 40mg qD  Labs: CMP+Mg, CBC   Diet: _____  PT/rehab: pending when tolerating  Dispo: pending clinical improvement  FULL CODE per [facility] 2/24  Contact: Rupali (wife) [phone], Giuseppe (son) [phone], updated wife and son Don and answered questions/agreeable for intubation    Pt is a 76 year old M with recent positive COVID test on 02/11 who presented with 1 week of SOB on 02/12, found to have COVID pneumonia, admitted on 02/12 for hypoxic RF secondary to COVID pneumonia with stay complicated by acute DVT and possible PE. Currently hemodynamically stable.    #Acute Hypoxic Respiratory Failure Secondary to COVID Pneumonia  Recent COVID positive test on 02/11. SARS-COV2: positive 02/12. Chest X Ray: 02/12 bilateral opacities  - Infectious Disease and Pulmonary team are on board  - Monitor for fever: afebrile in last 24 hours. Tylenol PRN for fever  - Trend WBC: 02/22 20.46 -> 02/24 20.72 -> 02/26 20.4 -> 20.76 02/27 -> 19.5 3/1 -> 21 3/2 -> 21.8 3/3 -> 22.3 3/4  - Monitor SaO2 and Oxygen Requirements: Able to wean to HFNC yesterday, placed back on NIV overnight. NIV AVAPs EPAP 10 FiO2 100%. Currently on AVAPS. Unable to tolerated off NIV. Plan for intubation today.   - Chest X Ray 3/3: Stable b/l opacities.   - Trend Inflammatory Markers:  --> D-dimer 68 02/12 -> 2959 02/22 -> 3921 02/24 -> 02/26 2766 -> 02/28 2300 > 3/2 1060  --> Procalcitonin 0.06 02/12 -> 0.24 (02/20) -> 0.28 02/21 -> 02/22 00.18 -> 02/24 0.12 -> 02/26 0.08-> 02/28 0.07 > 3/2 0.08  --> C-reactive protein 02/12 8.53 -> 02/17 5.99 -> 02/18 9.66 -> 13.5 02/21 -> 02/22 6.78 -> 02/24 2.45 -> 02/26 0.82-> 02/28 51 > 3/2 48  --> Ferritin 02/12 982 -> 1137 (02/13) -> 1246 (02/15) -> 1231 (02/17) -> 02/22 2398 -> 02/25 2995 -> 02/28 pend > 3/2 3133   --> LDH 02/12 263 -> 442 (02/16)  - Blood cultures 02/24: negative   - COVID therapy:  --> S/P 1 unit of convalescent plasma 02/13  --> s/p IV Remdesevir: S/P 100mg QD from 02/13-02/17  --> s/p 10-day course of IV Dexamethasone 6mg QD from 02/15-02/24  - Therapeutic Anticoagulation Heparin Drip (details under Acute DVT and possible PE)    #Provoked DVT  #Possible PE  Duplex Venous (02/19): left common femoral, femoral, popliteal DVT, left gastrocnemius, right small superficial thrombosis. Home med: coumadin for afib. D-dimer following. ED 02/12 INR 1.83 s/p Vitamin K x2 doses  - Heme onc on board for supra therapeutic INR in ED and presumed PE/ DIC: will consider bridging to coumadin (plus Aspirin 81mg QD per Dr Carl) once clinically improves and COVID resolves. Last coumadin dose 3mg x1 02/14.   - Continue Heparin Drip for now: currently at rate of 11ml/hour. FU aPTT qD    #Pre-Renal PRAVIN Secondary to reduced PO intake on NR and possibly the IV Lasix dose of 40mg on 02/20  Possibly Secondary to reduced PO intake on NR and possibly the IV Lasix dose of 40mg on 02/20. Baseline Cr 1.1 -> 02/20 BUN 84, Cr 2.0.   - 02/21 and 02/22 Cr 2.7  - Nephrology Dr Grady consulted on 02/25: monitor off IV fluids, PRN lasix, PRN lokelma, avoid zaragoza, avoid CT contrast for risk of contrast-induced nephropathy  - Monitor I/Os, monitor BUN and Cr: 02/25 Cr 2.2 -> 02/26 BUN 14, Cr 1.7 -> 02/27 1.5 > 3/1 1.6 > 3/2 1.7 > 3/3 1.7 > 3/4 Cr 1.9  - Continue holding PO Lasix 40mg QD since 02/24     #Chronic Atrial Fibrillation  Rate uncontrolled possibly 2ry to ARF. Home coumadin 3mg QD, Cardizem 240mg QD. ECG 02/13 afib with RVR  - S/P IV Digoxin 0.25mg x2 doses on 02/22. Digoxin level 02/23: 0.3  - Cardiology Dr Carl on board: last on 02/21  - Continue PO Lopressor 5mg BID (Switched from IV Lopressor 5mg Q6h on 02/25)  - Continue PO Amiodarone 200mg BID (Switched from Amio drip at 16.7ml/hour on 02/25)   - Started PO Cardizem 60mg Q8h on 02/26 and discontinued Cardizem drip on 02/27  - If HR remains controlled with above-mentioned plan, will consider discontinuing PO Amiodarone and keeping only: PO Lopressor 5mg BID and PO Cardizem 60mg Q8h per Dr Carl  - For AC: c/w heparin drip as above  - Keep K>4, Mg>2  - currently on diltiazem drip, amiodarone drip while on NIV    #hyperkalemia  K+ 5.6 2/28 > 5.4 3/1, s/p 10g lokelma  -K+ 5.2 3/2 > 5.9 3/3, s/p insulin/d50, calcium gluconate, 20mg IV lasix per renal > 5.1 3/4    #Hypertension  Home meds: Losartan 50mg BID, Cardizem 240mg QD, and Lasix 40mg QD.   - Monitor BP: 02/28 100/65, 118/62 mmHg  - Holding Lasix PO 40mg QD starting 02/24 for pre-renal PRAVIN  - Continue PO Lopressor 5mg BID (Switched from IV Lopressor 5mg Q6h on 02/25)  - Continue PO Amiodarone 200mg BID (Switched from Amio drip at 16.7ml/hour on 02/25)   - Started PO Cardizem 60mg Q8h on 02/26 and discontinued Cardizem drip on 02/27    #DM II  A1c 02/13 11. Home metformin 500mg BID  - Monitor POCT   - Continue Lantus 30u, Lispro 12 u TID, and Scale B    #GERD  Home Omeprazole 20mg QD  - c/w protonix 40mg QD PO    #HLD  No lipid profile. Home simvastatin 20mg QD  - Continue Simvastatin 20mg QD    DVT ppx: heparin drip  GI ppx: pantoprazole 40mg qD  Labs: CMP+Mg, CBC   Diet: NPO  PT/rehab: pending when tolerating  Dispo: pending clinical improvement  FULL CODE per [facility] 2/24  Contact: Rupali (wife) [phone], Giuseppe (son) [phone], updated wife and son Don and answered questions/agreeable for intubation

## 2021-03-04 NOTE — GOALS OF CARE CONVERSATION - ADVANCED CARE PLANNING - CONVERSATION DETAILS
Goals of care were discussed with Mr. Mitchell at bedside.  Patient was made aware of his poor prognosis as he is still requiring NIV alternating with High Flow Oxygen.   Patient was also made aware of the possibility of acute deterioration at anytime during his stay.  Difference between being DNR/DNI and Full Code was addressed.  Patient wasn't to be full code (be resuscitated and intubated, if needed).
GOC discussed with patient's spouse as patient had a 3rd cardiac arrest with ROSC achieved s/p 1 round of compressions and epinephrine. Patient is maxed out on pressors, but continues to be bradycardic and hypotensive. I informed her it is likely that patient would have another cardiac arrest. She decided to make patient DNR, and would not like CPR performed if his heart were to stop again. However, she would like all other medical interventions to continue such as increasing pressor support, pacing, blood draws, abgs, etc.
Discussed GOC with patient's spouse after patient had cardiac arrest soon after intubation. ROSC was achieved within 2 minutes, however, pt had another cardiac arrest. ROSC achieved again after 1 round of compression. Patient's spouse was updated on events. She would like patient to remain full code for now and would like full medical intervention.

## 2021-03-04 NOTE — DISCHARGE NOTE FOR THE EXPIRED PATIENT - HOSPITAL COURSE
Pt is a 76 year old M with recent positive COVID test on  who presented with 1 week of SOB on , found to have COVID pneumonia, admitted on  for hypoxic RF secondary to COVID pneumonia with stay complicated by acute DVT and possible PE. Placed on heparin drip. S/P convalescent plasma , s/p IV Remdesevir: S/P 100mg QD from -, s/p 10-day course of IV Dexamethasone 6mg QD from 02/15-. Placed on AVAPS. Unable to tolerate off of AVAPS to HFNC. Course complicated by hyperkalemia to 5.6 on , 5.9 3/3, s/p lasix, 5.1 on 3/4 AM.     Pt intubated on 3/4. During intubation, patient became bradycardic > cardiac arrest. ROSC achieved after epinephrine and compressions. Pt had another cardiac arrest. ROSC achieved again after 1 round of compression. GOC discussed with patient's spouse as patient had a 3rd cardiac arrest with ROSC achieved s/p 1 round of compressions and epinephrine. Patient is maxed out on pressors, but continues to be bradycardic and hypotensive. I informed her it is likely that patient would have another cardiac arrest. She decided to make patient DNR, and would not like CPR performed if his heart were to stop again. Cardiology placed transvenous pacemaker, but patient went into asystole.  at 12:34.    Pt is a 76 year old M with recent positive COVID test on  who presented with 1 week of SOB on , found to have COVID pneumonia, admitted on  for hypoxic RF secondary to COVID pneumonia with stay complicated by acute DVT and possible PE. Placed on heparin drip. S/P convalescent plasma , s/p IV Remdesevir: S/P 100mg QD from -, s/p 10-day course of IV Dexamethasone 6mg QD from 02/15-. Placed on AVAPS. Unable to tolerate off of AVAPS to HFNC. Course complicated by hyperkalemia to 5.6 on , 5.9 3/3, s/p lasix, 5.1 on 3/4 AM.     Pt intubated on 3/4. During intubation, patient became bradycardic > cardiac arrest. ROSC achieved after epinephrine and compressions. Pt had another cardiac arrest. ROSC achieved again after 1 round of compression. GOC discussed with patient's spouse as patient had a 3rd cardiac arrest with ROSC achieved s/p 1 round of compressions and epinephrine. Patient is maxed out on pressors, but continues to be bradycardic and hypotensive. I informed her it is likely that patient would have another cardiac arrest. She decided to make patient DNR, and would not like CPR performed if his heart were to stop again. Cardiology placed transvenous pacemaker, but patient went into asystole.  at 12:34 pm.

## 2021-03-04 NOTE — PROGRESS NOTE ADULT - ASSESSMENT
IMPRESSION:  Acute hypoxemic respiratory failure worsening   COVID-19 PNA  Cytokine release?  HO SMILEY, noncompliant with CPAP  HO Afib, on coumadin at home  DVT possible PE   PRAVIN       PLAN:    CNS: Will need intubation and MV.  Sedation after MV      HEENT: Oral care    PULMONARY:  HOB @ 45 degrees.  Aspiration precautions.  Intubate and put on MV.  ARDS network MV setting.  A line.           CARDIOVASCULAR: Avoid volume overload.  Rate control.  [place] HD monitoring. Repeat BNP      GI: GI prophylaxis.  OG Feeding post intubation.  Bowel regimen     RENAL:  Follow up lytes.  Correct as needed.     INFECTIOUS: DISEASE: FU with ID.  DW ID.  Toci.  Repeat cultures.  Repeat Procal.  Cefepime.  Nasal MRSA.      HEMATOLOGICAL:  DVT therapy.  Heparin drip.  Monitor CBC and PTT     ENDOCRINE:  Follow up FS.  Insulin protocol if needed.    MUSCULOSKELETAL: bedrest    Overall poor prognosis    ICU

## 2021-03-04 NOTE — PROCEDURAL SAFETY CHECKLIST WITH OR WITHOUT SEDATION - NSGUIDEWIRESREMOVEDSD_GEN_ALL_CORE
Pt continues to smoke 40 cigs/day. Pt is still waiting  on tobacco cessation medication of chantix starter pack.to come in the mail.   Discussed symptoms rated as 3 or 4 on TCRS scale, none reported at this time. All related to NRT or withdrawal symptoms. None bothersome to pt at this time, will monitor. No problems or adverse effects noted at this time. Pt will continue with rate reduction. Pt's exhaled carbon monoxide level was 30 ppm per smokerlyzer. Will see pt back in group 1 wk. 
done
done

## 2021-03-04 NOTE — PROCEDURAL SAFETY CHECKLIST WITH OR WITHOUT SEDATION - NSPOSTCOMMENTFT_GEN_ALL_CORE
left fem mlc done emergently at bedside by dr criselda zuniga
left femoral roger placed emergently by dr criselda zuniga.

## 2021-03-04 NOTE — PROGRESS NOTE ADULT - ASSESSMENT
PRAVIN  hyperkalemia  ARF due to COVID-19 PNA  - s/p cardiac arrest x 2 with ROSC   - s/p intubation and mechanical intubation  valvular heart disease / afib  HTN  new DVT / possible PE  BPH    plan:    give 2L LR bolus  place zaragoza  dc flomax  vent support  pressor support PRN  f/u labs / ABG  icu care  d/w team  poor prognosis

## 2021-03-04 NOTE — PROCEDURE NOTE - ADDITIONAL PROCEDURE DETAILS
Per family wishes,  TVP was attempted on top of maximizing pressor support and medications.     Despite TVP,  pt wasn't able to capture the paced beats.    Pt became asystolic.      No compressions attempted due to DNR status made by family earlier.    TVP turned off and taken out,  and pt was pronounced. Per family wishes,  TVP was attempted on top of maximizing pressor support and medications.     Despite TVP,  pt wasn't able to capture the paced beats.    Pt became asystolic.      No compressions attempted due to DNR status made by family earlier.    TVP turned off and taken out,  and pt was pronounced.    I spoke to cardiology fellow and agree with above

## 2021-03-04 NOTE — PROGRESS NOTE ADULT - SUBJECTIVE AND OBJECTIVE BOX
SUBJECTIVE:    Patient is a 76y old Male who presents with a chief complaint of atrial fibrillation (26 Feb 2021 19:27)    Currently admitted to medicine with the primary diagnosis of COVID-19     Today is hospital day 17d. This morning he is resting comfortably in bed and reports no new issues or overnight events. Some SOB this AM. Unable to tolerated HFNC yesterday. Placed on AVAPS since yesterday AM. Pt and family would be agreeable to elective intubation.       PAST MEDICAL & SURGICAL HISTORY  Gastroesophageal reflux disease    Enlarged liver    BPH (benign prostatic hyperplasia)    Essential hypertension    Heart valve disorder    History of back surgery      SOCIAL HISTORY:  Negative for smoking/alcohol/drug use.     ALLERGIES:  No Known Allergies    MEDICATIONS:  STANDING MEDICATIONS  aMIOdarone Infusion 0.5 mG/Min IV Continuous <Continuous>  diltiazem Infusion 20 mG/Hr IV Continuous <Continuous>  heparin  Infusion 1100 Unit(s)/Hr IV Continuous <Continuous>  influenza   Vaccine 0.5 milliLiter(s) IntraMuscular once  insulin glargine Injectable (LANTUS) 30 Unit(s) SubCutaneous at bedtime  insulin lispro (ADMELOG) corrective regimen sliding scale   SubCutaneous three times a day before meals  insulin lispro (ADMELOG) corrective regimen sliding scale   SubCutaneous at bedtime  insulin lispro Injectable (ADMELOG) 12 Unit(s) SubCutaneous three times a day before meals  metoprolol tartrate Injectable 5 milliGRAM(s) IV Push every 6 hours  pantoprazole  Injectable 40 milliGRAM(s) IV Push daily  simvastatin 20 milliGRAM(s) Oral at bedtime  tamsulosin 0.4 milliGRAM(s) Oral at bedtime    PRN MEDICATIONS  acetaminophen   Tablet .. 650 milliGRAM(s) Oral every 4 hours PRN  BACItracin   Ointment 1 Application(s) Topical daily PRN  sodium chloride 0.65% Nasal 1 Spray(s) Both Nostrils two times a day PRN    VITALS:   T(F): 97.6  HR: 70  BP: 124/76  RR: 28  SpO2: 97%    LABS:                        16.0   21.87 )-----------( 302      ( 03 Mar 2021 07:46 )             48.2     03-03    142  |  103  |  68<HH>  ----------------------------<  204<H>  5.9<H>   |  22  |  1.7<H>    Ca    8.6      03 Mar 2021 07:46  Mg     3.2     03-03    TPro  6.1  /  Alb  2.6<L>  /  TBili  0.6  /  DBili  x   /  AST  37  /  ALT  22  /  AlkPhos  114  03-03    PTT - ( 03 Mar 2021 07:46 )  PTT:86.6 sec    RADIOLOGY:  < from: Xray Chest 1 View- PORTABLE-Routine (Xray Chest 1 View- PORTABLE-Routine in AM.) (03.03.21 @ 06:20) >  Impression:  Stable bilateral opacities.  < end of copied text >    PHYSICAL EXAM:  GEN: NAD, lying in bed comfortably   HEENT: EOMI, PERRLA, conjunctiva and sclera clear. MMM.  LUNGS: Clear to auscultation bilaterally. No rales, rhonchi, or wheezing appreciated. On AVAPS  HEART: S1/S2 present. IRR.   ABD: Soft, non-tender, non-distended. Bowel sounds present.  EXT: 2+ peripheral pulses. No clubbing, cyanosis, or edema.   NEURO: AAOX3. Speech clear. No focal neurological deficits. Sensation grossly intact.   Skin: No rashes or lesions.

## 2021-03-16 DIAGNOSIS — Z66 DO NOT RESUSCITATE: ICD-10-CM

## 2021-03-16 DIAGNOSIS — I82.412 ACUTE EMBOLISM AND THROMBOSIS OF LEFT FEMORAL VEIN: ICD-10-CM

## 2021-03-16 DIAGNOSIS — N17.9 ACUTE KIDNEY FAILURE, UNSPECIFIED: ICD-10-CM

## 2021-03-16 DIAGNOSIS — I10 ESSENTIAL (PRIMARY) HYPERTENSION: ICD-10-CM

## 2021-03-16 DIAGNOSIS — I46.9 CARDIAC ARREST, CAUSE UNSPECIFIED: ICD-10-CM

## 2021-03-16 DIAGNOSIS — J12.82 PNEUMONIA DUE TO CORONAVIRUS DISEASE 2019: ICD-10-CM

## 2021-03-16 DIAGNOSIS — N40.0 BENIGN PROSTATIC HYPERPLASIA WITHOUT LOWER URINARY TRACT SYMPTOMS: ICD-10-CM

## 2021-03-16 DIAGNOSIS — U07.1 COVID-19: ICD-10-CM

## 2021-03-16 DIAGNOSIS — E87.5 HYPERKALEMIA: ICD-10-CM

## 2021-03-16 DIAGNOSIS — I26.99 OTHER PULMONARY EMBOLISM WITHOUT ACUTE COR PULMONALE: ICD-10-CM

## 2021-03-16 DIAGNOSIS — E11.65 TYPE 2 DIABETES MELLITUS WITH HYPERGLYCEMIA: ICD-10-CM

## 2021-03-16 DIAGNOSIS — I82.462 ACUTE EMBOLISM AND THROMBOSIS OF LEFT CALF MUSCULAR VEIN: ICD-10-CM

## 2021-03-16 DIAGNOSIS — K21.9 GASTRO-ESOPHAGEAL REFLUX DISEASE WITHOUT ESOPHAGITIS: ICD-10-CM

## 2021-03-16 DIAGNOSIS — Z79.01 LONG TERM (CURRENT) USE OF ANTICOAGULANTS: ICD-10-CM

## 2021-03-16 DIAGNOSIS — Z79.84 LONG TERM (CURRENT) USE OF ORAL HYPOGLYCEMIC DRUGS: ICD-10-CM

## 2021-03-16 DIAGNOSIS — I48.19 OTHER PERSISTENT ATRIAL FIBRILLATION: ICD-10-CM

## 2021-03-16 DIAGNOSIS — I82.432 ACUTE EMBOLISM AND THROMBOSIS OF LEFT POPLITEAL VEIN: ICD-10-CM

## 2021-03-16 DIAGNOSIS — J80 ACUTE RESPIRATORY DISTRESS SYNDROME: ICD-10-CM

## 2021-03-16 DIAGNOSIS — G47.33 OBSTRUCTIVE SLEEP APNEA (ADULT) (PEDIATRIC): ICD-10-CM

## 2021-03-16 DIAGNOSIS — D65 DISSEMINATED INTRAVASCULAR COAGULATION [DEFIBRINATION SYNDROME]: ICD-10-CM

## 2021-03-16 DIAGNOSIS — Z91.19 PATIENT'S NONCOMPLIANCE WITH OTHER MEDICAL TREATMENT AND REGIMEN: ICD-10-CM

## 2021-03-16 DIAGNOSIS — T50.1X5A ADVERSE EFFECT OF LOOP [HIGH-CEILING] DIURETICS, INITIAL ENCOUNTER: ICD-10-CM

## 2021-03-18 ENCOUNTER — APPOINTMENT (OUTPATIENT)
Dept: MEDICATION MANAGEMENT | Facility: CLINIC | Age: 76
End: 2021-03-18

## 2022-03-31 NOTE — ED ADULT NURSE NOTE - NSSUHOSCREENINGYN_ED_ALL_ED
67M PMH HTN, CAD POD6 from PCI with NESHA placement in left circumflex, on DAPT, presenting for ORIF of left calcaneal fracture. Cardiology consulted for pre-operative evaluation.      # Medical optimization  - Patient denies any current chest pain, palpitations, and SOB.    - Patient has history of HTN, PVCs, CAD s/p PCI with NESHA 3/24, High general cardiovascular risk.  - Good METs at baseline (4+): patient is able to ambulate unassisted, denies need to rest when walking on flat ground and upstairs.  - Patient takes plavix 75mg, ASA 81mg for recent NESHA placement. Also on atorvastatin 80mg for HLD, amlodipine 5mg/irbesartan 300mg/metoprolol 50mg for HTN. Omeprazole for GERD.  - EKG: normal sinus rhythm, inferior ischemic changes consistent with prior EKG 3/24 and known history of CAD s/p PCI.    This is a HIGH risk patient being evaluated for a LOW to INTERMEDIATE risk procedure.     These risk estimates should be incorporated into a shared decision making conversation between the patient or their surrogate and the performing practitioner regarding the risk, benefits, and alternatives to the procedure and whether to proceed. Additional cardiac testing is unlikely to change the risk assessment or procedural outcomes from a cardiac perspective.    Of note, patient must remain on dual antiplatelet therapy (i.e. aspirin and clopidogrel) due to recent PCI for ACS. Discontinuing dual antiplatelet therapy in the setting of recent stent placement carries high risk of stent thrombosis and myocardial infarction. Recommend shared decision making with patient/family, surgeon and anesthesiologist regarding optimal procedural planning.    Matthieu Kennedy MD  Cardiology Fellow - PGY 4  Text or Call: [phone]  For all New Consults and Questions:  [email or web address]   Login: Nan[place]o 66 y/o male Pmh htn, CAD sent in by podiatrist Dr. Corley for surgery tomorrow. Pt was dx w/ a L calcaneal fracture last week and is scheduled for surgery tomorrow at [facility]. Pt admits to mild pain to L foot. Denies chest pain, sob, abd pain, n/v/d, numbness, tingling, weakness, dizziness, syncope, fever or chills.    Assessment:  1) Acute Left foot #  2) HTN  3) HLD  4) CAD s/p PCI 1 week ago with NSTEMI with persistent elevated troponin likely resolving ACS  5) GERD  6) Chronic smoker    Plan or care:  Admit to telemetry  Serial CK, troponin, EKG reviewed  Echocardiogram and cardiac cath results reviewed from 1 week ago  Cardiology evaluation reviewed  Optimal BP Control  ASAEC, Plavix, statin, ACEI, betablocker, Calcium blocker  DVT/GI prophylaxis  Optimal pain control  For possible OR today   NPO past midnight  Patient is at high risk for cardio-pulmonary complication for schedule low to intermediate risk foot/podiatry surgery. You can proceed with surgery pending cardiac recs.   Risk, befits and alternative discussed with patient and son at bedside.  Will follow Yes - the patient is able to be screened

## 2022-10-24 NOTE — PHYSICAL THERAPY INITIAL EVALUATION ADULT - ASSISTIVE DEVICE FOR TRANSFER: STAND/SIT, REHAB EVAL
Prior to immunization administration, verified patients identity using patient s name and date of birth. Please see Immunization Activity for additional information.     Screening Questionnaire for Adult Immunization    Are you sick today?   No   Do you have allergies to medications, food, a vaccine component or latex?   No   Have you ever had a serious reaction after receiving a vaccination?   No   Do you have a long-term health problem with heart, lung, kidney, or metabolic disease (e.g., diabetes), asthma, a blood disorder, no spleen, complement component deficiency, a cochlear implant, or a spinal fluid leak?  Are you on long-term aspirin therapy?   No   Do you have cancer, leukemia, HIV/AIDS, or any other immune system problem?   No   Do you have a parent, brother, or sister with an immune system problem?   No   In the past 3 months, have you taken medications that affect  your immune system, such as prednisone, other steroids, or anticancer drugs; drugs for the treatment of rheumatoid arthritis, Crohn s disease, or psoriasis; or have you had radiation treatments?   No   Have you had a seizure, or a brain or other nervous system problem?   No   During the past year, have you received a transfusion of blood or blood    products, or been given immune (gamma) globulin or antiviral drug?   No   For women: Are you pregnant or is there a chance you could become       pregnant during the next month?   No   Have you received any vaccinations in the past 4 weeks?   No     Immunization questionnaire answers were all negative.        Per orders of Dr. Le, injection of Pfizer Bivalent and Flublok given by Shayy Schafer MA. Patient instructed to remain in clinic for 15 minutes afterwards, and to report any adverse reaction to me immediately.       Screening performed by Shayy Schafer MA on 10/24/2022 at 1:40 PM.   no AD

## 2023-04-21 NOTE — DIETITIAN INITIAL EVALUATION ADULT. - PERTINENT MEDS FT
negative
heparin, meropenem, lantus, ademelog, amiodarone infusion, cardizem, metoprolol, protonix, simvastatin

## 2024-02-07 NOTE — ED ADULT TRIAGE NOTE - DOMESTIC TRAVEL HIGH RISK QUESTION
No
[FreeTextEntry1] : continue medications Losartan Metoprolol Alprazolam  Start antibiotic Start Prednisone chronic medical conditions HTN Return in 3 months

## 2025-03-19 NOTE — ED ADULT NURSE NOTE - PRIMARY CARE PROVIDER
03/19/25                            Suze T Nicole  [address] [address]    To Whom It May Concern:    This is to certify Suze Rivera was evaluated with FLOYD Costello on 03/19/25 and can return to regular work on 3/20/25.                 Electronically signed by:  FLOYD Costello  [address] [address]  Dept Phone: [phone]       
pmd

## 2025-05-19 NOTE — CHART NOTE - NSCHARTNOTESELECT_GEN_ALL_CORE
Event Note
PRE-OP DIAGNOSIS:  Osteoarthritis of right hip 19-May-2025 13:44:05  Trisha Matthews